# Patient Record
Sex: MALE | Race: WHITE | NOT HISPANIC OR LATINO | Employment: OTHER | URBAN - METROPOLITAN AREA
[De-identification: names, ages, dates, MRNs, and addresses within clinical notes are randomized per-mention and may not be internally consistent; named-entity substitution may affect disease eponyms.]

---

## 2017-10-06 ENCOUNTER — TRANSCRIBE ORDERS (OUTPATIENT)
Dept: ADMINISTRATIVE | Facility: HOSPITAL | Age: 72
End: 2017-10-06

## 2017-10-06 ENCOUNTER — HOSPITAL ENCOUNTER (OUTPATIENT)
Dept: RADIOLOGY | Facility: HOSPITAL | Age: 72
Discharge: HOME | End: 2017-10-06
Attending: FAMILY MEDICINE
Payer: MEDICARE

## 2017-10-06 ENCOUNTER — ALLSCRIPTS OFFICE VISIT (OUTPATIENT)
Dept: OTHER | Facility: OTHER | Age: 72
End: 2017-10-06

## 2017-10-06 DIAGNOSIS — M25.551 PAIN IN RIGHT HIP: ICD-10-CM

## 2017-10-06 DIAGNOSIS — M79.672 PAIN OF LEFT FOOT: ICD-10-CM

## 2017-10-06 DIAGNOSIS — M72.2 PLANTAR FASCIAL FIBROMATOSIS: ICD-10-CM

## 2017-10-06 DIAGNOSIS — M54.50 LOW BACK PAIN: ICD-10-CM

## 2017-10-06 PROCEDURE — 73502 X-RAY EXAM HIP UNI 2-3 VIEWS: CPT

## 2017-10-06 PROCEDURE — 73630 X-RAY EXAM OF FOOT: CPT

## 2017-10-07 NOTE — PROGRESS NOTES
Assessment  1  Left foot pain (729 5) (M79 672)   2  Plantar fasciitis, left (728 71) (M72 2)   3  Right hip pain (719 45) (M25 551)   4  Chronic low back pain (724 2,338 29) (M54 5,G89 29)    Plan  Chronic low back pain, Left foot pain, Plantar fasciitis, left, Right hip pain    · *1 - [facility]  *  Status: Active  Requested for: 17PMO2242  Care Summary provided  : Yes   · 2 - Barbi Kee  (Podiatry) Co-Management  *  Status: Hold For - Scheduling   Requested for: 38DWJ8305  Care Summary provided  : Yes  Left foot pain    · * XR FOOT 3+ VIEW LEFT; Status:Active; Requested for:06Oct2017;   Right hip pain    · * XR HIP/PELV 2-3 VWS RIGHT W PELVIS IF PERFORMED; Status:Active; Requested  for:06Oct2017;     I think that there are multiple aspects of the patient's foot pain-1st of all I think he has plantar fasciitis and I provided him with home stretches for this  Additionally, he does have swelling and significant pain on the top of the foot so I am going to get an x-ray to further evaluate what might be causing his pain  I did provide him with the information and referral to Podiatry to follow up on all of this  As far as his right hip pain, it sounds as if he likely has osteoarthritis though he has not really had any imaging  This diagnosis is all from the orthopedic surgeon who lives next door to him  As he is going for an x-ray today I advised he can also get the x-ray of his hip so this can further be evaluated  If there is severe osteoarthritis that he really should follow up with Ortho to consider treatment  As far as his chronic back pain, he seen multiple different chiropractors and feels this is not working anymore and that is not what he needs  I suggested that he try some physical therapy and provided him with a prescription for this  [facility] past and I advised that this is something that they can do in physical therapy    At some point might be reasonable to consider imaging if his symptoms persist and cannot be contributed to his hip pain  Discussion/Summary  Medication SE Review and Pt Understands Tx: Possible side effects of new medications were reviewed with the patient/guardian today  The treatment plan was reviewed with the patient/guardian  The patient/guardian understands and agrees with the treatment plan      Chief Complaint  Chief Complaint Free Text Note Form: 71YEAR OLD MALE PATIENT INITIAL VISIT TO OUR OFFICE  HE WAS A PREVIOUS PATIENT OF DR Berto Cummings [facility]  History of Present Illness  HPI: [facility] and saw Dr Ben Parks by annually with his primary at the [place] in [facility] chronic hip and back 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[employer] and has always told me needs a hip replacementhas not had any recent imaging of that hipdoes walk with a cane and has significant pain in the right hip he is very afraid of surgery  any case, today he is here because he has pain in the left footis not sure it is related at all to his chronic hip or back painwas out raking leaves last weekend and felt that afterwards but had no known traumahurts across the bottom of the footlike he had a bad bruise like he is stepping on something on the bottom of the foothis foot is swollenall of the time but also hurts a lot in the morning when he first steps down  took naproxen 500mg yesterday morning did seem to help a bitbetter yesterday afternoon and eveningworse again this morning  f/cfeeling wellcp/sob           Review of Systems  Complete-Male:   Constitutional: as noted in HPI    ENT: mild hearing loss bilat  Cardiovascular: as noted in HPI  Respiratory: as noted in HPI  Gastrointestinal: no abdominal pain  Musculoskeletal: as noted in HPI  Integumentary: no rashes     Neurological: difficulty walking-and-due to his hip, back and now foot pain  Psychiatric: no depression  Endocrine: no muscle weakness  Hematologic/Lymphatic: no tendency for easy bleeding  Active Problems  1  Flu vaccine need (V04 81) (Z23)    Past Medical History  Active Problems And Past Medical History Reviewed: The active problems and past medical history were reviewed and updated today  Surgical History  1  Denied: History Of Prior Surgery  Surgical History Reviewed: The surgical history was reviewed and updated today  Family History  Family History    1  Unknown family medical history  Family History Reviewed: The family history was reviewed and updated today  Social History   · Smoking (305 1) (F17 200)  Social History Reviewed: The social history was reviewed and updated today  Current Meds   1  [employer] Vitamin C 1000 MG Oral Tablet; Therapy: 23VZL0236 to Recorded   2  Multi Complete Oral Capsule; Therapy: 76UNS4359 to Recorded   3  Naproxen 500 MG Oral Tablet; Therapy: 41NHE8905 to Recorded   4  Zostavax 24974 UNT/0 65ML Subcutaneous Solution Reconstituted; Therapy: 54Mjd5092 to (Evaluate:83Aha1221) Recorded    Allergies  1  Sulfa Drugs  2  No Known Environmental Allergies   3  No Known Food Allergies    Vitals  Vital Signs    Recorded: 18OEB4198 08:49AM   Temperature 97 6 F   Heart Rate 67   Systolic 850   Diastolic 78   Height 5 ft 8 in   Weight 159 lb 8 oz   BMI Calculated 24 25   BSA Calculated 1 86   O2 Saturation 98     Physical Exam    Constitutional   General appearance: Abnormal   appears healthy,-uncomfortable,-within normal limits of ideal weight-and-well hydrated  Eyes   Conjunctiva and lids: No swelling, erythema, or discharge  Ears, Nose, Mouth, and Throat   External inspection of ears and nose: Normal     Pulmonary   Respiratory effort: No increased work of breathing or signs of respiratory distress      Cardiovascular   Examination of extremities for edema and/or varicosities: Abnormal  -(nonpitting edema of the top of the left foot but not into the ankle or leg)   Musculoskeletal   Gait and station: Abnormal   Gait evaluation demonstrated antalgia bilaterally-and-walks with a cane  Inspection/palpation of joints, bones, and muscles: Abnormal  -(ttp over the plantar fascia on the left foot; also exquisite ttp over the top of the foot where he has edema, just over the cuneiform bone)   Skin   Skin and subcutaneous tissue: Normal without rashes or lesions  -there is no erythema, ecchymosis, or rash over the area on the foot where he has pain or edema  Neurologic   Sensation: No sensory loss  Psychiatric   Orientation to person, place and time: Normal     Mood and affect: Normal          Results/Data  PHQ-2 Adult Depression Screening 55WTE6088 09:25AM User, Ahs     Test Name Result Flag Reference   PHQ-2 Adult Depression Score 0     Over the last two weeks, how often have you been bothered by any of the following problems?   Little interest or pleasure in doing things: Not at all - 0  Feeling down, depressed, or hopeless: Not at all - 0   PHQ-2 Adult Depression Screening Negative         Signatures   Electronically signed by : EWA Martínez ; Oct  6 2017 12:26PM EST                       (Author)

## 2017-10-10 ENCOUNTER — GENERIC CONVERSION - ENCOUNTER (OUTPATIENT)
Dept: OTHER | Facility: OTHER | Age: 72
End: 2017-10-10

## 2018-01-10 NOTE — RESULT NOTES
Discussion/Summary   The foot xray was normal   The hip x-ray showed severe arthritis and probably developmental dysplasia which would have been something he had as a child  He really does need surgery for this  He told me this has been told to him before  Please ask if he needs the number for an ortho  Verified Results  * XR HIP/PELV 2-3 VWS RIGHT W PELVIS IF PERFORMED 45VIF0481 10:54AM Gin Olive Order Number: [id number]     Test Name Result Flag Reference   * XR HIP/PELV 2-3 VWS RIGHT (Report)     RIGHT HIP     INDICATION: 78-year-old male, right hip pain     COMPARISON: None     VIEWS: AP pelvis and 2 coned down views of the hip     IMAGES: 3     FINDINGS:   Severe degenerative changes involve the right hip  There is deformity of the right femoral head suspicious for developmental dysplasia  There is no acute fracture or dislocation  Multilevel degenerative lumbar spondylosis     No lytic or blastic lesions are seen  Soft tissues are unremarkable  IMPRESSION:   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

## 2018-01-13 VITALS
TEMPERATURE: 97.6 F | DIASTOLIC BLOOD PRESSURE: 78 MMHG | HEART RATE: 67 BPM | HEIGHT: 68 IN | SYSTOLIC BLOOD PRESSURE: 144 MMHG | OXYGEN SATURATION: 98 % | WEIGHT: 159.5 LBS | BODY MASS INDEX: 24.17 KG/M2

## 2018-01-23 ENCOUNTER — ALLSCRIPTS OFFICE VISIT (OUTPATIENT)
Dept: OTHER | Facility: OTHER | Age: 73
End: 2018-01-23

## 2018-01-23 DIAGNOSIS — Z13.1 ENCOUNTER FOR SCREENING FOR DIABETES MELLITUS: ICD-10-CM

## 2018-01-23 DIAGNOSIS — R17 JAUNDICE: ICD-10-CM

## 2018-01-23 DIAGNOSIS — Z12.5 ENCOUNTER FOR SCREENING FOR MALIGNANT NEOPLASM OF PROSTATE: ICD-10-CM

## 2018-01-23 DIAGNOSIS — Z13.29 ENCOUNTER FOR SCREENING FOR OTHER SUSPECTED ENDOCRINE DISORDER: ICD-10-CM

## 2018-01-23 DIAGNOSIS — Z11.59 ENCOUNTER FOR SCREENING FOR OTHER VIRAL DISEASES (CODE): ICD-10-CM

## 2018-01-23 DIAGNOSIS — R53.83 OTHER FATIGUE: ICD-10-CM

## 2018-01-23 DIAGNOSIS — Z13.220 ENCOUNTER FOR SCREENING FOR LIPOID DISORDERS: ICD-10-CM

## 2018-01-24 ENCOUNTER — TELEPHONE (OUTPATIENT)
Dept: FAMILY MEDICINE CLINIC | Facility: CLINIC | Age: 73
End: 2018-01-24

## 2018-01-24 DIAGNOSIS — J44.1 CHRONIC OBSTRUCTIVE PULMONARY DISEASE WITH ACUTE EXACERBATION (HCC): Primary | ICD-10-CM

## 2018-01-24 RX ORDER — NAPROXEN 500 MG/1
1 TABLET ORAL 2 TIMES DAILY PRN
COMMUNITY
Start: 2017-10-06 | End: 2018-02-14 | Stop reason: HOSPADM

## 2018-01-24 RX ORDER — ALBUTEROL SULFATE 90 UG/1
2 AEROSOL, METERED RESPIRATORY (INHALATION) EVERY 4 HOURS PRN
Qty: 1 INHALER | Refills: 0 | Status: SHIPPED | OUTPATIENT
Start: 2018-01-24

## 2018-01-24 NOTE — PROGRESS NOTES
Assessment   1  Jaundice (782 4) (R17)   2  Chronic obstructive pulmonary disease (496) (J44 9)   3  Generalized abdominal pain (789 07) (R10 84)   4  GERD (gastroesophageal reflux disease) (530 81) (K21 9)   5  Recently stopped smoking   6  Poor dentition (525 9) (K08 9)   7  Degenerative joint disease (DJD) of hip (715 95) (M16 9)   8  Screening for diabetes mellitus (DM) (V77 1) (Z13 1)   9  Encounter for screening for lipid disorder (V77 91) (Z13 220)   10  Screening for thyroid disorder (V77 0) (Z13 29)   11  Need for hepatitis C screening test (V73 89) (Z11 59)   12  Fatigue (780 79) (R53 83)   13  Special screening for other neurological conditions (V80 09) (Z13 89)   14  Screening for other and unspecified genitourinary condition (V81 6) (Z13 89)    Plan    Chronic obstructive pulmonary disease    · Symbicort 80-4 5 MCG/ACT Inhalation Aerosol; INHALE 2 PUFFS Twice daily  Encounter for screening for lipid disorder    · (1) LIPID PANEL FASTING W DIRECT LDL REFLEX; Status:Active; Requested    NQX:49KES4970;   Encounter for special screening examination for neoplasm of prostate, Screening for    diabetes mellitus (DM)    · (1) PSA (SCREEN) (Dx V76 44 Screen for Prostate Cancer); Status:Active; Requested    RVD:99QRW6637; Fatigue    · (1) TESTOSTERONE; Status:Active; Requested QGQ:28OEB1916;   Jaundice    · (1) CBC/PLT/DIFF; Status:Active; Requested KIO:64RNQ5598;   Need for hepatitis C screening test, Screening for thyroid disorder    · (1) HEP C ANTIBODY; Status:Active; Requested QKZ:88RFI2241;   Screening for diabetes mellitus (DM)    · (1) COMPREHENSIVE METABOLIC PANEL; Status:Active; Requested UWO:17TOY3785;   Screening for other and unspecified genitourinary condition, Special screening for other    neurological conditions    · *VB - Urinary Incontinence Screen (Dx Z13 89 Screen for UI);  Status:Complete;   Done:    14XTD6243 02:23PM   · *VB - Urinary Incontinence Screen (Dx Z13 89 Screen for UI) ; every 1 year; Last    23Jan2018; Next 28PDS8647; Status:Active  Screening for thyroid disorder    · (1) TSH; Status:Active; Requested QOA:78QLO3389;   Special screening for other neurological conditions    · *VB - Fall Risk Assessment  (Dx Z13 89 Screen for Neurologic Disorder);    Status:Complete;   Done: 78FZB6909 02:22PM   · *VB - Fall Risk Assessment  (Dx Z13 89 Screen for Neurologic Disorder) ; every 1 year; Last 03CBG5235; Next 82NAB5808; Status:Active      * CT ABDOMEN PELVIS W CONTRAST; Status:Hold For - Scheduling; Requested NVJ:79SNM7663; Perform:[facility]; SDQ:18TTJ1141;FDBNXUR; For:Jaundice; Ordered By:Juliana Benítez;        Discussion/Summary      1) [address] 10/10/2017 FASTING BLOOD WORK , INCLUDING TESTOSTERONE AND HEPATITIS C SCREEN- PT WILL GET DONE ASAP CT SCAN ABDOMEN AND PELVIS WITH CONTRAST AFTER BLOOD WORK  OMEPRAZAOLE 20MG 1 TAB DAILY 30 MIN BEFORE EATING  WILL NEED FOLLOW UP AS SOON AS TESTING IS DONE IF SYMPTOMS WORSENING, ADVISED TO GO TO ED          Possible side effects of new medications were reviewed with the patient/guardian today  The treatment plan was reviewed with the patient/guardian  The patient/guardian understands and agrees with the treatment plan      Chief Complaint   PATIENT IS HERE FOR SOB, AND WOULD LIKE TO HAVE RENEWAL PROVENTAL, AND ASMANEX WHICH HE RECEIVES FROM [facility]  HE IS ALSO HAVING LOSS OF APPETITE, AND BOWEL ISSUES  HE IS ALSO JAUNDICE  History of Present Illness   HPI: PT SEEN IN OUR OFFICE TO RE-ESTABLISH  [facility] BUT HAS NOT BEEN IN OVER 1 YEAR  [facility] BEEN [facility] ASMANEX AND PROVENTIL INHALER- RAN OUT OF MEDICATION  NOT HAPPY WITH MUSCLE TONE  NOW JUST USES RESCUE INHALER- WOULD LIKE TO RESTART MAINTENANCE INHALER  PT QUIT SMOKING A FEW MONTHS AGO   2 BEER AND 2 SHOTS AND HAD LOWER ABDOMINAL PAIN  INCREASED BMS  20 POUNDS OVER THE PAST 2 MONTHS   HAS BEEN EATING YOGURT, TRIED OMEPRAZOLE OVER THE PAST FEW DAYS  FOR HEARTBURN  RECENTLY NOTICED THAT HE WAS JAUNDICED WHEN THEY WERE OUT SHOVELING SNOW  NOT SURE HOW LONG AGO  HAS A LAUNDRY LIST OF COMPLAINTS  Review of Systems        Constitutional: feeling tired, but-- as noted in HPI,-- no fever-- and-- no chills  ENT: no complaints of earache, no loss of hearing, no nosebleeds or nasal discharge, no sore throat or hoarseness  Cardiovascular: no complaints of slow or fast heart rate, no chest pain, no palpitations, no leg claudication or lower extremity edema  Respiratory: shortness of breath, but-- as noted in HPI,-- no cough,-- no orthopnea,-- no shortness of breath during exertion-- and-- no PND  Gastrointestinal: abdominal pain, but-- as noted in HPI,-- no nausea,-- no vomiting,-- no constipation,-- no diarrhea-- and-- no blood in stools  Genitourinary: no complaints of dysuria or incontinence, no hesitancy, no nocturia, no genital lesion, no inadequacy of penile erection  Musculoskeletal: no complaints of arthralgia, no myalgia, no joint swelling or stiffness, no limb pain or swelling  Integumentary: itching-- and-- JAUNDICE, but-- as noted in HPI  Neurological: no complaints of headache, no confusion, no numbness or tingling, no dizziness or fainting  Preventive Quality 65 and Older: The patient is currently experiencing urinary symptoms  Urinary Incontinence Symptoms includes: nocturia and weak stream, but no urinary incontinence, no incomplete bladder emptying, no urinary frequency, no urinary urgency, no dysuria, no straining, no intermittent stream and no post-void dribbling       Active Problems   1  Chronic low back pain (724 2,338 29) (M54 5,G89 29)   2  Flu vaccine need (V04 81) (Z23)   3  Left foot pain (729 5) (M79 672)   4  Plantar fasciitis, left (728 71) (M72 2)   5  Right hip pain (719 45) (M25 551)    Family History   Family History    1   Unknown family medical history  Family History Reviewed: The family history was reviewed and updated today  Social History    · Daily caffeine consumption, 2-3 servings a day   · No illicit drug use   · Occasional alcohol consumption (V49 89) (Z78 9)   · Recently stopped smoking   · Retired  The social history was reviewed and updated today  The social history was reviewed and is unchanged  Surgical History   1  Denied: History Of Prior Surgery  Surgical History Reviewed: The surgical history was reviewed and updated today  Current Meds    1  CVS Vitamin C 1000 MG Oral Tablet; Therapy: 39POM8101 to Recorded   2  Multi Complete Oral Capsule; Therapy: 04YZD9833 to Recorded   3  Naproxen 500 MG Oral Tablet; Therapy: 49IWO3398 to Recorded   4  Zostavax 45328 UNT/0 65ML Subcutaneous Solution Reconstituted; Therapy: 54Qjd1107 to (Evaluate:97Xhu5930) Recorded     The medication list was reviewed and updated today  Allergies   1  Sulfa Drugs  2  No Known Environmental Allergies   3  No Known Food Allergies    Vitals    Recorded: 77EKZ1207 10:41AM   Temperature 98 1 F   Heart Rate 79   Systolic 167   Diastolic 82   Height 5 ft 8 in   Weight 137 lb 8 oz   BMI Calculated 20 91   BSA Calculated 1 74   O2 Saturation 98     Physical Exam        Constitutional      General appearance: Abnormal  -- JAUNDICE  Eyes      Conjunctiva and lids: Abnormal  -- ICTERUS  Pulmonary      Respiratory effort: No increased work of breathing or signs of respiratory distress  Auscultation of lungs: Abnormal  -- DECREASED BREATH SOUNDS  Cardiovascular      Auscultation of heart: Normal rate and rhythm, normal S1 and S2, without murmurs  Abdomen      Abdomen: Abnormal  -- FIRM ABDOMEN WITH DIFFUSE TENDERNESS  Lymphatic      Palpation of lymph nodes in neck: No lymphadenopathy         Musculoskeletal      Gait and station: Normal        Digits and nails: Normal without clubbing or cyanosis         Inspection/palpation of joints, bones, and muscles: Normal        Psychiatric      Orientation to person, place and time: Normal        Mood and affect: Normal           Results/Data   *VB - Urinary Incontinence Screen (Dx Z13 89 Screen for UI) 70WGS2810 02:23PM Nahomy Crown      Test Name Result Flag Reference   Urinary Incontinence Assessment 08TIJ1315        *VB - Fall Risk Assessment  (Dx Z13 89 Screen for Neurologic Disorder) 60YVN8753 02:22PM Nahomy Crown      Test Name Result Flag Reference   Falls Risk      No falls in the past year      Falls Risk Assessment (Dx Z13 89 Screen for Neurologic Disorder) 16AMB1588 02:15PM UserDayan      Test Name Result Flag Reference   Falls Risk      No falls in the past year        Signatures    Electronically signed by : Shoaib Calle DO; Jan 23 2018  5:58PM EST                       (Author)

## 2018-01-26 LAB
ALBUMIN SERPL-MCNC: 3.3 G/DL (ref 3.5–4.8)
ALBUMIN/GLOB SERPL: 0.8 {RATIO} (ref 1.2–2.2)
ALP SERPL-CCNC: 570 IU/L (ref 39–117)
ALT SERPL-CCNC: 175 IU/L (ref 0–44)
AMBIG ABBREV DEFAULT: NORMAL
AST SERPL-CCNC: 108 IU/L (ref 0–40)
BASOPHILS # BLD AUTO: 0.1 X10E3/UL (ref 0–0.2)
BASOPHILS NFR BLD AUTO: 1 %
BILIRUB SERPL-MCNC: 14.3 MG/DL (ref 0–1.2)
BUN SERPL-MCNC: 22 MG/DL (ref 8–27)
BUN/CREAT SERPL: 16 (ref 10–24)
CALCIUM SERPL-MCNC: 9.5 MG/DL (ref 8.6–10.2)
CHLORIDE SERPL-SCNC: 91 MMOL/L (ref 96–106)
CHOLEST SERPL-MCNC: 257 MG/DL (ref 100–199)
CO2 SERPL-SCNC: 21 MMOL/L (ref 18–29)
CREAT SERPL-MCNC: 1.4 MG/DL (ref 0.76–1.27)
CYTOLOGY CMNT CVX/VAG CYTO-IMP: ABNORMAL
EOSINOPHIL # BLD AUTO: 0 X10E3/UL (ref 0–0.4)
EOSINOPHIL NFR BLD AUTO: 0 %
ERYTHROCYTE [DISTWIDTH] IN BLOOD BY AUTOMATED COUNT: 15.5 % (ref 12.3–15.4)
GLOBULIN SER-MCNC: 4.2 G/DL (ref 1.5–4.5)
GLUCOSE SERPL-MCNC: 131 MG/DL (ref 65–99)
HCT VFR BLD AUTO: 35 % (ref 37.5–51)
HCV AB S/CO SERPL IA: <0.1 S/CO RATIO (ref 0–0.9)
HDLC SERPL-MCNC: 11 MG/DL
HGB BLD-MCNC: 11.6 G/DL (ref 13–17.7)
IMM GRANULOCYTES # BLD: 0 X10E3/UL (ref 0–0.1)
IMM GRANULOCYTES NFR BLD: 0 %
LDLC SERPL CALC-MCNC: 192 MG/DL (ref 0–99)
LDLC/HDLC SERPL: 17.5 RATIO UNITS (ref 0–3.6)
LYMPHOCYTES # BLD AUTO: 1.5 X10E3/UL (ref 0.7–3.1)
LYMPHOCYTES NFR BLD AUTO: 8 %
MCH RBC QN AUTO: 30.4 PG (ref 26.6–33)
MCHC RBC AUTO-ENTMCNC: 33.1 G/DL (ref 31.5–35.7)
MCV RBC AUTO: 92 FL (ref 79–97)
MONOCYTES # BLD AUTO: 1.2 X10E3/UL (ref 0.1–0.9)
MONOCYTES NFR BLD AUTO: 7 %
NEUTROPHILS # BLD AUTO: 14.8 X10E3/UL (ref 1.4–7)
NEUTROPHILS NFR BLD AUTO: 84 %
PLATELET # BLD AUTO: 363 X10E3/UL (ref 150–379)
POTASSIUM SERPL-SCNC: 4 MMOL/L (ref 3.5–5.2)
PROT SERPL-MCNC: 7.5 G/DL (ref 6–8.5)
PSA SERPL-MCNC: 0.9 NG/ML (ref 0–4)
RBC # BLD AUTO: 3.82 X10E6/UL (ref 4.14–5.8)
SL AMB EGFR AFRICAN AMERICAN: 58 ML/MIN/1.73
SL AMB EGFR NON AFRICAN AMERICAN: 50 ML/MIN/1.73
SL AMB VLDL CHOLESTEROL CALC: 54 MG/DL (ref 5–40)
SODIUM SERPL-SCNC: 132 MMOL/L (ref 134–144)
TESTOST SERPL-MCNC: 148 NG/DL (ref 264–916)
TRIGL SERPL-MCNC: 272 MG/DL (ref 0–149)
TSH SERPL DL<=0.005 MIU/L-ACNC: 2.64 UIU/ML (ref 0.45–4.5)
WBC # BLD AUTO: 17.7 X10E3/UL (ref 3.4–10.8)

## 2018-01-27 ENCOUNTER — HOSPITAL ENCOUNTER (OUTPATIENT)
Dept: CT IMAGING | Facility: HOSPITAL | Age: 73
Discharge: HOME | End: 2018-01-27
Payer: COMMERCIAL

## 2018-01-27 DIAGNOSIS — R17 JAUNDICE: ICD-10-CM

## 2018-01-27 PROCEDURE — 74177 CT ABD & PELVIS W/CONTRAST: CPT

## 2018-01-27 RX ADMIN — IOHEXOL 100 ML: 350 INJECTION, SOLUTION INTRAVENOUS at 11:15

## 2018-01-30 PROBLEM — M54.50 CHRONIC LOW BACK PAIN: Status: ACTIVE | Noted: 2017-10-06

## 2018-01-30 PROBLEM — R10.84 GENERALIZED ABDOMINAL PAIN: Status: ACTIVE | Noted: 2018-01-23

## 2018-01-30 PROBLEM — M16.9 DEGENERATIVE JOINT DISEASE (DJD) OF HIP: Status: ACTIVE | Noted: 2018-01-23

## 2018-01-30 PROBLEM — K08.9 POOR DENTITION: Status: ACTIVE | Noted: 2018-01-23

## 2018-01-30 PROBLEM — J44.9 CHRONIC OBSTRUCTIVE PULMONARY DISEASE (HCC): Status: ACTIVE | Noted: 2018-01-23

## 2018-01-30 PROBLEM — M25.551 RIGHT HIP PAIN: Status: ACTIVE | Noted: 2017-10-06

## 2018-01-30 PROBLEM — R17 JAUNDICE: Status: ACTIVE | Noted: 2018-01-23

## 2018-01-30 PROBLEM — G89.29 CHRONIC LOW BACK PAIN: Status: ACTIVE | Noted: 2017-10-06

## 2018-01-30 PROBLEM — R53.83 FATIGUE: Status: ACTIVE | Noted: 2018-01-23

## 2018-01-30 PROBLEM — M79.672 LEFT FOOT PAIN: Status: ACTIVE | Noted: 2017-10-06

## 2018-01-30 PROBLEM — M72.2 PLANTAR FASCIITIS, LEFT: Status: ACTIVE | Noted: 2017-10-06

## 2018-01-30 PROBLEM — K21.9 GERD (GASTROESOPHAGEAL REFLUX DISEASE): Status: ACTIVE | Noted: 2018-01-23

## 2018-01-31 ENCOUNTER — TELEPHONE (OUTPATIENT)
Dept: GASTROENTEROLOGY | Facility: CLINIC | Age: 73
End: 2018-01-31

## 2018-01-31 ENCOUNTER — HOSPITAL ENCOUNTER (INPATIENT)
Facility: HOSPITAL | Age: 73
LOS: 14 days | DRG: 435 | End: 2018-02-14
Attending: EMERGENCY MEDICINE | Admitting: ANESTHESIOLOGY
Payer: COMMERCIAL

## 2018-01-31 DIAGNOSIS — N13.30 HYDRONEPHROSIS, UNSPECIFIED HYDRONEPHROSIS TYPE: ICD-10-CM

## 2018-01-31 DIAGNOSIS — C79.9 METASTATIC ADENOCARCINOMA (HCC): ICD-10-CM

## 2018-01-31 DIAGNOSIS — R79.89 ABNORMAL LFTS: ICD-10-CM

## 2018-01-31 DIAGNOSIS — R63.4 WEIGHT LOSS, NON-INTENTIONAL: ICD-10-CM

## 2018-01-31 DIAGNOSIS — R16.0 LIVER MASS: ICD-10-CM

## 2018-01-31 DIAGNOSIS — E87.2 LACTIC ACIDOSIS: ICD-10-CM

## 2018-01-31 DIAGNOSIS — R10.84 GENERALIZED ABDOMINAL PAIN: ICD-10-CM

## 2018-01-31 DIAGNOSIS — R93.5 ABNORMAL CT OF THE ABDOMEN: ICD-10-CM

## 2018-01-31 DIAGNOSIS — R17 JAUNDICE: Primary | ICD-10-CM

## 2018-01-31 PROBLEM — J44.9 CHRONIC OBSTRUCTIVE PULMONARY DISEASE (HCC): Chronic | Status: ACTIVE | Noted: 2018-01-23

## 2018-01-31 LAB
ALBUMIN SERPL BCP-MCNC: 2.1 G/DL (ref 3.5–5)
ALP SERPL-CCNC: 467 U/L (ref 46–116)
ALT SERPL W P-5'-P-CCNC: 110 U/L (ref 12–78)
ANION GAP SERPL CALCULATED.3IONS-SCNC: 9 MMOL/L (ref 4–13)
AST SERPL W P-5'-P-CCNC: 80 U/L (ref 5–45)
BACTERIA UR QL AUTO: ABNORMAL /HPF
BILIRUB DIRECT SERPL-MCNC: 12.57 MG/DL (ref 0–0.2)
BILIRUB SERPL-MCNC: 14.95 MG/DL (ref 0.2–1)
BILIRUB UR QL STRIP: ABNORMAL
BUN SERPL-MCNC: 20 MG/DL (ref 5–25)
CALCIUM SERPL-MCNC: 8.9 MG/DL (ref 8.3–10.1)
CHLORIDE SERPL-SCNC: 100 MMOL/L (ref 100–108)
CLARITY UR: ABNORMAL
CO2 SERPL-SCNC: 25 MMOL/L (ref 21–32)
COLOR UR: ABNORMAL
CREAT SERPL-MCNC: 1.34 MG/DL (ref 0.6–1.3)
GFR SERPL CREATININE-BSD FRML MDRD: 53 ML/MIN/1.73SQ M
GLUCOSE SERPL-MCNC: 110 MG/DL (ref 65–140)
GLUCOSE UR STRIP-MCNC: NEGATIVE MG/DL
HGB UR QL STRIP.AUTO: NEGATIVE
HOLD SPECIMEN: NORMAL
HYALINE CASTS #/AREA URNS LPF: ABNORMAL /LPF
KETONES UR STRIP-MCNC: ABNORMAL MG/DL
LEUKOCYTE ESTERASE UR QL STRIP: ABNORMAL
NITRITE UR QL STRIP: POSITIVE
NON-SQ EPI CELLS URNS QL MICRO: ABNORMAL /HPF
PH UR STRIP.AUTO: 5.5 [PH] (ref 4.5–8)
POTASSIUM SERPL-SCNC: 3.7 MMOL/L (ref 3.5–5.3)
PROT SERPL-MCNC: 7.5 G/DL (ref 6.4–8.2)
PROT UR STRIP-MCNC: ABNORMAL MG/DL
RBC #/AREA URNS AUTO: ABNORMAL /HPF
SODIUM SERPL-SCNC: 134 MMOL/L (ref 136–145)
SP GR UR STRIP.AUTO: 1.03 (ref 1–1.03)
UROBILINOGEN UR QL STRIP.AUTO: 1 E.U./DL
WBC #/AREA URNS AUTO: ABNORMAL /HPF

## 2018-01-31 PROCEDURE — 81001 URINALYSIS AUTO W/SCOPE: CPT | Performed by: EMERGENCY MEDICINE

## 2018-01-31 PROCEDURE — 85025 COMPLETE CBC W/AUTO DIFF WBC: CPT | Performed by: EMERGENCY MEDICINE

## 2018-01-31 PROCEDURE — 82248 BILIRUBIN DIRECT: CPT | Performed by: EMERGENCY MEDICINE

## 2018-01-31 PROCEDURE — 82378 CARCINOEMBRYONIC ANTIGEN: CPT | Performed by: HOSPITALIST

## 2018-01-31 PROCEDURE — 94760 N-INVAS EAR/PLS OXIMETRY 1: CPT

## 2018-01-31 PROCEDURE — 80053 COMPREHEN METABOLIC PANEL: CPT | Performed by: EMERGENCY MEDICINE

## 2018-01-31 PROCEDURE — 36415 COLL VENOUS BLD VENIPUNCTURE: CPT

## 2018-01-31 PROCEDURE — 99285 EMERGENCY DEPT VISIT HI MDM: CPT

## 2018-01-31 PROCEDURE — 86301 IMMUNOASSAY TUMOR CA 19-9: CPT | Performed by: HOSPITALIST

## 2018-01-31 PROCEDURE — 99223 1ST HOSP IP/OBS HIGH 75: CPT | Performed by: HOSPITALIST

## 2018-01-31 RX ORDER — OXYCODONE HCL 5 MG/5 ML
5 SOLUTION, ORAL ORAL EVERY 4 HOURS PRN
Status: DISCONTINUED | OUTPATIENT
Start: 2018-01-31 | End: 2018-02-10

## 2018-01-31 RX ORDER — POLYETHYLENE GLYCOL 3350 17 G/17G
17 POWDER, FOR SOLUTION ORAL DAILY
Status: DISCONTINUED | OUTPATIENT
Start: 2018-02-01 | End: 2018-02-12

## 2018-01-31 RX ORDER — SENNOSIDES 8.6 MG
1 TABLET ORAL DAILY
Status: DISCONTINUED | OUTPATIENT
Start: 2018-02-01 | End: 2018-02-12

## 2018-01-31 RX ORDER — ALBUTEROL SULFATE 90 UG/1
2 AEROSOL, METERED RESPIRATORY (INHALATION) EVERY 4 HOURS PRN
Status: DISCONTINUED | OUTPATIENT
Start: 2018-01-31 | End: 2018-02-12

## 2018-01-31 RX ORDER — DOCUSATE SODIUM 100 MG/1
100 CAPSULE, LIQUID FILLED ORAL 2 TIMES DAILY
Status: DISCONTINUED | OUTPATIENT
Start: 2018-02-01 | End: 2018-02-12

## 2018-01-31 RX ORDER — ONDANSETRON 2 MG/ML
4 INJECTION INTRAMUSCULAR; INTRAVENOUS EVERY 6 HOURS PRN
Status: DISCONTINUED | OUTPATIENT
Start: 2018-01-31 | End: 2018-02-12

## 2018-01-31 RX ORDER — ACETAMINOPHEN 325 MG/1
650 TABLET ORAL EVERY 6 HOURS PRN
Status: DISCONTINUED | OUTPATIENT
Start: 2018-01-31 | End: 2018-02-10

## 2018-01-31 RX ORDER — MORPHINE SULFATE 4 MG/ML
4 INJECTION, SOLUTION INTRAMUSCULAR; INTRAVENOUS ONCE
Status: DISCONTINUED | OUTPATIENT
Start: 2018-01-31 | End: 2018-02-11

## 2018-01-31 RX ORDER — SODIUM CHLORIDE 9 MG/ML
90 INJECTION, SOLUTION INTRAVENOUS CONTINUOUS
Status: DISPENSED | OUTPATIENT
Start: 2018-01-31 | End: 2018-02-01

## 2018-01-31 RX ADMIN — SODIUM CHLORIDE 90 ML/HR: 0.9 INJECTION, SOLUTION INTRAVENOUS at 23:48

## 2018-02-01 PROBLEM — N39.0 UTI (URINARY TRACT INFECTION): Status: ACTIVE | Noted: 2018-02-01

## 2018-02-01 LAB
ALBUMIN SERPL BCP-MCNC: 1.9 G/DL (ref 3.5–5)
ALP SERPL-CCNC: 434 U/L (ref 46–116)
ALT SERPL W P-5'-P-CCNC: 100 U/L (ref 12–78)
ANION GAP SERPL CALCULATED.3IONS-SCNC: 9 MMOL/L (ref 4–13)
AST SERPL W P-5'-P-CCNC: 74 U/L (ref 5–45)
BASOPHILS # BLD AUTO: 0.09 THOUSANDS/ΜL (ref 0–0.1)
BASOPHILS NFR BLD AUTO: 1 % (ref 0–1)
BILIRUB SERPL-MCNC: 13.95 MG/DL (ref 0.2–1)
BUN SERPL-MCNC: 23 MG/DL (ref 5–25)
CALCIUM SERPL-MCNC: 8.6 MG/DL (ref 8.3–10.1)
CEA SERPL-MCNC: 4.1 NG/ML (ref 0–3)
CHLORIDE SERPL-SCNC: 103 MMOL/L (ref 100–108)
CO2 SERPL-SCNC: 24 MMOL/L (ref 21–32)
CREAT SERPL-MCNC: 1.32 MG/DL (ref 0.6–1.3)
EOSINOPHIL # BLD AUTO: 0.04 THOUSAND/ΜL (ref 0–0.61)
EOSINOPHIL NFR BLD AUTO: 0 % (ref 0–6)
ERYTHROCYTE [DISTWIDTH] IN BLOOD BY AUTOMATED COUNT: 18.5 % (ref 11.6–15.1)
ERYTHROCYTE [DISTWIDTH] IN BLOOD BY AUTOMATED COUNT: 18.6 % (ref 11.6–15.1)
GFR SERPL CREATININE-BSD FRML MDRD: 54 ML/MIN/1.73SQ M
GLUCOSE SERPL-MCNC: 104 MG/DL (ref 65–140)
HCT VFR BLD AUTO: 29 % (ref 36.5–49.3)
HCT VFR BLD AUTO: 29.1 % (ref 36.5–49.3)
HGB BLD-MCNC: 10.2 G/DL (ref 12–17)
HGB BLD-MCNC: 10.5 G/DL (ref 12–17)
LYMPHOCYTES # BLD AUTO: 1.92 THOUSANDS/ΜL (ref 0.6–4.47)
LYMPHOCYTES NFR BLD AUTO: 11 % (ref 14–44)
MCH RBC QN AUTO: 30 PG (ref 26.8–34.3)
MCH RBC QN AUTO: 30.7 PG (ref 26.8–34.3)
MCHC RBC AUTO-ENTMCNC: 35.1 G/DL (ref 31.4–37.4)
MCHC RBC AUTO-ENTMCNC: 36.2 G/DL (ref 31.4–37.4)
MCV RBC AUTO: 85 FL (ref 82–98)
MCV RBC AUTO: 86 FL (ref 82–98)
MONOCYTES # BLD AUTO: 1.44 THOUSAND/ΜL (ref 0.17–1.22)
MONOCYTES NFR BLD AUTO: 8 % (ref 4–12)
NEUTROPHILS # BLD AUTO: 13.47 THOUSANDS/ΜL (ref 1.85–7.62)
NEUTS SEG NFR BLD AUTO: 80 % (ref 43–75)
NRBC BLD AUTO-RTO: 0 /100 WBCS
PLATELET # BLD AUTO: 287 THOUSANDS/UL (ref 149–390)
PLATELET # BLD AUTO: 318 THOUSANDS/UL (ref 149–390)
PMV BLD AUTO: 10.5 FL (ref 8.9–12.7)
PMV BLD AUTO: 11 FL (ref 8.9–12.7)
POTASSIUM SERPL-SCNC: 3.8 MMOL/L (ref 3.5–5.3)
PROT SERPL-MCNC: 6.9 G/DL (ref 6.4–8.2)
PSA SERPL-MCNC: 0.6 NG/ML (ref 0–4)
RBC # BLD AUTO: 3.4 MILLION/UL (ref 3.88–5.62)
RBC # BLD AUTO: 3.42 MILLION/UL (ref 3.88–5.62)
SODIUM SERPL-SCNC: 136 MMOL/L (ref 136–145)
WBC # BLD AUTO: 13.63 THOUSAND/UL (ref 4.31–10.16)
WBC # BLD AUTO: 17.11 THOUSAND/UL (ref 4.31–10.16)

## 2018-02-01 PROCEDURE — 99223 1ST HOSP IP/OBS HIGH 75: CPT | Performed by: INTERNAL MEDICINE

## 2018-02-01 PROCEDURE — 99222 1ST HOSP IP/OBS MODERATE 55: CPT | Performed by: NURSE PRACTITIONER

## 2018-02-01 PROCEDURE — 99222 1ST HOSP IP/OBS MODERATE 55: CPT | Performed by: INTERNAL MEDICINE

## 2018-02-01 PROCEDURE — 80053 COMPREHEN METABOLIC PANEL: CPT | Performed by: HOSPITALIST

## 2018-02-01 PROCEDURE — 85027 COMPLETE CBC AUTOMATED: CPT | Performed by: HOSPITALIST

## 2018-02-01 PROCEDURE — G0103 PSA SCREENING: HCPCS | Performed by: INTERNAL MEDICINE

## 2018-02-01 PROCEDURE — 99233 SBSQ HOSP IP/OBS HIGH 50: CPT | Performed by: HOSPITALIST

## 2018-02-01 RX ADMIN — ACETAMINOPHEN 650 MG: 325 TABLET, FILM COATED ORAL at 07:34

## 2018-02-01 RX ADMIN — ENOXAPARIN SODIUM 40 MG: 40 INJECTION SUBCUTANEOUS at 08:46

## 2018-02-01 RX ADMIN — DOCUSATE SODIUM 100 MG: 100 CAPSULE, LIQUID FILLED ORAL at 18:12

## 2018-02-01 RX ADMIN — DOCUSATE SODIUM 100 MG: 100 CAPSULE, LIQUID FILLED ORAL at 08:46

## 2018-02-01 NOTE — H&P
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 involving upper abdominal organs, omentum and peritoneal margin, long segment of sigmoid colon in left hemipelvis  High-grade intra hepatic biliary dilatation  Oncology Dr Ravinder Hernández recommended patient to come to the emergency room to expedite in-hospital workup  Blood work reveal TB 14 9 direct bilirubin 12 6  CEA, Ca199 ordered  IV fluids antiemetic pain management  Oncology and GI consult  requested                 Jaundice   Assessment & Plan    Secondary to #1  Abnormal LFTs   Assessment & Plan    Secondary to #1  Chronic obstructive pulmonary disease (HCC)   Assessment & Plan    Continue albuterol   Respiratory protocol        Weight loss, non-intentional   Assessment & Plan    Secondary to #1( decreased oral intake with evidence of malignancy unknown primary origin)  Nutritionist consult placed        Fatigue   Assessment & Plan    Secondary to # 1  VTE Prophylaxis: Enoxaparin (Lovenox)  / sequential compression device   Code Status: full  POLST: There is no POLST form on file for this patient (pre-hospital)      Anticipated Length of Stay:  Patient will be admitted on an Inpatient basis with an anticipated length of stay of  > 2 midnights  Justification for Hospital Stay:  Oncology and GI consult    Total Time for Visit, including Counseling / Coordination of Care: 45 minutes  Greater than 50% of this total time spent on direct patient counseling and coordination of care      Chief Complaint:   Painless jaundice    History of Present Illness:    Elisa Perkins is a 71 y o  male with long history of smoking who presented to the emergency room for evaluation of abnormal CT scan finding   Patient reports approximately 30 lb unintentional weight loss over the last couple of months , change in  bowel habits, fatigue, lower abdominal pain nonradiating  Denies fever chills nausea vomiting chest pain, melena hematemesis hematuria  Recently his son noted yellow skin tone  Patient went to PCP approximately a week ago and had an  outpatient CT scan which reported 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 involving upper abdominal organs omentum and peritoneal margin,  long segment of sigmoid colon in left hemipelvis intra hepatic biliary dilatation to the level of the otto hepatis left-sided hydronephrosis  Total bilirubin 14 9 direct 12 6  AST ALT and alk phos elevated  Patient reports last colonoscopy more than 10 years ago was normal  Patient admitted on an  inpatient basis for further workup    Review of Systems:    Review of Systems   Constitutional: Positive for activity change, appetite change, fatigue and unexpected weight change  Skin: Positive for color change  Past Medical and Surgical History:     Past Medical History:   Diagnosis Date    COPD (chronic obstructive pulmonary disease) ([address] )     DJD (degenerative joint disease)        History reviewed  No pertinent surgical history  Meds/Allergies:    Prior to Admission medications    Medication Sig Start Date End Date Taking?  Authorizing Provider   albuterol (VENTOLIN HFA) 90 mcg/act inhaler Inhale 2 puffs every 4 (four) hours as needed for wheezing 1/24/18  Yes Errol Smart, DO   Multiple Vitamins-Minerals (MULTI COMPLETE PO) Take 1 tablet by mouth daily 10/6/17  Yes Historical Provider, MD   naproxen (NAPROSYN) 500 mg tablet Take 1 tablet by mouth 2 (two) times a day as needed 10/6/17  Yes Historical Provider, MD     I have reviewed home medications with a medical source (PCP, Pharmacy, other)  Allergies: Allergies   Allergen Reactions    Sulfa Antibiotics        Social History:     Marital Status: Single   Occupation: none  Patient Pre-hospital Living Situation: home  Patient Pre-hospital Level of Mobility: reg  Patient Pre-hospital Diet Restrictions: reg  Substance Use History:   History   Alcohol Use No     History   Smoking Status    Former Smoker   Smokeless Tobacco    Never Used     History   Drug Use No       Family History:    non-contributory    Physical Exam:     Vitals:   Blood Pressure: 158/85 (01/31/18 2234)  Pulse: 75 (01/31/18 2234)  Temperature: 98 2 °F (36 8 °C) (01/31/18 2234)  Temp Source: Axillary (01/31/18 2234)  Respirations: 18 (01/31/18 2234)  Height: 5' 8" (172 7 cm) (01/31/18 2234)  Weight - Scale: 59 1 kg (130 lb 6 4 oz) (01/31/18 2234)  SpO2: 100 % (01/31/18 2234)    Physical Exam   Constitutional: No distress  HENT:   Head: Normocephalic  Eyes: Pupils are equal, round, and reactive to light  Scleral icterus is present  Neck: Normal range of motion  Cardiovascular: Regular rhythm  Pulmonary/Chest: No respiratory distress  Abdominal: There is tenderness  Musculoskeletal: He exhibits no edema  Neurological: He is alert  Skin:   Jaundice           Additional Data:     Lab Results: I have personally reviewed pertinent reports  Results from last 7 days  Lab Units 01/31/18 2020   SODIUM mmol/L 134*   POTASSIUM mmol/L 3 7   CHLORIDE mmol/L 100   CO2 mmol/L 25   BUN mg/dL 20   CREATININE mg/dL 1 34*   CALCIUM mg/dL 8 9   TOTAL PROTEIN g/dL 7 5   BILIRUBIN TOTAL mg/dL 14 95*   ALK PHOS U/L 467*   ALT U/L 110*   AST U/L 80*   GLUCOSE RANDOM mg/dL 110           Imaging: I have personally reviewed pertinent reports  No orders to display         [facility] Records Reviewed: Yes     ** Please Note: This note has been constructed using a voice recognition system   **

## 2018-02-01 NOTE — ASSESSMENT & PLAN NOTE
Secondary to above( decreased oral intake with evidence of malignancy unknown primary origin)  Nutritionist consult placed

## 2018-02-01 NOTE — ASSESSMENT & PLAN NOTE
Patient presented with painless jaundice significant weight loss outpatient CT of abdomen reported 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 involving upper abdominal organs, omentum and peritoneal margin, long segment of sigmoid colon in left hemipelvis  High-grade intra hepatic biliary dilatation    Oncology Dr Eric Bergeron recommended patient to come to the emergency room to expedite in-hospital workup  Blood work reveal TB 14 9 direct bilirubin 12 6  CEA, Ca199 ordered  IV fluids antiemetic pain management  Oncology and GI consult  requested

## 2018-02-01 NOTE — CASE MANAGEMENT
Initial Clinical Review    Admission: Date/Time/Statement: 1/31/18 @ 2140     Orders Placed This Encounter   Procedures    Inpatient Admission (expected length of stay for this patient is greater than two midnights)     Standing Status:   Standing     Number of Occurrences:   1     Order Specific Question:   Admitting Physician     Answer:   Anthony Murphy     Order Specific Question:   Level of Care     Answer:   Med Surg [16]     Order Specific Question:   Estimated length of stay     Answer:   More than 2 Midnights     Order Specific Question:   Certification     Answer:   I certify that inpatient services are medically necessary for this patient for a duration of greater than two midnights  See H&P and MD Progress Notes for additional information about the patient's course of treatment  ED: Date/Time/Mode of Arrival:   ED Arrival Information     Expected Arrival Acuity Means of Arrival Escorted By Service Admission Type    - 1/31/2018 18:20 Urgent Walk-In Self General Medicine Urgent    Arrival Complaint    Jaundice, High Bilirubin Ct          Chief Complaint:   Chief Complaint   Patient presents with    Jaundice     pt with jaundice starting a few days ago have never had this problem before, pt with weight loss, had CT scan Saturday has high bilirubin        History of Illness: 71 y o  male with long history of smoking who presented to the emergency room for evaluation of abnormal CT scan finding  Patient reports approximately 30 lb unintentional weight loss over the last couple of months, change in  bowel habits, fatigue, lower abdominal pain nonradiating  Denies fever chills nausea vomiting chest pain, melena hematemesis hematuria  Recently his son noted yellow skin tone    Patient went to PCP approximately a week ago and had an  outpatient CT scan which reported 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 involving upper abdominal organs omentum and peritoneal margin,  long segment of sigmoid colon in left hemipelvis intra hepatic biliary dilatation to the level of the otto hepatis left-sided hydronephrosis  Total bilirubin 14 9 direct 12 6  AST ALT and alk phos elevated  Patient reports last colonoscopy more than 10 years ago was normal  Patient admitted on an  inpatient basis for further workup    ED Vital Signs:   ED Triage Vitals [01/31/18 1827]   Temperature Pulse Respirations Blood Pressure SpO2   97 9 °F (36 6 °C) 84 18 137/83 99 %      Temp Source Heart Rate Source Patient Position - Orthostatic VS BP Location FiO2 (%)   Tympanic Monitor Sitting Left arm --      Pain Score       No Pain        Wt Readings from Last 1 Encounters:   02/01/18 56 8 kg (125 lb 3 5 oz)       Vital Signs (abnormal): WNL    Abnormal Labs:    01/31/18 2020    Sodium 136 - 145 mmol/L 134     Potassium 3 5 - 5 3 mmol/L 3 7    Chloride 100 - 108 mmol/L 100    CO2 21 - 32 mmol/L 25    Anion Gap 4 - 13 mmol/L 9    BUN 5 - 25 mg/dL 20    Creatinine 0 60 - 1 30 mg/dL 1 34     Glucose 65 - 140 mg/dL 110    Calcium 8 3 - 10 1 mg/dL 8 9    AST 5 - 45 U/L 80     ALT 12 - 78 U/L 110     Alkaline Phosphatase 46 - 116 U/L 467     Total Protein 6 4 - 8 2 g/dL 7 5    Albumin 3 5 - 5 0 g/dL 2 1     Total Bilirubin 0 20 - 1 00 mg/dL 14 95        01/31/18 2020    WBC 4 31 - 10 16 Thousand/uL 17 11     RBC 3 88 - 5 62 Million/uL 3 42     Hemoglobin 12 0 - 17 0 g/dL 10 5     Hematocrit 36 5 - 49 3 % 29 0        01/31/18 2020    CEA 0 0 - 3 0 ng/mL 4 1         Diagnostic Test Results: CT Abd/ Pelvis - Extensive infiltrating tumor mass of unknown primary involving upper abdominal organs, omentum and peritoneal margin, left para-aortic nodes, as well as a long segment of sigmoid colon in left hemipelvis  As a result of infiltrating tumor mass there is high-grade intrahepatic biliary dilatation to the level of the otto hepatis as well as left-sided hydroureteronephrosis level of the left hemipelvis      ED Treatment: Medication Administration from 01/31/2018 1819 to 01/31/2018 3025       Date/Time Order Dose Route Action Action by Comments   None       Past Medical/Surgical History: Active Ambulatory Problems     Diagnosis Date Noted    Chronic low back pain 10/06/2017    Chronic obstructive pulmonary disease (HCC) 01/23/2018    Degenerative joint disease (DJD) of hip 01/23/2018    Fatigue 01/23/2018    Generalized abdominal pain 01/23/2018    GERD (gastroesophageal reflux disease) 01/23/2018    Jaundice 01/23/2018    Left foot pain 10/06/2017    Plantar fasciitis, left 10/06/2017    Poor dentition 01/23/2018    Right hip pain 10/06/2017     Resolved Ambulatory Problems     Diagnosis Date Noted    No Resolved Ambulatory Problems     Past Medical History:   Diagnosis Date    COPD (chronic obstructive pulmonary disease) ([facility] )     DJD (degenerative joint disease)        Admitting Diagnosis: Jaundice [R17]  Abnormal CT of the abdomen [R93 5]    Age/Sex: 71 y o  male    Assessment/Plan:   Abnormal CT of the abdomen   Assessment & Plan     Patient presented with painless jaundice significant weight loss outpatient CT of abdomen reported 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 involving upper abdominal organs, omentum and peritoneal margin, long segment of sigmoid colon in left hemipelvis  High-grade intra hepatic biliary dilatation    Oncology Dr Elena Tyler recommended patient to come to the emergency room to expedite in-hospital workup  Blood work reveal TB 14 9 direct bilirubin 12 6  CEA, Ca199 ordered  IV fluids antiemetic pain management  Oncology and GI consult  requested              Jaundice   Assessment & Plan     Secondary to #1           Abnormal LFTs   Assessment & Plan     Secondary to #1           Chronic [facility] pulmonary disease ([facility] )   Assessment & Plan     Continue albuterol   Respiratory protocol          Weight loss, non-intentional   Assessment & Plan     Secondary to #1( decreased oral intake with evidence of malignancy unknown primary origin)  Nutritionist consult placed          Fatigue   Assessment & Plan     Secondary to # 1              VTE Prophylaxis: Enoxaparin (Lovenox)  / sequential compression device   Code Status: full  POLST: There is no POLST form on file for this patient (pre-hospital)     Anticipated Length of Stay:  Patient will be admitted on an Inpatient basis with an anticipated length of stay of  > 2 midnights  Justification for Hospital Stay:  Oncology and GI consult       Admission Orders:  Clear liquid  Gastroenterology cons  Oncology cons  Urology cons    Scheduled Meds:   Current Facility-Administered Medications:  acetaminophen 650 mg Oral Q6H PRN   albuterol 2 puff Inhalation Q4H PRN   docusate sodium 100 mg Oral BID   enoxaparin 40 mg Subcutaneous Daily   HYDROmorphone 0 5 mg Intravenous Q3H PRN   morphine injection 4 mg Intravenous Once   ondansetron 4 mg Intravenous Q6H PRN   oxyCODONE 5 mg Oral Q4H PRN   polyethylene glycol 17 g Oral Daily   senna 1 tablet Oral Daily   sodium chloride 90 mL/hr Intravenous Continuous     Continuous Infusions:   sodium chloride 90 mL/hr Last Rate: 90 mL/hr (01/31/18 2456)     PRN Meds:     Acetaminophen po x1    albuterol    HYDROmorphone    ondansetron    oxyCODONE    --------------------------------------------------------------------------------------------------    2/1 Oncology cons:  Assessment and plan:  1    Extensive masses in the upper abdomen area with extension into the otto hepaticus area and dilatation of the intra hepatic bile duct, with painless obstructive jaundice in a patient who had been losing weight about 30 lb in the past 2-3 months, CT scan showed extensive infiltrating tumor involving the upper abdominal organs, omentum, possible peritoneal carcinomatosis, left para-aortic nodes, left hydronephrosis, possible sigmoid colon involvement, CEA 4 2  2   GI consult on board for possible ERCP or PTCA to alleviate obstructive jaundice  3  Urology consult for left hydronephrosis, he has enlarged prostate, PSA is ordered  4   CA 19-9 is pending  5    Await for pathology he might need an IR core biopsy for definitive diagnosis and will proceed from there

## 2018-02-01 NOTE — CONSULTS
CONSULT    Patient Name: Linette Vines  Patient MRN: [id number]  Date of Service: 2/1/2018   Date / Time Note Created: 2/1/2018 3:05 PM   Referring Provider: Dr Singh Creating Note: ABI Coker    PCP: Amirah Mcgarry  Attending Provider:  Irvin Meneses MD    Reason for Consult: Hydronephrosis      HPI--Mr Lula Nuñez is a 23-year-old male admitted with jaundice and elevated LFTs  Patient denies any prior  history including nephrolithiasis, frequent UTIs or prior  surgical manipulation  CT scan was obtained to investigate patient's  complaints as well as recent weight loss positive for extensive infiltrating tumor of  unknown primary site with involvement of abdominal organs including pancreas, omentum, peritoneal margin left periaortic nodes; in addition to segment of sigmoid colon  Patient denies any fever, chills, flank, testicular or groin pain or any irritative or obstructive urinary symptoms including dysuria, hematuria, or sensation of incomplete bladder emptying  Urologic consultation is requested concerning additional findings of left hydronephrosis  Creatinine remains intact and patient is voiding spontaneously into bedside urinal   Source:the patient       Active Problems:    Patient Active Problem List   Diagnosis    Chronic low back pain    Chronic obstructive pulmonary disease (HCC)    Degenerative joint disease (DJD) of hip    Fatigue    Generalized abdominal pain    GERD (gastroesophageal reflux disease)    Jaundice    Left foot pain    Plantar fasciitis, left    Poor dentition    Right hip pain    Abnormal CT of the abdomen    Abnormal LFTs    Weight loss, non-intentional    UTI (urinary tract infection)       Impressions  Hydronephrosis 2/2 extrinsic compression of large abdominal tumor with multiple organ involvement    Recommendations  Patient denies any flank pain and creatinine remains intact    Would monitor creatinine trend  If patient should developed acute kidney injury, would reimage for worsening or progressive hydronephrosis  If patient is to consider cyto-reductive chemotherapy or other treatment in the near future and creatinine is not optimized, ureteral stent placement or other renal decompression may be necessary to support patient's treatment goals  Otherwise, reduction tumor burden  and its associated mass effect will likely result in resolution of hydronephrosis        Past Medical History:   Diagnosis Date    COPD (chronic obstructive pulmonary disease) (HCC)     DJD (degenerative joint disease)        History reviewed  No pertinent surgical history  History reviewed  No pertinent family history  Social History     Social History    Marital status: Single     Spouse name: N/A    Number of children: N/A    Years of education: N/A     Occupational History    Not on file       Social History Main Topics    Smoking status: Former Smoker    Smokeless tobacco: Never Used    Alcohol use No    Drug use: No    Sexual activity: Not on file     Other Topics Concern    Not on file     Social History Narrative    No narrative on file       Allergies   Allergen Reactions    Sulfa Antibiotics Rash       Review of Systems  10 point review of systems negative except as noted in HPI  Constitutional:   negative for - fatigue or weight loss  Hematological and Lymphatic:   negative  Respiratory:   no cough, shortness of breath, or wheezing  Cardiovascular:   no chest pain or dyspnea on exertion  Gastrointestinal:   no abdominal pain, change in bowel habits, or black or bloody stools  Genito-Urinary:   no dysuria, trouble voiding, or hematuria  Neurological:   no TIA or stroke symptoms     Chart Review   Allergies, current medications, history, problem list    Vital Signs  /77 (BP Location: Left arm)   Pulse 67   Temp 98 5 °F (36 9 °C) (Oral)   Resp 18   Ht 5' 8" (1 727 m)   Wt 56 8 kg (125 lb 3 5 oz)   SpO2 97%   BMI 19 04 kg/m² Physical Exam  General appearance: alert and oriented, in no acute distress and cooperative  Head: Normocephalic, without obvious abnormality, atraumatic  Neck: no adenopathy, no carotid bruit, no JVD, supple, symmetrical, trachea midline and thyroid not enlarged, symmetric, no tenderness/mass/nodules  Lungs: clear to auscultation bilaterally  Heart: regular rate and rhythm, S1, S2 normal, no murmur, click, rub or gallop  Abdomen: soft, non-tender; bowel sounds normal; no masses,  no organomegaly  Extremities: extremities normal, warm and well-perfused; no cyanosis, clubbing, or edema  Pulses: 2+ and symmetric  Neurologic: Grossly normal  No urinary drains     Laboratory Studies  Lab Results   Component Value Date     02/01/2018    K 3 8 02/01/2018     02/01/2018    CO2 24 02/01/2018    GLUCOSE 104 02/01/2018    CREATININE 1 32 (H) 02/01/2018    BUN 23 02/01/2018     Lab Results   Component Value Date    WBC 13 63 (H) 02/01/2018    RBC 3 40 (L) 02/01/2018    HGB 10 2 (L) 02/01/2018    HCT 29 1 (L) 02/01/2018    MCV 86 02/01/2018    MCH 30 0 02/01/2018    RDW 18 6 (H) 02/01/2018     02/01/2018       Imaging and Other Studies  )  Ct Abdomen Pelvis W Contrast    Result Date: 1/30/2018  Narrative: CT ABDOMEN AND PELVIS WITH IV CONTRAST INDICATION:  Jaundice  Constipation  COMPARISON: None  TECHNIQUE:  CT examination of the abdomen and pelvis was performed  Scanning through the abdomen was performed in arterial, venous and delayed phases according a protocol spefically designed to evaluate upper abdominal viscera  Reformatted images were created in axial, sagittal, and coronal planes  Radiation dose length product (DLP) for this visit:  724 38 mGy-cm   This examination, like all CT scans performed in the [facility], was performed utilizing techniques to minimize radiation dose exposure, including the use of iterative  reconstruction and automated exposure control   IV Contrast: 100 mL of iohexol (OMNIPAQUE)  Because of borderline renal function, standard department hydration protocol was recommended to minimize risk of contrast induced nephropathy  Enteric Contrast:  Enteric contrast was administered  FINDINGS: There is sigmoid wall thickening with pericolonic inflammatory edema  The appearance is suspicious for sigmoid tumor mass especially given extensive tumor mass in the upper abdomen described in greater detail below  There is left-sided hydroureteronephrosis to the site of left sigmoid infiltrating tumor  There is infiltrating soft tissue tumor mass with spiculated margins and desmoplastic response involving multiple organs in the upper abdomen  Given involvement of multiple upper abdominal organs, the primary origin of the tumor mass cannot be elucidated with certainty on the basis of this examination  Spiculated tumor mass involves the pancreatic tail (tumor in the pancreatic tail measures 3 6 x 2 3 cm on image 49 of series 3), the greater curvature of the stomach (a portion of the spiculated tumor mass inseparable from the cervical wall thickening curvature of the stomach measures 4 1 x 4 1 cm on image 44 series 3), extends along the greater curvature of the stomach with encasement of the otto hepatis, fills the otto hepatis where there is occlusion of the main portal vein (well demonstrated on coronal image 63 of series 603) with infiltrative tumor mass extending along the undersurface of the right hepatic lobe (tumor mass along the undersurface of right hepatic lobe measures 6 1 x 3 3 cm on image 55 of series 3)  Tumor mass extends laterally along the subcapsular border of the liver, along the peritoneal margin, and into the right upper quadrant omentum  In the left upper abdomen there is amorphous left para-aortic tumor mass inseparable from the left adrenal gland and the left diaphragmatic maggie measuring approximately 2 8 x 2 3 cm on image 53 of series 3  Infiltrative tumor mass in the otto hepatis results in high-grade intrahepatic biliary obstruction as well as encasement of the hepatic artery  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  Hepatic veins appear patent  ABDOMEN LOWER CHEST:  No significant abnormalities identified in the lower chest  LIVER/BILIARY TREE:  See above description of infiltrating tumor mass throughout the otto hepatis, along the undersurface of the right hepatic lobe, and along the subcapsular margin of the right hepatic lobe  High-grade biliary dilatation  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  Encasement of hepatic artery  GALLBLADDER:  High density within the lumen of the gallbladder probably representing inspissated sludge  No gallbladder wall thickening  There is probable encasement of the cystic duct by infiltrative tumor mass in the right upper abdomen  SPLEEN:  Intrinsically unremarkable  Trace perisplenic fluid noted  PANCREAS:  Pancreatic tail tumor mass with atrophy of the more distal pancreatic tail  ADRENAL GLANDS:  Tumor inseparable from left adrenal gland  Unremarkable right adrenal gland  KIDNEYS/URETERS:  There is delayed left renal nephrogram with left-sided hydroureteronephrosis to the level of the left hemipelvis where tumor mass is present  See description of tumor below  STOMACH AND BOWEL:  Wall thickening involving the sigmoid colon in the left hemipelvis suspicious for tumor mass  It is unclear whether this represents primary colonic adenocarcinoma or perhaps serosal wall infiltrating tumor in this person with extensive intraperitoneal infiltrating tumor masses in the upper abdomen  No bowel obstruction  Sigmoid diverticulosis without acute diverticulitis  APPENDIX:  No findings to suggest appendicitis  ABDOMINOPELVIC CAVITY:  See above description of extensive infiltrating tumor mass  VESSELS:  Extensive atherosclerotic disease    Multiple penetrating atherosclerotic ulcer is noted  PELVIS REPRODUCTIVE ORGANS:  Prostatomegaly with slight invagination into the urinary bladder base  URINARY BLADDER:  Unremarkable  ABDOMINAL WALL/INGUINAL REGIONS:  Unremarkable  OSSEOUS STRUCTURES:  Advanced right hip osteoarthritis is noted  Lumbar degenerative changes are seen  No destructive osseous lesion is evident  Impression: Extensive infiltrating tumor mass of unknown primary involving upper abdominal organs, omentum and peritoneal margin, left para-aortic nodes, as well as a long segment of sigmoid colon in left hemipelvis  As a result of infiltrating tumor mass there is high-grade intrahepatic biliary dilatation to the level of the otto hepatis as well as left-sided hydroureteronephrosis level of the left hemipelvis  I personally discussed this study with David Loaiza on 1/30/2018 10:20 AM  Workstation performed: [id number]       Medications   Scheduled Meds:  Current Facility-Administered Medications:  acetaminophen 650 mg Oral Q6H PRN Danielle Quinn MD   albuterol 2 puff Inhalation Q4H PRN Danielle Quinn MD   docusate sodium 100 mg Oral BID Danielle Quinn MD   enoxaparin 40 mg Subcutaneous Daily Danielle Quinn MD   HYDROmorphone 0 5 mg Intravenous Q3H PRN Danielle Quinn MD   morphine injection 4 mg Intravenous Once Oral Wang DO   ondansetron 4 mg Intravenous Q6H PRN Danielle Quinn MD   oxyCODONE 5 mg Oral Q4H PRN Danielle Quinn MD   polyethylene glycol 17 g Oral Daily Danielle Quinn MD   senna 1 tablet Oral Daily Danielle Quinn MD     Continuous Infusions:   PRN Meds:   acetaminophen    albuterol    HYDROmorphone    ondansetron    oxyCODONE      Total time spent with patient 20 minutes, >50% spent counseling and/or coordination of care           ABI Grubbs

## 2018-02-01 NOTE — CONSULTS
Oncology Consult Note  Janie Redman 71 y o  male MRN: [id number]  Unit/Bed#: -01 Encounter: [id number]      Presenting Complaint:  Weight loss, abdominal distention, jaundice    History of Presenting Illness:  79-year-old  male who noticed around Thanksgiving of 2017 loss of appetite, weight loss he lost about 30 lb over the past 2-3 months, his wife noticed abdominal distention, most recently he had darkening of the urine, and jaundice, he was seen by Dr Omkar Lewis  The patient was admitted to the hospital, a CT scan of the abdomen and pelvis in January 2017 showed infiltrating soft tissue tumor mass with spiculated margins involving multiple organs in the upper abdomen the primary could not be elucidated the mass involving the pancreatic tail measuring 3 6 x 2 3 cm, the greater curvature of the stomach and extends along the greater curvature of the stomach with encasement of the otto hepaticus involving the otto hepaticus with occlusion of the main portal vein, infiltration along the undersurface of the right hepatic lobe, subcapsular of the liver right upper quadrant omentum left para-aortic tumor mass could not be distinguished from the left adrenal gland measuring 2 8 x 2 3 cm, left hydronephrosis, high-grade intra hepatic biliary obstruction and encasement of the hepatic artery, prostate enlargement  CEA 4 2, creatinine 1 34, AST 80, , alkaline phosphatase 467, total bilirubin 14 9, hemoglobin 10 5, MCV 85, RDW 18 5, platelets 338, WBC 65 8, 80% neutrophils  He reported change in bowel habits between constipation and normal bowel habits however he said that he felt like a mass in the anal area  CA 19 9 is pending  The patient smokes intermittently, he drinks alcohol occasionally    Review of Systems - As stated in the HPI otherwise the fourteen point review of systems was negative      Past Medical History:   Diagnosis Date    COPD (chronic obstructive pulmonary disease) ([facility] [address] )     KVNG (degenerative joint disease)        Social History     Social History    Marital status: Single     Spouse name: N/A    Number of children: N/A    Years of education: N/A     Social History Main Topics    Smoking status: Former Smoker    Smokeless tobacco: Never Used    Alcohol use No    Drug use: No    Sexual activity: Not Asked     Other Topics Concern    None     Social History Narrative    None       History reviewed  No pertinent family history      Allergies   Allergen Reactions    Sulfa Antibiotics Rash         Current Facility-Administered Medications:     acetaminophen (TYLENOL) tablet 650 mg, 650 mg, Oral, Q6H PRN, Po Gan MD, 650 mg at 02/01/18 0734    albuterol (PROVENTIL HFA,VENTOLIN HFA) inhaler 2 puff, 2 puff, Inhalation, Q4H PRN, Po Gan MD    docusate sodium (COLACE) capsule 100 mg, 100 mg, Oral, BID, Po Gan MD, 100 mg at 02/01/18 0846    enoxaparin (LOVENOX) subcutaneous injection 40 mg, 40 mg, Subcutaneous, Daily, Po Gan MD, 40 mg at 02/01/18 0846    HYDROmorphone (DILAUDID) injection 0 5 mg, 0 5 mg, Intravenous, Q3H PRN, Po Gan MD    morphine (PF) 4 mg/mL injection 4 mg, 4 mg, Intravenous, Once, Taniya Nichols DO    ondansetron [facility]) injection 4 mg, 4 mg, Intravenous, Q6H PRN, Po Gan MD    oxyCODONE (ROXICODONE) oral solution 5 mg, 5 mg, Oral, Q4H PRN, Po Gan MD    polyethylene glycol (MIRALAX) packet 17 g, 17 g, Oral, Daily, Po Gan MD, Stopped at 02/01/18 4448    senna (SENOKOT) tablet 8 6 mg, 1 tablet, Oral, Daily, Po Gan MD, Stopped at 02/01/18 0834    sodium chloride 0 9 % infusion, 90 mL/hr, Intravenous, Continuous, Po Gan MD, Last Rate: 90 mL/hr at 01/31/18 2348, 90 mL/hr at 01/31/18 2348      /77 (BP Location: Left arm)   Pulse 67   Temp 98 5 °F (36 9 °C) (Oral)   Resp 18   Ht 5' 8" (1 727 m)   Wt 56 8 kg (125 lb 3 5 oz)   SpO2 97%   BMI 19 04 kg/m²       General Appearance:    Alert, oriented , jaundice       Eyes:    PERRL, icterus   Ears:    Normal external ear canals, both ears   Nose:   Nares normal, septum midline   Throat:   Mucosa moist  Pharynx without injection  Neck:   Supple       Lungs:     Clear to auscultation bilaterally   Chest Wall:    No tenderness or deformity    Heart:    Regular rate and rhythm       Abdomen:     Distended, tender in the right upper quadrant, left upper quadrant, could not feel a specific mass per se           Extremities:   Extremities no cyanosis or edema       Skin:   no rash or icterus  Lymph nodes:   Cervical, supraclavicular, and axillary nodes normal   Neurologic:   CNII-XII intact, normal strength, sensation and reflexes     Throughout               Recent Results (from the past 48 hour(s))   Green / Yellow tube on hold    Collection Time: 01/31/18  8:20 PM   Result Value Ref Range    Extra Tube Hold for add-ons  Green / Black tube on hold    Collection Time: 01/31/18  8:20 PM   Result Value Ref Range    Extra Tube Hold for add-ons  Lavender Top on hold    Collection Time: 01/31/18  8:20 PM   Result Value Ref Range    Extra Tube Hold for add-ons      Bilirubin, direct    Collection Time: 01/31/18  8:20 PM   Result Value Ref Range    Bilirubin, Direct 12 57 (H) 0 00 - 0 20 mg/dL   CBC and differential    Collection Time: 01/31/18  8:20 PM   Result Value Ref Range    WBC 17 11 (H) 4 31 - 10 16 Thousand/uL    RBC 3 42 (L) 3 88 - 5 62 Million/uL    Hemoglobin 10 5 (L) 12 0 - 17 0 g/dL    Hematocrit 29 0 (L) 36 5 - 49 3 %    MCV 85 82 - 98 fL    MCH 30 7 26 8 - 34 3 pg    MCHC 36 2 31 4 - 37 4 g/dL    RDW 18 5 (H) 11 6 - 15 1 %    MPV 10 5 8 9 - 12 7 fL    Platelets 507 054 - 442 Thousands/uL    nRBC 0 /100 WBCs    Neutrophils Relative 80 (H) 43 - 75 %    Lymphocytes Relative 11 (L) 14 - 44 %    Monocytes Relative 8 4 - 12 %    Eosinophils Relative 0 0 - 6 %    Basophils Relative 1 0 - 1 % Neutrophils Absolute 13 47 (H) 1 85 - 7 62 Thousands/µL    Lymphocytes Absolute 1 92 0 60 - 4 47 Thousands/µL    Monocytes Absolute 1 44 (H) 0 17 - 1 22 Thousand/µL    Eosinophils Absolute 0 04 0 00 - 0 61 Thousand/µL    Basophils Absolute 0 09 0 00 - 0 10 Thousands/µL   Comprehensive metabolic panel    Collection Time: 01/31/18  8:20 PM   Result Value Ref Range    Sodium 134 (L) 136 - 145 mmol/L    Potassium 3 7 3 5 - 5 3 mmol/L    Chloride 100 100 - 108 mmol/L    CO2 25 21 - 32 mmol/L    Anion Gap 9 4 - 13 mmol/L    BUN 20 5 - 25 mg/dL    Creatinine 1 34 (H) 0 60 - 1 30 mg/dL    Glucose 110 65 - 140 mg/dL    Calcium 8 9 8 3 - 10 1 mg/dL    AST 80 (H) 5 - 45 U/L     (H) 12 - 78 U/L    Alkaline Phosphatase 467 (H) 46 - 116 U/L    Total Protein 7 5 6 4 - 8 2 g/dL    Albumin 2 1 (L) 3 5 - 5 0 g/dL    Total Bilirubin 14 95 (H) 0 20 - 1 00 mg/dL    eGFR 53 ml/min/1 73sq m   CEA    Collection Time: 01/31/18  8:20 PM   Result Value Ref Range    CEA 4 1 (H) 0 0 - 3 0 ng/mL   UA  w Reflex to Microscopic    Collection Time: 01/31/18  9:45 PM   Result Value Ref Range    Color, UA Orange     Clarity, UA Cloudy     Specific [place], UA 1 028 1 003 - 1 030    pH, UA 5 5 4 5 - 8 0    Leukocytes, UA Small (A) Negative    Nitrite, UA Positive (A) Negative    Protein, UA Trace (A) Negative mg/dl    Glucose, UA Negative Negative mg/dl    Ketones, UA Trace (A) Negative mg/dl    Urobilinogen, UA 1 0 0 2, 1 0 E U /dl E U /dl    Bilirubin, UA Interference- unable to analyze (A) Negative    Blood, UA Negative Negative   Urine Microscopic    Collection Time: 01/31/18  9:45 PM   Result Value Ref Range    RBC, UA None Seen None Seen, 0-5 /hpf    WBC, UA 2-4 (A) None Seen, 0-5, 5-55, 5-65 /hpf    Epithelial Cells Occasional None Seen, Occasional /hpf    Bacteria, UA None Seen None Seen, Occasional /hpf    Hyaline Casts, UA 0-3 (A) None Seen /lpf   Comprehensive metabolic panel    Collection Time: 02/01/18  5:14 AM   Result Value Ref Range    Sodium 136 136 - 145 mmol/L    Potassium 3 8 3 5 - 5 3 mmol/L    Chloride 103 100 - 108 mmol/L    CO2 24 21 - 32 mmol/L    Anion Gap 9 4 - 13 mmol/L    BUN 23 5 - 25 mg/dL    Creatinine 1 32 (H) 0 60 - 1 30 mg/dL    Glucose 104 65 - 140 mg/dL    Calcium 8 6 8 3 - 10 1 mg/dL    AST 74 (H) 5 - 45 U/L     (H) 12 - 78 U/L    Alkaline Phosphatase 434 (H) 46 - 116 U/L    Total Protein 6 9 6 4 - 8 2 g/dL    Albumin 1 9 (L) 3 5 - 5 0 g/dL    Total Bilirubin 13 95 (H) 0 20 - 1 00 mg/dL    eGFR 54 ml/min/1 73sq m   CBC (With Platelets)    Collection Time: 02/01/18  5:14 AM   Result Value Ref Range    WBC 13 63 (H) 4 31 - 10 16 Thousand/uL    RBC 3 40 (L) 3 88 - 5 62 Million/uL    Hemoglobin 10 2 (L) 12 0 - 17 0 g/dL    Hematocrit 29 1 (L) 36 5 - 49 3 %    MCV 86 82 - 98 fL    MCH 30 0 26 8 - 34 3 pg    MCHC 35 1 31 4 - 37 4 g/dL    RDW 18 6 (H) 11 6 - 15 1 %    Platelets 103 507 - 063 Thousands/uL    MPV 11 0 8 9 - 12 7 fL         Ct Abdomen Pelvis W Contrast    Result Date: 1/30/2018  Narrative: CT ABDOMEN AND PELVIS WITH IV CONTRAST INDICATION:  Jaundice  Constipation  COMPARISON: None  TECHNIQUE:  CT examination of the abdomen and pelvis was performed  Scanning through the abdomen was performed in arterial, venous and delayed phases according a protocol spefically designed to evaluate upper abdominal viscera  Reformatted images were created in axial, sagittal, and coronal planes  Radiation dose length product (DLP) for this visit:  724 38 mGy-cm   This examination, like all CT scans performed in the [facility], was performed utilizing techniques to minimize radiation dose exposure, including the use of iterative  reconstruction and automated exposure control  IV Contrast:  100 mL of iohexol (OMNIPAQUE)  Because of borderline renal function, standard department hydration protocol was recommended to minimize risk of contrast induced nephropathy      Enteric Contrast:  Enteric contrast was administered  FINDINGS: There is sigmoid wall thickening with pericolonic inflammatory edema  The appearance is suspicious for sigmoid tumor mass especially given extensive tumor mass in the upper abdomen described in greater detail below  There is left-sided hydroureteronephrosis to the site of left sigmoid infiltrating tumor  There is infiltrating soft tissue tumor mass with spiculated margins and desmoplastic response involving multiple organs in the upper abdomen  Given involvement of multiple upper abdominal organs, the primary origin of the tumor mass cannot be elucidated with certainty on the basis of this examination  Spiculated tumor mass involves the pancreatic tail (tumor in the pancreatic tail measures 3 6 x 2 3 cm on image 49 of series 3), the greater curvature of the stomach (a portion of the spiculated tumor mass inseparable from the cervical wall thickening curvature of the stomach measures 4 1 x 4 1 cm on image 44 series 3), extends along the greater curvature of the stomach with encasement of the otto hepatis, fills the otto hepatis where there is occlusion of the main portal vein (well demonstrated on coronal image 63 of series 603) with infiltrative tumor mass extending along the undersurface of the right hepatic lobe (tumor mass along the undersurface of right hepatic lobe measures 6 1 x 3 3 cm on image 55 of series 3)  Tumor mass extends laterally along the subcapsular border of the liver, along the peritoneal margin, and into the right upper quadrant omentum  In the left upper abdomen there is amorphous left para-aortic tumor mass inseparable from the left adrenal gland and the left diaphragmatic maggie measuring approximately 2 8 x 2 3 cm on image 53 of series 3  Infiltrative tumor mass in the otto hepatis results in high-grade intrahepatic biliary obstruction as well as encasement of the hepatic artery  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    Hepatic veins appear patent  ABDOMEN LOWER CHEST:  No significant abnormalities identified in the lower chest  LIVER/BILIARY TREE:  See above description of infiltrating tumor mass throughout the otto hepatis, along the undersurface of the right hepatic lobe, and along the subcapsular margin of the right hepatic lobe  High-grade biliary dilatation  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  Encasement of hepatic artery  GALLBLADDER:  High density within the lumen of the gallbladder probably representing inspissated sludge  No gallbladder wall thickening  There is probable encasement of the cystic duct by infiltrative tumor mass in the right upper abdomen  SPLEEN:  Intrinsically unremarkable  Trace perisplenic fluid noted  PANCREAS:  Pancreatic tail tumor mass with atrophy of the more distal pancreatic tail  ADRENAL GLANDS:  Tumor inseparable from left adrenal gland  Unremarkable right adrenal gland  KIDNEYS/URETERS:  There is delayed left renal nephrogram with left-sided hydroureteronephrosis to the level of the left hemipelvis where tumor mass is present  See description of tumor below  STOMACH AND BOWEL:  Wall thickening involving the sigmoid colon in the left hemipelvis suspicious for tumor mass  It is unclear whether this represents primary colonic adenocarcinoma or perhaps serosal wall infiltrating tumor in this person with extensive intraperitoneal infiltrating tumor masses in the upper abdomen  No bowel obstruction  Sigmoid diverticulosis without acute diverticulitis  APPENDIX:  No findings to suggest appendicitis  ABDOMINOPELVIC CAVITY:  See above description of extensive infiltrating tumor mass  VESSELS:  Extensive atherosclerotic disease  Multiple penetrating atherosclerotic ulcer is noted  PELVIS REPRODUCTIVE ORGANS:  Prostatomegaly with slight invagination into the urinary bladder base  URINARY BLADDER:  Unremarkable  ABDOMINAL WALL/INGUINAL REGIONS:  Unremarkable   OSSEOUS STRUCTURES: Advanced right hip osteoarthritis is noted  Lumbar degenerative changes are seen  No destructive osseous lesion is evident  Impression: Extensive infiltrating tumor mass of unknown primary involving upper abdominal organs, omentum and peritoneal margin, left para-aortic nodes, as well as a long segment of sigmoid colon in left hemipelvis  As a result of infiltrating tumor mass there is high-grade intrahepatic biliary dilatation to the level of the otto hepatis as well as left-sided hydroureteronephrosis level of the left hemipelvis  I personally discussed this study with Everett Guadalupe on 1/30/2018 10:20 AM  Workstation performed: [id number]     Assessment and plan:  1  Extensive masses in the upper abdomen area with extension into the otto hepaticus area and dilatation of the intra hepatic bile duct, with painless obstructive jaundice in a patient who had been losing weight about 30 lb in the past 2-3 months, CT scan showed extensive infiltrating tumor involving the upper abdominal organs, omentum, possible peritoneal carcinomatosis, left para-aortic nodes, left hydronephrosis, possible sigmoid colon involvement, CEA 4 2  2   GI consult on board for possible ERCP or PTCA to alleviate obstructive jaundice  3  Urology consult for left hydronephrosis, he has enlarged prostate, PSA is ordered  4   CA 19-9 is pending  5    Await for pathology he might need an IR core biopsy for definitive diagnosis and will proceed from there

## 2018-02-01 NOTE — PROGRESS NOTES
Progress Note - Malik Henning 1945, 71 y o  male MRN: [id number]    Unit/Bed#: -01 Encounter: [id number]    Primary Care Provider: Aaron Ramirez DO   Date and time admitted to hospital: 1/31/2018  7:35 PM        Weight loss, non-intentional   Assessment & Plan    Secondary to above( decreased oral intake with evidence of malignancy unknown primary origin)  Nutritionist consult placed        Fatigue   Assessment & Plan    Secondary underlying malignancy        Chronic obstructive pulmonary disease (HCC)   Assessment & Plan    Continue albuterol   Respiratory protocol        * Abnormal CT of the abdomen   Assessment & Plan    Extensive infiltrating tumor with high grade intra hepatic biliary obstruction and left hydro  GI consultation for ? ERCP  Urology consultation for hydro  Tumor markers pending  Oncology on board        Abnormal LFTs   Assessment & Plan    As above        Jaundice   Assessment & Plan    Painless obstructive jaundice  GI consult for ? ERCP        UTI (urinary tract infection)   Assessment & Plan    Abnormal UA, leukocytosis and dysuria  Will start rocephin  Await urine cx results          VTE Pharmacologic Prophylaxis:   Pharmacologic: Enoxaparin (Lovenox)  Mechanical VTE Prophylaxis in Place: Yes    Patient Centered Rounds: I have performed bedside rounds with nursing staff today  Education and Discussions with Family / Patient: patient and wife    Time Spent for Care: 30 minutes  More than 50% of total time spent on counseling and coordination of care as described above      Current Length of Stay: 1 day(s)    Current Patient Status: Inpatient   Certification Statement: The patient will continue to require additional inpatient hospital stay due to ERCP, w/u obstructive jaundice      Code Status: Level 1 - Full Code      Subjective:   No acute events  No new complaints  On ROS: some dysuria and frequency  Mild abd discomfort, suprapubia    Objective:     Vitals:   Temp (24hrs), Av 2 °F (36 8 °C), Min:97 9 °F (36 6 °C), Max:98 5 °F (36 9 °C)    HR:  [67-84] 67  Resp:  [18] 18  BP: (137-167)/(77-89) 137/77  SpO2:  [97 %-100 %] 97 %  Body mass index is 19 04 kg/m²  Input and Output Summary (last 24 hours): Intake/Output Summary (Last 24 hours) at 18 1031  Last data filed at 18 0734   Gross per 24 hour   Intake              150 ml   Output                0 ml   Net              150 ml       Physical Exam:     Physical Exam  No distress  Jaundiced  Poor dentition  RRR  Clear b/l  Soft, +bs, distended  No edema  Calm and cooperative, polite  Distal pulses intact  No joint swelling    Additional Data:     Labs:      Results from last 7 days  Lab Units 18  0514 18   WBC Thousand/uL 13 63* 17 11*   HEMOGLOBIN g/dL 10 2* 10 5*   HEMATOCRIT % 29 1* 29 0*   PLATELETS Thousands/uL 287 318   NEUTROS PCT %  --  80*   LYMPHS PCT %  --  11*   MONOS PCT %  --  8   EOS PCT %  --  0       Results from last 7 days  Lab Units 18  0514   SODIUM mmol/L 136   POTASSIUM mmol/L 3 8   CHLORIDE mmol/L 103   CO2 mmol/L 24   BUN mg/dL 23   CREATININE mg/dL 1 32*   CALCIUM mg/dL 8 6   TOTAL PROTEIN g/dL 6 9   BILIRUBIN TOTAL mg/dL 13 95*   ALK PHOS U/L 434*   ALT U/L 100*   AST U/L 74*   GLUCOSE RANDOM mg/dL 104           * I Have Reviewed All Lab Data Listed Above  * Additional Pertinent Lab Tests Reviewed:  Nohemy 67 Admission Reviewed    Imaging:    Imaging Reports Reviewed Today Include: CT A/P      Recent Cultures (last 7 days):           Last 24 Hours Medication List:     Current Facility-Administered Medications:  acetaminophen 650 mg Oral Q6H PRN Ari Rolon MD    albuterol 2 puff Inhalation Q4H PRN Ari Rolon MD    docusate sodium 100 mg Oral BID Ari Rolon MD    enoxaparin 40 mg Subcutaneous Daily Ari Rolon MD    HYDROmorphone 0 5 mg Intravenous Q3H PRN Ari Rolon MD    morphine injection 4 mg Intravenous Once Santy Nessa, DO    ondansetron 4 mg Intravenous Q6H PRN Gilmar Chairez MD    oxyCODONE 5 mg Oral Q4H PRN Gilmar Chairez MD    polyethylene glycol 17 g Oral Daily Gilmar Chairez MD    senna 1 tablet Oral Daily Gilmar Chairez MD    sodium chloride 90 mL/hr Intravenous Continuous Gilmar Chairez MD Last Rate: 90 mL/hr (01/31/18 3751)        Today, Patient Was Seen By: Franko Mckeon MD    ** Please Note: Dictation voice to text software may have been used in the creation of this document   **

## 2018-02-01 NOTE — ASSESSMENT & PLAN NOTE
Secondary to #1( decreased oral intake with evidence of malignancy unknown primary origin)  Nutritionist consult placed

## 2018-02-01 NOTE — PLAN OF CARE
Problem: PAIN - ADULT  Goal: Verbalizes/displays adequate comfort level or baseline comfort level  Interventions:  - Encourage patient to monitor pain and request assistance  - Assess pain using appropriate pain scale  - Administer analgesics based on type and severity of pain and evaluate response  - Implement non-pharmacological measures as appropriate and evaluate response  - Consider cultural and social influences on pain and pain management  - Notify physician/advanced practitioner if interventions unsuccessful or patient reports new pain  Outcome: Progressing      Problem: INFECTION - ADULT  Goal: Absence or prevention of progression during hospitalization  INTERVENTIONS:  - Assess and monitor for signs and symptoms of infection  - Monitor lab/diagnostic results  - Monitor all insertion sites, i e  indwelling lines, tubes, and drains  - Monitor endotracheal (as able) and nasal secretions for changes in amount and color  - [place] appropriate cooling/warming therapies per order  - Administer medications as ordered  - Instruct and encourage patient and family to use good hand hygiene technique  - Identify and instruct in appropriate isolation precautions for identified infection/condition  Outcome: Progressing    Goal: Absence of fever/infection during neutropenic period  INTERVENTIONS:  - Monitor WBC  - Implement neutropenic guidelines  Outcome: Progressing      Problem: SAFETY ADULT  Goal: Patient will remain free of falls  INTERVENTIONS:  - Assess patient frequently for physical needs  -  Identify cognitive and physical deficits and behaviors that affect risk of falls    -  [place] fall precautions as indicated by assessment   - Educate patient/family on patient safety including physical limitations  - Instruct patient to call for assistance with activity based on assessment  - Modify environment to reduce risk of injury  - Consider OT/PT consult to assist with strengthening/mobility  Outcome: Progressing    Goal: Maintain or return to baseline ADL function  INTERVENTIONS:  -  Assess patient's ability to carry out ADLs; assess patient's baseline for ADL function and identify physical deficits which impact ability to perform ADLs (bathing, care of mouth/teeth, toileting, grooming, dressing, etc )  - Assess/evaluate cause of self-care deficits   - Assess range of motion  - Assess patient's mobility; develop plan if impaired  - Assess patient's need for assistive devices and provide as appropriate  - Encourage maximum independence but intervene and supervise when necessary  ¯ Involve family in performance of ADLs  ¯ Assess for home care needs following discharge   ¯ Request OT consult to assist with ADL evaluation and planning for discharge  ¯ Provide patient education as appropriate  Outcome: Progressing    Goal: Maintain or return mobility status to optimal level  INTERVENTIONS:  - Assess patient's baseline mobility status (ambulation, transfers, stairs, etc )    - Identify cognitive and physical deficits and behaviors that affect mobility  - Identify mobility aids required to assist with transfers and/or ambulation (gait belt, sit-to-stand, lift, walker, cane, etc )  - [place] fall precautions as indicated by assessment  - Record patient progress and toleration of activity level on Mobility SBAR; progress patient to next Phase/Stage  - Instruct patient to call for assistance with activity based on assessment  - Request Rehabilitation consult to assist with strengthening/weightbearing, etc   Outcome: Progressing      Problem: DISCHARGE PLANNING  Goal: Discharge to home or other facility with appropriate resources  INTERVENTIONS:  - Identify barriers to discharge w/patient and caregiver  - Arrange for needed discharge resources and transportation as appropriate  - Identify discharge learning needs (meds, wound care, etc )  - Arrange for interpretive services to assist at discharge as needed  - Refer to Case Management Department for coordinating discharge planning if the patient needs post-hospital services based on physician/advanced practitioner order or complex needs related to functional status, cognitive ability, or social support system  Outcome: Progressing

## 2018-02-01 NOTE — CONSULTS
Consultation - [facility]  Tarik Merlin 71 y o  male MRN: [id number]  Unit/Bed#: -01 Encounter: [id number]        Consults    ASSESSMENT/ PLAN:    72 yo male pmh COPD being evaluated for painless jaundice and elevated LFTs    1  Weight loss with abnormal CT:  CT revealed 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 site with involvement of upper abdominal organs including the tail of the pancreas, omentum, peritoneal margin, left para-aortic nodes and long segment of sigmoid colon  CEA 4 1,  pending  -will need bx to determine primary site  -outpatient follow up with oncology     2  Painless Jaundice with elevated LFTs: secondary to #1 and tumor involvement of the otto hepatis resulting in high-grade intrahepatic biliary obstruction as well as encasement of the hepatic artery and occlusion of the main portal vein  AST 74, , alk phos of 434 and tbili of 13 95  He does have a white count of 13 today but fortunately no signs of cholangitis  Given intrahepatic biliary dilatation and obstruction, will require ERCP with stent and brushings vs PTC drain by IR and needle biopsy  Will discuss further with attending  -diet as tolerated  -monitor LFTs  -ERCP with stent vs PTC drain for obstructive jaundice    2  Sigmoid wall thickening: CT revealed long segment sigmoid wall thickening in left hemipelvis with pericolonic inflammatory edema suspicious for sigmoid tumor mass  CEA of 4  He does admit to recent change in bowel habits  -consider colonoscopy for further evaluation prior to discharge     Reason for Consult / Principal Problem: painless jaundice    HPI: Chava Vinson is a 72 yo male with pmh COPD who presented to his PCP within the past month for painless jaundice  He admits to recent change in bowel habits, a 30 lb weight loss since October with poor appetite and abdominal distention      CT abdomen ordered by his PCP revealed 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 site with involvement of upper abdominal organs including the tail of the pancreas, omentum, peritoneal margin, left para-aortic nodes and long segment of sigmoid colon  His lab work on admission was as follows: AST 80, , alk phos 467, tbili of 14 95  His CEA was 4 1 and there is a CA 19-9 pending  He has never had an EGD in the past, he states he did have a colonoscopy in the past, which he thinks was about 10-15 years ago that was normal      He is a social drinker and admits to heavy smoking hx with recent cessation in last few months  REVIEW OF SYSTEMS:     CONSTITUTIONAL: Denies any fever, chills, or rigors  Poor appetite, 30 lb weight loss since October  HEENT: No earache or tinnitus  Denies hearing loss or visual disturbances  CARDIOVASCULAR: No chest pain or palpitations  RESPIRATORY: Denies any cough, hemoptysis, shortness of breath or dyspnea on exertion  GASTROINTESTINAL: As noted in the History of Present Illness  GENITOURINARY: No problems with urination  Denies any hematuria or dysuria  Dark urine  NEUROLOGIC: No dizziness or vertigo, denies headaches  MUSCULOSKELETAL: Denies any muscle or joint pain  SKIN: Denies skin rashes or itching  ENDOCRINE: Denies excessive thirst  Denies intolerance to heat or cold  PSYCHOSOCIAL: Denies depression or anxiety  Denies any recent memory loss  Historical Information   Past Medical History:   Diagnosis Date    COPD (chronic obstructive pulmonary disease) ([facility] [address] )     DJD (degenerative joint disease)      History reviewed  No pertinent surgical history  Social History   History   Alcohol Use No     History   Drug Use No     History   Smoking Status    Former Smoker   Smokeless Tobacco    Never Used     History reviewed  No pertinent family history      Meds/Allergies     Prescriptions Prior to Admission   Medication    albuterol (VENTOLIN HFA) 90 mcg/act inhaler    Multiple Vitamins-Minerals (MULTI COMPLETE PO)    naproxen (NAPROSYN) 500 mg tablet     Current Facility-Administered Medications   Medication Dose Route Frequency    acetaminophen (TYLENOL) tablet 650 mg  650 mg Oral Q6H PRN    albuterol (PROVENTIL HFA,VENTOLIN HFA) inhaler 2 puff  2 puff Inhalation Q4H PRN    docusate sodium (COLACE) capsule 100 mg  100 mg Oral BID    enoxaparin (LOVENOX) subcutaneous injection 40 mg  40 mg Subcutaneous Daily    HYDROmorphone (DILAUDID) injection 0 5 mg  0 5 mg Intravenous Q3H PRN    morphine (PF) 4 mg/mL injection 4 mg  4 mg Intravenous Once    ondansetron (ZOFRAN) injection 4 mg  4 mg Intravenous Q6H PRN    oxyCODONE (ROXICODONE) oral solution 5 mg  5 mg Oral Q4H PRN    polyethylene glycol (MIRALAX) packet 17 g  17 g Oral Daily    senna (SENOKOT) tablet 8 6 mg  1 tablet Oral Daily       Allergies   Allergen Reactions    Sulfa Antibiotics Rash           Objective     Blood pressure 137/77, pulse 67, temperature 98 5 °F (36 9 °C), temperature source Oral, resp  rate 18, height 5' 8" (1 727 m), weight 56 8 kg (125 lb 3 5 oz), SpO2 97 %  Intake/Output Summary (Last 24 hours) at 02/01/18 1313  Last data filed at 02/01/18 1300   Gross per 24 hour   Intake           1575 5 ml   Output                0 ml   Net           1575 5 ml         PHYSICAL EXAM:      General Appearance:   A&Ox3, cooperative, no distress, appears stated age    HEENT:   Normocephalic, atraumatic, scleral icterus      Neck:  Supple, symmetrical, trachea midline   Lungs:   Clear to auscultation bilaterally; no rales, rhonchi or wheezing    Heart[de-identified]   S1 and S2 normal; regular rate and rhythm; no murmur, rub, or gallop  Abdomen:   Soft, diffuse abdominal discomfort in LLQ and RUQ, distended; normal bowel sounds; no masses, no organomegaly    Genitalia:   Deferred    Rectal:   Deferred    Extremities:  No cyanosis, clubbing or edema    Pulses:  2+ and symmetric all extremities    Skin:  Skin texture, turgor normal, no rashes or lesions  Jaundiced          Lab Results:   Admission on 01/31/2018   Component Date Value    Extra Tube 01/31/2018 Hold for add-ons   Extra Tube 01/31/2018 Hold for add-ons   Extra Tube 01/31/2018 Hold for add-ons       Bilirubin, Direct 01/31/2018 12 57*    WBC 01/31/2018 17 11*    RBC 01/31/2018 3 42*    Hemoglobin 01/31/2018 10 5*    Hematocrit 01/31/2018 29 0*    MCV 01/31/2018 85     MCH 01/31/2018 30 7     MCHC 01/31/2018 36 2     RDW 01/31/2018 18 5*    MPV 01/31/2018 10 5     Platelets 60/28/8259 318     nRBC 01/31/2018 0     Neutrophils Relative 01/31/2018 80*    Lymphocytes Relative 01/31/2018 11*    Monocytes Relative 01/31/2018 8     Eosinophils Relative 01/31/2018 0     Basophils Relative 01/31/2018 1     Neutrophils Absolute 01/31/2018 13 47*    Lymphocytes Absolute 01/31/2018 1 92     Monocytes Absolute 01/31/2018 1 44*    Eosinophils Absolute 01/31/2018 0 04     Basophils Absolute 01/31/2018 0 09     Sodium 01/31/2018 134*    Potassium 01/31/2018 3 7     Chloride 01/31/2018 100     CO2 01/31/2018 25     Anion Gap 01/31/2018 9     BUN 01/31/2018 20     Creatinine 01/31/2018 1 34*    Glucose 01/31/2018 110     Calcium 01/31/2018 8 9     AST 01/31/2018 80*    ALT 01/31/2018 110*    Alkaline Phosphatase 01/31/2018 467*    Total Protein 01/31/2018 7 5     Albumin 01/31/2018 2 1*    Total Bilirubin 01/31/2018 14 95*    eGFR 01/31/2018 53     Color, UA 01/31/2018 Orange     Clarity, UA 01/31/2018 Cloudy     Specific Gravity, UA 01/31/2018 1 028     pH, UA 01/31/2018 5 5     Leukocytes, UA 01/31/2018 Small*    Nitrite, UA 01/31/2018 Positive*    Protein, UA 01/31/2018 Trace*    Glucose, UA 01/31/2018 Negative     Ketones, UA 01/31/2018 Trace*    Urobilinogen, UA 01/31/2018 1 0     Bilirubin, UA 01/31/2018 Interference- unable to analyze*    Blood, UA 01/31/2018 Negative     RBC, UA 01/31/2018 None Seen     WBC, UA 01/31/2018 2-4*    Epithelial Cells 01/31/2018 Occasional     Bacteria, UA 01/31/2018 None Seen     Hyaline Casts, UA 01/31/2018 0-3*    CEA 01/31/2018 4 1*    Sodium 02/01/2018 136     Potassium 02/01/2018 3 8     Chloride 02/01/2018 103     CO2 02/01/2018 24     Anion Gap 02/01/2018 9     BUN 02/01/2018 23     Creatinine 02/01/2018 1 32*    Glucose 02/01/2018 104     Calcium 02/01/2018 8 6     AST 02/01/2018 74*    ALT 02/01/2018 100*    Alkaline Phosphatase 02/01/2018 434*    Total Protein 02/01/2018 6 9     Albumin 02/01/2018 1 9*    Total Bilirubin 02/01/2018 13 95*    eGFR 02/01/2018 54     WBC 02/01/2018 13 63*    RBC 02/01/2018 3 40*    Hemoglobin 02/01/2018 10 2*    Hematocrit 02/01/2018 29 1*    MCV 02/01/2018 86     MCH 02/01/2018 30 0     MCHC 02/01/2018 35 1     RDW 02/01/2018 18 6*    Platelets 38/28/5780 287     MPV 02/01/2018 11 0     PSA 02/01/2018 0 6        Imaging Studies: I have personally reviewed pertinent imaging studies  No results found  This patient was seen and examined by Dr Dylan Murphy  All jefferson medical decisions were made by Dr Dylan Murphy  Thank you for allowing us to participate in the care of this patient  We will follow up closely with you

## 2018-02-01 NOTE — SOCIAL WORK
CM met with the Pt and GF at bedside to explain the CM role and discuss any potential D/C needs  Pt lives in a 2 story home with 2nd floor bathroom and ramp for entry  PTA IADL's, Pt uses SPC for ambulation  Pt is retired and drives  No hx of HHC, IP rehab or MH diagnosis  Pt's home pharmacy is [facility] in [place]  No reported POA, Pt's wife works

## 2018-02-01 NOTE — MALNUTRITION/BMI
This medical record reflects one or more clinical indicators suggestive of malnutrition     Evidenced by <75% energy intake compared to estimated energy for >1 mo, unintentional wt loss of 9% x 2 mo  Malnutrition type: Chronic illness     Degree of Malnutrition: Other severe protein calorie malnutrition    BMI Findings: Body mass index is 19 04 kg/m²  See Nutrition note dated 01/31/2018 for additional details  Completed nutrition assessment is viewable in the nutrition documentation

## 2018-02-01 NOTE — RESPIRATORY THERAPY NOTE
RT Protocol Note  Linette Vines 71 y o  male MRN: [id number]  Unit/Bed#: -01 Encounter: [id number]    Assessment    Principal Problem:    Abnormal CT of the abdomen  Active Problems:    Chronic obstructive pulmonary disease (HCC)    Fatigue    Jaundice    Abnormal LFTs    Weight loss, non-intentional      Home Pulmonary Medications:  albuteral mdi prn    Past Medical History:   Diagnosis Date    COPD (chronic obstructive pulmonary disease) (HCC)     DJD (degenerative joint disease)      Social History     Social History    Marital status: Single     Spouse name: N/A    Number of children: N/A    Years of education: N/A     Social History Main Topics    Smoking status: Former Smoker    Smokeless tobacco: Never Used    Alcohol use No    Drug use: No    Sexual activity: Not Asked     Other Topics Concern    None     Social History Narrative    None       Subjective    Subjective Data: He denies sob/distress  States he rarely has to use his prn mdi  Objective    Physical Exam:   Assessment Type: Assess only  General Appearance: Alert, Awake  Respiratory Pattern: Normal  Chest Assessment: Chest expansion symmetrical, Trachea midline  Bilateral Breath Sounds: Clear  R Breath Sounds: Clear  L Breath Sounds: Clear  Cough: None    Vitals:  Blood pressure 158/85, pulse 75, temperature 98 2 °F (36 8 °C), temperature source Axillary, resp  rate 18, height 5' 8" (1 727 m), weight 59 1 kg (130 lb 6 4 oz), SpO2 100 %  Imaging and other studies: I have personally reviewed pertinent reports  Plan    Respiratory Plan: Discontinue Protocol        Resp Comments: Pt admitted for abnormal CT scan  Pt currently appears comfortable on RMA  He does have a hx of COPD for which he uses albuteral prn @ home  Will continue with physician ordered albuteral mdi prn while inpatient

## 2018-02-01 NOTE — ED ATTENDING ATTESTATION
Moisés Osborne DO, saw and evaluated the patient  I have discussed the patient with the resident/non-physician practitioner and agree with the resident's/non-physician practitioner's findings, Plan of Care, and MDM as documented in the resident's/non-physician practitioner's note, except where noted  All available labs and Radiology studies were reviewed  At this point I agree with the current assessment done in the Emergency Department  I have conducted an independent evaluation of this patient a history and physical is as follows:      72 yo male presents for evaluation of relatively painless jaundice  Has had significant weight loss over last few months, jaundice  Intermittent abd pains  Pt had outpt CT scan and labs demonstrating hyperbilirubinemia due to extensive intraabdominal tumor mass causing IHBD obstruction  Pt offers no other c/o at this time  Imp: intraabdominal CA likely colon with mets  Hyperbilirubinemia due to #1 plan: labs, admit for further eval  Pt was sent in by oncology/GI        Critical Care Time  CritCare Time    Procedures

## 2018-02-01 NOTE — ASSESSMENT & PLAN NOTE
Extensive infiltrating tumor with high grade intra hepatic biliary obstruction and left hydro  GI consultation for ?  ERCP  Urology consultation for hydro  Tumor markers pending  Oncology on board

## 2018-02-02 ENCOUNTER — APPOINTMENT (INPATIENT)
Dept: RADIOLOGY | Facility: HOSPITAL | Age: 73
DRG: 435 | End: 2018-02-02
Attending: INTERNAL MEDICINE
Payer: COMMERCIAL

## 2018-02-02 LAB
ALBUMIN SERPL BCP-MCNC: 1.9 G/DL (ref 3.5–5)
ALP SERPL-CCNC: 436 U/L (ref 46–116)
ALT SERPL W P-5'-P-CCNC: 98 U/L (ref 12–78)
ANION GAP SERPL CALCULATED.3IONS-SCNC: 9 MMOL/L (ref 4–13)
AST SERPL W P-5'-P-CCNC: 85 U/L (ref 5–45)
BASOPHILS # BLD AUTO: 0.13 THOUSANDS/ΜL (ref 0–0.1)
BASOPHILS NFR BLD AUTO: 1 % (ref 0–1)
BILIRUB SERPL-MCNC: 14.9 MG/DL (ref 0.2–1)
BUN SERPL-MCNC: 21 MG/DL (ref 5–25)
CALCIUM SERPL-MCNC: 8.5 MG/DL (ref 8.3–10.1)
CANCER AG19-9 SERPL-ACNC: 7317 U/ML (ref 0–35)
CHLORIDE SERPL-SCNC: 104 MMOL/L (ref 100–108)
CO2 SERPL-SCNC: 23 MMOL/L (ref 21–32)
CREAT SERPL-MCNC: 1.31 MG/DL (ref 0.6–1.3)
EOSINOPHIL # BLD AUTO: 0.08 THOUSAND/ΜL (ref 0–0.61)
EOSINOPHIL NFR BLD AUTO: 1 % (ref 0–6)
ERYTHROCYTE [DISTWIDTH] IN BLOOD BY AUTOMATED COUNT: 19.8 % (ref 11.6–15.1)
GFR SERPL CREATININE-BSD FRML MDRD: 54 ML/MIN/1.73SQ M
GLUCOSE SERPL-MCNC: 100 MG/DL (ref 65–140)
HCT VFR BLD AUTO: 28.8 % (ref 36.5–49.3)
HGB BLD-MCNC: 10.1 G/DL (ref 12–17)
INR PPP: 1.52 (ref 0.86–1.16)
LYMPHOCYTES # BLD AUTO: 1.71 THOUSANDS/ΜL (ref 0.6–4.47)
LYMPHOCYTES NFR BLD AUTO: 12 % (ref 14–44)
MCH RBC QN AUTO: 29.6 PG (ref 26.8–34.3)
MCHC RBC AUTO-ENTMCNC: 35.1 G/DL (ref 31.4–37.4)
MCV RBC AUTO: 85 FL (ref 82–98)
MONOCYTES # BLD AUTO: 1.35 THOUSAND/ΜL (ref 0.17–1.22)
MONOCYTES NFR BLD AUTO: 10 % (ref 4–12)
NEUTROPHILS # BLD AUTO: 10.55 THOUSANDS/ΜL (ref 1.85–7.62)
NEUTS SEG NFR BLD AUTO: 76 % (ref 43–75)
NRBC BLD AUTO-RTO: 0 /100 WBCS
PLATELET # BLD AUTO: 313 THOUSANDS/UL (ref 149–390)
PMV BLD AUTO: 10.7 FL (ref 8.9–12.7)
POTASSIUM SERPL-SCNC: 3.6 MMOL/L (ref 3.5–5.3)
PROT SERPL-MCNC: 7.2 G/DL (ref 6.4–8.2)
PROTHROMBIN TIME: 18.4 SECONDS (ref 12.1–14.4)
RBC # BLD AUTO: 3.41 MILLION/UL (ref 3.88–5.62)
SODIUM SERPL-SCNC: 136 MMOL/L (ref 136–145)
WBC # BLD AUTO: 13.93 THOUSAND/UL (ref 4.31–10.16)

## 2018-02-02 PROCEDURE — 30233K1 TRANSFUSION OF NONAUTOLOGOUS FROZEN PLASMA INTO PERIPHERAL VEIN, PERCUTANEOUS APPROACH: ICD-10-PCS | Performed by: GENERAL PRACTICE

## 2018-02-02 PROCEDURE — 88342 IMHCHEM/IMCYTCHM 1ST ANTB: CPT | Performed by: PATHOLOGY

## 2018-02-02 PROCEDURE — 99152 MOD SED SAME PHYS/QHP 5/>YRS: CPT | Performed by: RADIOLOGY

## 2018-02-02 PROCEDURE — 77012 CT SCAN FOR NEEDLE BIOPSY: CPT

## 2018-02-02 PROCEDURE — 80053 COMPREHEN METABOLIC PANEL: CPT | Performed by: PHYSICIAN ASSISTANT

## 2018-02-02 PROCEDURE — 99152 MOD SED SAME PHYS/QHP 5/>YRS: CPT

## 2018-02-02 PROCEDURE — 99232 SBSQ HOSP IP/OBS MODERATE 35: CPT | Performed by: INTERNAL MEDICINE

## 2018-02-02 PROCEDURE — 88341 IMHCHEM/IMCYTCHM EA ADD ANTB: CPT | Performed by: PATHOLOGY

## 2018-02-02 PROCEDURE — 30233N1 TRANSFUSION OF NONAUTOLOGOUS RED BLOOD CELLS INTO PERIPHERAL VEIN, PERCUTANEOUS APPROACH: ICD-10-PCS | Performed by: GENERAL PRACTICE

## 2018-02-02 PROCEDURE — 88341 IMHCHEM/IMCYTCHM EA ADD ANTB: CPT | Performed by: INTERNAL MEDICINE

## 2018-02-02 PROCEDURE — 99153 MOD SED SAME PHYS/QHP EA: CPT

## 2018-02-02 PROCEDURE — 88307 TISSUE EXAM BY PATHOLOGIST: CPT | Performed by: PATHOLOGY

## 2018-02-02 PROCEDURE — 77012 CT SCAN FOR NEEDLE BIOPSY: CPT | Performed by: RADIOLOGY

## 2018-02-02 PROCEDURE — 88307 TISSUE EXAM BY PATHOLOGIST: CPT | Performed by: INTERNAL MEDICINE

## 2018-02-02 PROCEDURE — 49180 BIOPSY ABDOMINAL MASS: CPT

## 2018-02-02 PROCEDURE — 85610 PROTHROMBIN TIME: CPT | Performed by: RADIOLOGY

## 2018-02-02 PROCEDURE — 88333 PATH CONSLTJ SURG CYTO XM 1: CPT | Performed by: PATHOLOGY

## 2018-02-02 PROCEDURE — 0FB13ZX EXCISION OF RIGHT LOBE LIVER, PERCUTANEOUS APPROACH, DIAGNOSTIC: ICD-10-PCS | Performed by: RADIOLOGY

## 2018-02-02 PROCEDURE — 88333 PATH CONSLTJ SURG CYTO XM 1: CPT | Performed by: INTERNAL MEDICINE

## 2018-02-02 PROCEDURE — 99232 SBSQ HOSP IP/OBS MODERATE 35: CPT | Performed by: HOSPITALIST

## 2018-02-02 PROCEDURE — 49180 BIOPSY ABDOMINAL MASS: CPT | Performed by: RADIOLOGY

## 2018-02-02 PROCEDURE — 85025 COMPLETE CBC W/AUTO DIFF WBC: CPT | Performed by: PHYSICIAN ASSISTANT

## 2018-02-02 PROCEDURE — 88342 IMHCHEM/IMCYTCHM 1ST ANTB: CPT | Performed by: INTERNAL MEDICINE

## 2018-02-02 RX ORDER — FENTANYL CITRATE 50 UG/ML
INJECTION, SOLUTION INTRAMUSCULAR; INTRAVENOUS CODE/TRAUMA/SEDATION MEDICATION
Status: COMPLETED | OUTPATIENT
Start: 2018-02-02 | End: 2018-02-02

## 2018-02-02 RX ORDER — MIDAZOLAM HYDROCHLORIDE 1 MG/ML
INJECTION INTRAMUSCULAR; INTRAVENOUS CODE/TRAUMA/SEDATION MEDICATION
Status: COMPLETED | OUTPATIENT
Start: 2018-02-02 | End: 2018-02-02

## 2018-02-02 RX ORDER — SODIUM CHLORIDE 9 MG/ML
75 INJECTION, SOLUTION INTRAVENOUS CONTINUOUS
Status: DISCONTINUED | OUTPATIENT
Start: 2018-02-02 | End: 2018-02-07

## 2018-02-02 RX ADMIN — FENTANYL CITRATE 50 MCG: 50 INJECTION, SOLUTION INTRAMUSCULAR; INTRAVENOUS at 10:15

## 2018-02-02 RX ADMIN — SODIUM CHLORIDE 100 ML/HR: 0.9 INJECTION, SOLUTION INTRAVENOUS at 12:44

## 2018-02-02 RX ADMIN — FENTANYL CITRATE 50 MCG: 50 INJECTION, SOLUTION INTRAMUSCULAR; INTRAVENOUS at 10:04

## 2018-02-02 RX ADMIN — FENTANYL CITRATE 50 MCG: 50 INJECTION, SOLUTION INTRAMUSCULAR; INTRAVENOUS at 10:10

## 2018-02-02 RX ADMIN — FENTANYL CITRATE 50 MCG: 50 INJECTION, SOLUTION INTRAMUSCULAR; INTRAVENOUS at 09:57

## 2018-02-02 RX ADMIN — MIDAZOLAM 1 MG: 1 INJECTION INTRAMUSCULAR; INTRAVENOUS at 09:57

## 2018-02-02 RX ADMIN — ENOXAPARIN SODIUM 40 MG: 40 INJECTION SUBCUTANEOUS at 12:43

## 2018-02-02 RX ADMIN — DOCUSATE SODIUM 100 MG: 100 CAPSULE, LIQUID FILLED ORAL at 12:42

## 2018-02-02 RX ADMIN — MIDAZOLAM 1 MG: 1 INJECTION INTRAMUSCULAR; INTRAVENOUS at 10:04

## 2018-02-02 RX ADMIN — POLYETHYLENE GLYCOL 3350 17 G: 17 POWDER, FOR SOLUTION ORAL at 12:43

## 2018-02-02 RX ADMIN — SENNOSIDES 8.6 MG: 8.6 TABLET, FILM COATED ORAL at 12:43

## 2018-02-02 RX ADMIN — DOCUSATE SODIUM 100 MG: 100 CAPSULE, LIQUID FILLED ORAL at 18:27

## 2018-02-02 NOTE — PROGRESS NOTES
Progress Note - Marily Lei 71 y o  male MRN: [id number]    Unit/Bed#: -01 Encounter: [id number]      ASSESSMENT/ PLAN:    70 yo male pmh COPD being evaluated for painless jaundice and elevated LFTs     1  Weight loss with abnormal CT:  CT revealed 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 site with involvement of upper abdominal organs including the tail of the pancreas, omentum, peritoneal margin, left para-aortic nodes and long segment of sigmoid colon  CEA 4 1,  elevated to 7317    -s/p bx of abdominal mass via IR  -outpatient follow up with oncology      2  Painless Jaundice with elevated LFTs: secondary to #1 and tumor involvement of the otto hepatis resulting in high-grade intrahepatic biliary obstruction as well as encasement of the hepatic artery and occlusion of the main portal vein  His LFTs remain elevated with a tbili of 14 9  He will require PTC drainage with IR, which will most likely be done early next week  -regular diet   -monitor LFTs  -plan for PTC drain next week     Subjective:     Patient seen and examined     Objective:     Vitals: Blood pressure 124/61, pulse 64, temperature 98 °F (36 7 °C), temperature source Oral, resp  rate 20, height 5' 8" (1 727 m), weight 56 8 kg (125 lb 3 5 oz), SpO2 99 %  ,Body mass index is 19 04 kg/m²        Intake/Output Summary (Last 24 hours) at 02/02/18 1252  Last data filed at 02/02/18 1227   Gross per 24 hour   Intake             1530 ml   Output              300 ml   Net             1230 ml       Physical Exam:     General Appearance: A&Ox3, appears stated age and cooperative, jaundiced  Lungs: Clear to auscultation bilaterally, no rales or rhonchi  Heart: Regular rate and rhythm, S1, S2 normal, no murmur, click, rub or gallop  Abdomen: Soft, diffusely tender, distended; bowel sounds normal; no masses or no organomegaly  Extremities: No cyanosis, clubbing, edema    Invasive Devices     Peripheral Intravenous Line Peripheral IV 01/31/18 Left Antecubital 1 day                Lab Results:      Results from last 7 days  Lab Units 02/02/18  0900   WBC Thousand/uL 13 93*   HEMOGLOBIN g/dL 10 1*   HEMATOCRIT % 28 8*   PLATELETS Thousands/uL 313   NEUTROS PCT % 76*   LYMPHS PCT % 12*   MONOS PCT % 10   EOS PCT % 1       Results from last 7 days  Lab Units 02/02/18  0900   SODIUM mmol/L 136   POTASSIUM mmol/L 3 6   CHLORIDE mmol/L 104   CO2 mmol/L 23   BUN mg/dL 21   CREATININE mg/dL 1 31*   CALCIUM mg/dL 8 5   TOTAL PROTEIN g/dL 7 2   BILIRUBIN TOTAL mg/dL 14 90*   ALK PHOS U/L 436*   ALT U/L 98*   AST U/L 85*   GLUCOSE RANDOM mg/dL 100       Results from last 7 days  Lab Units 02/02/18  0900   INR  1 52*           Imaging Studies: I have personally reviewed pertinent imaging studies  No results found

## 2018-02-02 NOTE — PROGRESS NOTES
Progress Note - Echo Almonte 1945, 71 y o  male MRN: [id number]    Unit/Bed#: -Bing Encounter: [id number]    Primary Care Provider: Ame Taylor DO   Date and time admitted to hospital: 1/31/2018  7:35 PM        Weight loss, non-intentional   Assessment & Plan    Secondary to above( decreased oral intake with evidence of malignancy unknown primary origin)  Nutritionist consult appreciated, ensure clear tid ordered        Fatigue   Assessment & Plan    Secondary underlying malignancy        Chronic obstructive pulmonary disease (HCC)   Assessment & Plan    Continue albuterol   Respiratory protocol        * Abnormal CT of the abdomen   Assessment & Plan    Extensive infiltrating tumor with high grade intra hepatic biliary obstruction and left hydro  S/p bx, CA 19-9 in 7000s  For perc biliary drainage on Monday along with left nephrotomy tube placement  Oncology, GI and IR on board        Abnormal LFTs   Assessment & Plan    As above  No evidence of cholangitis        Jaundice   Assessment & Plan    Painless obstructive jaundice   for perc biliary drain on Monday with IR        UTI (urinary tract infection)   Assessment & Plan    Abnormal UA, leukocytosis and dysuria  on rocephin  Await urine cx results          VTE Pharmacologic Prophylaxis:   Pharmacologic: Enoxaparin (Lovenox)  Mechanical VTE Prophylaxis in Place: Yes    Patient Centered Rounds: I have performed bedside rounds with nursing staff today  Education and Discussions with Family / Patient: patient and wife    Time Spent for Care: 30 minutes  More than 50% of total time spent on counseling and coordination of care as described above      Current Length of Stay: 2 day(s)    Current Patient Status: Inpatient   Certification Statement: The patient will continue to require additional inpatient hospital stay due to perc biliary drain and left nephrostomy tube      Code Status: Level 1 - Full Code      Subjective:   No acute events overnight  No complaints  No fevers, chills, nausea, emesis    Objective:     Vitals:   Temp (24hrs), Av 3 °F (36 8 °C), Min:98 °F (36 7 °C), Max:98 6 °F (37 °C)    HR:  [61-74] 64  Resp:  [18-20] 20  BP: (124-166)/(61-77) 124/61  SpO2:  [97 %-100 %] 99 %  Body mass index is 19 04 kg/m²  Input and Output Summary (last 24 hours): Intake/Output Summary (Last 24 hours) at 18 1233  Last data filed at 18 1227   Gross per 24 hour   Intake             1530 ml   Output              300 ml   Net             1230 ml       Physical Exam:     Physical Exam  No distress, wife at bedside  Jaundiced  Dry MM  RRR  Clear b/l  Soft, +bs, nontender  No edema  Oriented x 3  No rash  Calm and cooperative  No joint pain or swelling    Additional Data:     Labs:      Results from last 7 days  Lab Units 18  0900   WBC Thousand/uL 13 93*   HEMOGLOBIN g/dL 10 1*   HEMATOCRIT % 28 8*   PLATELETS Thousands/uL 313   NEUTROS PCT % 76*   LYMPHS PCT % 12*   MONOS PCT % 10   EOS PCT % 1       Results from last 7 days  Lab Units 18  0900   SODIUM mmol/L 136   POTASSIUM mmol/L 3 6   CHLORIDE mmol/L 104   CO2 mmol/L 23   BUN mg/dL 21   CREATININE mg/dL 1 31*   CALCIUM mg/dL 8 5   TOTAL PROTEIN g/dL 7 2   BILIRUBIN TOTAL mg/dL 14 90*   ALK PHOS U/L 436*   ALT U/L 98*   AST U/L 85*   GLUCOSE RANDOM mg/dL 100       Results from last 7 days  Lab Units 18  0900   INR  1 52*       * I Have Reviewed All Lab Data Listed Above  * Additional Pertinent Lab Tests Reviewed:  All Labs Within Last 24 Hours Reviewed      Recent Cultures (last 7 days):           Last 24 Hours Medication List:     Current Facility-Administered Medications:  acetaminophen 650 mg Oral Q6H PRN Joanie Borden MD   albuterol 2 puff Inhalation Q4H PRN Joanie Borden MD   docusate sodium 100 mg Oral BID Joanie Borden MD   enoxaparin 40 mg Subcutaneous Daily Joanie Borden MD   HYDROmorphone 0 5 mg Intravenous Q3H PRN Joanie Borden MD   morphine injection 4 mg Intravenous Once Caterina Hoyos DO   ondansetron 4 mg Intravenous Q6H PRN Nicky Soni MD   oxyCODONE 5 mg Oral Q4H PRN Nicky Soni MD   polyethylene glycol 17 g Oral Daily Nicky Soni MD   senna 1 tablet Oral Daily Nicky Soni MD   sodium chloride 100 mL/hr Intravenous Continuous Lyric Pichardo MD        Today, Patient Was Seen By: Lyric Pichardo MD    ** Please Note: Dictation voice to text software may have been used in the creation of this document   **

## 2018-02-02 NOTE — PROGRESS NOTES
23-year-old male presenting with abdominal mass abutting the inferior aspect of the right lobe of his liver, for biopsy  Biliary drainage and left nephrostomy will be performed under anesthesia when anesthesia becomes available  Thus far, anesthesia is not available today, so will try for Monday  The history and physical were reviewed, along with progress notes, and the patient was examined  There are no changes since it was written

## 2018-02-02 NOTE — ASSESSMENT & PLAN NOTE
Extensive infiltrating tumor with high grade intra hepatic biliary obstruction and left hydro  S/p bx, CA 19-9 in 7000s  For perc biliary drainage on Monday along with left nephrotomy tube placement  Oncology, GI and IR on board

## 2018-02-02 NOTE — BRIEF OP NOTE (RAD/CATH)
INTERVENTIONAL RADIOLOGY PROCEDURE NOTE    Preoperative diagnosis:  Abdominal mass    Postoperative diagnosis: Same    Procedure:  Percutaneous biopsy of abdominal mass adjacent to right lobe of the liver    Surgeon: Lise Bone MD     Assistant: None  No qualified resident was available      Blood loss:  1 mL    Specimens:  Five 18 gauge cores    Findings:  Adequate specimen    Complications:  None immediate    Anesthesia: IV conscious sedation

## 2018-02-02 NOTE — PROGRESS NOTES
Reviewed request for biliary decompression, percutaneous biopsy of liver mass, and left-sided nephrostomy tube placement in a patient with newly identified multiple abdominal masses and painless obstructive jaundice  Per previous discussion between Dr Taylor Forman and Dr Melba Mauro, retrograde biliary access may be difficult due to high obstruction and possible isolated ducts  Patient may need multiple antegrade biliary tubes placed  Patient with decreasing but elevated WBC and elevated LFTs without overt cholangitis  Additionally, patient with indeterminate chronicity left-sided hydronephrosis and delayed nephrogram   Creatinine remains intact and patient is spontaneously voiding  We will perform ultrasound/CT guided percutaneous biopsy of a mass abutting the inferior right hepatic lobe as seen on CT today  We are awaiting anesthesia availability for PTC/PTBD and left nephrostomy tube placement, with possibility of procedures occurring today versus Monday/Tuesday  If the patient becomes acutely  cholangitic/septic, arrangements will be made for emergent decompression

## 2018-02-02 NOTE — ASSESSMENT & PLAN NOTE
Secondary to above( decreased oral intake with evidence of malignancy unknown primary origin)  Nutritionist consult appreciated, ensure clear tid ordered

## 2018-02-03 PROBLEM — R16.0 LIVER MASS: Status: ACTIVE | Noted: 2018-02-03

## 2018-02-03 PROBLEM — N13.39 OTHER HYDRONEPHROSIS: Status: ACTIVE | Noted: 2018-02-03

## 2018-02-03 LAB
ALBUMIN SERPL BCP-MCNC: 1.7 G/DL (ref 3.5–5)
ALP SERPL-CCNC: 418 U/L (ref 46–116)
ALT SERPL W P-5'-P-CCNC: 84 U/L (ref 12–78)
ANION GAP SERPL CALCULATED.3IONS-SCNC: 9 MMOL/L (ref 4–13)
ANISOCYTOSIS BLD QL SMEAR: PRESENT
AST SERPL W P-5'-P-CCNC: 74 U/L (ref 5–45)
BASOPHILS # BLD MANUAL: 0.27 THOUSAND/UL (ref 0–0.1)
BASOPHILS NFR MAR MANUAL: 2 % (ref 0–1)
BILIRUB SERPL-MCNC: 13.6 MG/DL (ref 0.2–1)
BUN SERPL-MCNC: 21 MG/DL (ref 5–25)
CALCIUM SERPL-MCNC: 8.3 MG/DL (ref 8.3–10.1)
CHLORIDE SERPL-SCNC: 107 MMOL/L (ref 100–108)
CO2 SERPL-SCNC: 22 MMOL/L (ref 21–32)
CREAT SERPL-MCNC: 1.16 MG/DL (ref 0.6–1.3)
EOSINOPHIL # BLD MANUAL: 0 THOUSAND/UL (ref 0–0.4)
EOSINOPHIL NFR BLD MANUAL: 0 % (ref 0–6)
ERYTHROCYTE [DISTWIDTH] IN BLOOD BY AUTOMATED COUNT: 19.8 % (ref 11.6–15.1)
GFR SERPL CREATININE-BSD FRML MDRD: 63 ML/MIN/1.73SQ M
GLUCOSE SERPL-MCNC: 94 MG/DL (ref 65–140)
HCT VFR BLD AUTO: 27.9 % (ref 36.5–49.3)
HGB BLD-MCNC: 9.6 G/DL (ref 12–17)
HYPERCHROMIA BLD QL SMEAR: PRESENT
LYMPHOCYTES # BLD AUTO: 0.53 THOUSAND/UL (ref 0.6–4.47)
LYMPHOCYTES # BLD AUTO: 4 % (ref 14–44)
MACROCYTES BLD QL AUTO: PRESENT
MCH RBC QN AUTO: 29.6 PG (ref 26.8–34.3)
MCHC RBC AUTO-ENTMCNC: 34.4 G/DL (ref 31.4–37.4)
MCV RBC AUTO: 86 FL (ref 82–98)
MONOCYTES # BLD AUTO: 0.94 THOUSAND/UL (ref 0–1.22)
MONOCYTES NFR BLD: 7 % (ref 4–12)
MYELOCYTES NFR BLD MANUAL: 1 % (ref 0–1)
NEUTROPHILS # BLD MANUAL: 11.49 THOUSAND/UL (ref 1.85–7.62)
NEUTS SEG NFR BLD AUTO: 86 % (ref 43–75)
NRBC BLD AUTO-RTO: 0 /100 WBCS
PLATELET # BLD AUTO: 266 THOUSANDS/UL (ref 149–390)
PLATELET BLD QL SMEAR: ADEQUATE
PMV BLD AUTO: 11.3 FL (ref 8.9–12.7)
POTASSIUM SERPL-SCNC: 3.5 MMOL/L (ref 3.5–5.3)
PROT SERPL-MCNC: 6.6 G/DL (ref 6.4–8.2)
RBC # BLD AUTO: 3.24 MILLION/UL (ref 3.88–5.62)
RBC MORPH BLD: PRESENT
SODIUM SERPL-SCNC: 138 MMOL/L (ref 136–145)
TARGETS BLD QL SMEAR: PRESENT
WBC # BLD AUTO: 13.36 THOUSAND/UL (ref 4.31–10.16)

## 2018-02-03 PROCEDURE — 80053 COMPREHEN METABOLIC PANEL: CPT | Performed by: HOSPITALIST

## 2018-02-03 PROCEDURE — 85027 COMPLETE CBC AUTOMATED: CPT | Performed by: HOSPITALIST

## 2018-02-03 PROCEDURE — 85007 BL SMEAR W/DIFF WBC COUNT: CPT | Performed by: HOSPITALIST

## 2018-02-03 PROCEDURE — 99233 SBSQ HOSP IP/OBS HIGH 50: CPT | Performed by: INTERNAL MEDICINE

## 2018-02-03 RX ADMIN — SODIUM CHLORIDE 100 ML/HR: 0.9 INJECTION, SOLUTION INTRAVENOUS at 11:10

## 2018-02-03 RX ADMIN — SENNOSIDES 8.6 MG: 8.6 TABLET, FILM COATED ORAL at 08:27

## 2018-02-03 RX ADMIN — ENOXAPARIN SODIUM 40 MG: 40 INJECTION SUBCUTANEOUS at 08:27

## 2018-02-03 RX ADMIN — SODIUM CHLORIDE 100 ML/HR: 0.9 INJECTION, SOLUTION INTRAVENOUS at 21:33

## 2018-02-03 RX ADMIN — DOCUSATE SODIUM 100 MG: 100 CAPSULE, LIQUID FILLED ORAL at 08:27

## 2018-02-03 RX ADMIN — SODIUM CHLORIDE 100 ML/HR: 0.9 INJECTION, SOLUTION INTRAVENOUS at 01:24

## 2018-02-03 RX ADMIN — POLYETHYLENE GLYCOL 3350 17 G: 17 POWDER, FOR SOLUTION ORAL at 08:27

## 2018-02-03 RX ADMIN — DOCUSATE SODIUM 100 MG: 100 CAPSULE, LIQUID FILLED ORAL at 17:10

## 2018-02-03 NOTE — PROGRESS NOTES
Progress Note - [place] Schirmer 1945, 71 y o  male MRN: [id number]    Unit/Bed#: MS [facility]-Bing Encounter: [id number]    Primary Care Provider: Quincy Sommers DO   Date and time admitted to hospital: 2018  7:35 PM        Liver mass   Assessment & Plan    Diffuse infiltrative s/p Bx yesterday - results pending          Jaundice   Assessment & Plan    Monitor bilirubin  Probably both obstructive and hepatic  Plan for perc biliary drain Monday by IR  IVF in meantime        Other hydronephrosis   Assessment & Plan    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  Will probably need perc Nephrostomy tube placement Lt side by IR on Monday  Urology review noted  Weight loss, non-intentional   Assessment & Plan    R/o malignancy as cause        Chronic low back pain   Assessment & Plan    2" known DJD spine per previous imaging  Continue prn medications for pain  VTE Pharmacologic Prophylaxis: Enoxaparin (Lovenox)  Mechanical: Mechanical VTE prophylaxis in place  Patient Centered Rounds: I have performed bedside rounds with nursing staff today  Discussions with Specialists or Other Care Team Provider:     Education and Discussions with Family / Patient: wife updated over phone, discussed CT findings and anticipated plan of care    Time Spent for Care: 35min  More than 50% of total time spent on counseling and coordination of care as described above  Current Length of Stay: 3 day(s)    Current Patient Status: Inpatient   Certification Statement: The patient will continue to require additional inpatient hospital stay due to all above    Discharge Plan:    Code Status: Level 1 - Full Code      Subjective: upper back pain reported lt side, sharp and lasting seconds only    Objective:     Vitals:   Temp (24hrs), Av °F (36 7 °C), Min:97 8 °F (36 6 °C), Max:98 3 °F (36 8 °C)    HR:  [63-74] 69  Resp:  [18-20] 20  BP: (136-157)/(70-85) 136/85  SpO2:  [97 %-99 %] 99 %  Body mass index is 19 04 kg/m²  Input and Output Summary (last 24 hours): Intake/Output Summary (Last 24 hours) at 02/03/18 1524  Last data filed at 02/03/18 1346   Gross per 24 hour   Intake          2999 67 ml   Output              850 ml   Net          2149 67 ml       Physical Exam   Constitutional: He is oriented to person, place, and time  cachectic   HENT:   Head: Normocephalic  Eyes: Pupils are equal, round, and reactive to light  Cardiovascular: Normal heart sounds  Pulmonary/Chest: Effort normal    Abdominal: He exhibits distension  Neurological: He is alert and oriented to person, place, and time  Skin:   icteric         Additional Data:     Labs:      Results from last 7 days  Lab Units 02/03/18  0455 02/02/18  0900   WBC Thousand/uL 13 36* 13 93*   HEMOGLOBIN g/dL 9 6* 10 1*   HEMATOCRIT % 27 9* 28 8*   PLATELETS Thousands/uL 266 313   NEUTROS PCT %  --  76*   LYMPHS PCT %  --  12*   LYMPHO PCT % 4*  --    MONOS PCT %  --  10   MONO PCT MAN % 7  --    EOS PCT %  --  1   EOSINO PCT MANUAL % 0  --        Results from last 7 days  Lab Units 02/03/18  0455   SODIUM mmol/L 138   POTASSIUM mmol/L 3 5   CHLORIDE mmol/L 107   CO2 mmol/L 22   BUN mg/dL 21   CREATININE mg/dL 1 16   CALCIUM mg/dL 8 3   TOTAL PROTEIN g/dL 6 6   BILIRUBIN TOTAL mg/dL 13 60*   ALK PHOS U/L 418*   ALT U/L 84*   AST U/L 74*   GLUCOSE RANDOM mg/dL 94       Results from last 7 days  Lab Units 02/02/18  0900   INR  1 52*       * I Have Reviewed All Lab Data Listed Above      Imaging:  Imaging Personally Reviewed by Myself Includes:     Cultures:   Blood Culture: No results found for: BLOODCX  Urine Culture: No results found for: URINECX  Sputum Culture: No components found for: SPUTUMCX  Wound Culture: No results found for: WOUNDCULT    Last 24 Hours Medication List:     Current Facility-Administered Medications:  acetaminophen 650 mg Oral Q6H PRN Po Gan MD    albuterol 2 puff Inhalation Q4H PRN Po Gan MD    docusate sodium 100 mg Oral BID Queen Marin MD    enoxaparin 40 mg Subcutaneous Daily Queen Marin MD    HYDROmorphone 0 5 mg Intravenous Q3H PRN Queen Marin MD    morphine injection 4 mg Intravenous Once Eather Grumet, DO    ondansetron 4 mg Intravenous Q6H PRN Queen Marin MD    oxyCODONE 5 mg Oral Q4H PRN Queen Marin MD    polyethylene glycol 17 g Oral Daily Queen Marin MD    senna 1 tablet Oral Daily Queen Marin MD    sodium chloride 100 mL/hr Intravenous Continuous Lelia Field MD Last Rate: 100 mL/hr (02/03/18 1110)        Today, Patient Was Seen By: Addison Younger MD    ** Please Note: Dragon 360 Dictation voice to text software may have been used in the creation of this document   **

## 2018-02-03 NOTE — ASSESSMENT & PLAN NOTE
Monitor bilirubin  Probably both obstructive and hepatic  Plan for perc biliary drain Monday by IR  IVF in meantime

## 2018-02-03 NOTE — ASSESSMENT & PLAN NOTE
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

## 2018-02-04 PROCEDURE — 99232 SBSQ HOSP IP/OBS MODERATE 35: CPT | Performed by: INTERNAL MEDICINE

## 2018-02-04 RX ADMIN — ENOXAPARIN SODIUM 40 MG: 40 INJECTION SUBCUTANEOUS at 08:22

## 2018-02-04 RX ADMIN — SODIUM CHLORIDE 75 ML/HR: 0.9 INJECTION, SOLUTION INTRAVENOUS at 20:42

## 2018-02-04 RX ADMIN — POLYETHYLENE GLYCOL 3350 17 G: 17 POWDER, FOR SOLUTION ORAL at 08:22

## 2018-02-04 RX ADMIN — SODIUM CHLORIDE 100 ML/HR: 0.9 INJECTION, SOLUTION INTRAVENOUS at 08:17

## 2018-02-04 RX ADMIN — DOCUSATE SODIUM 100 MG: 100 CAPSULE, LIQUID FILLED ORAL at 08:22

## 2018-02-04 RX ADMIN — SENNOSIDES 8.6 MG: 8.6 TABLET, FILM COATED ORAL at 08:22

## 2018-02-04 NOTE — ASSESSMENT & PLAN NOTE
Repeat CMP in AM  Probably both obstructive and hepatic  Plan for perc biliary drain Monday by IR  Await GI input in AM  IVF in meantime

## 2018-02-04 NOTE — PROGRESS NOTES
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  Will probably need perc Nephrostomy tube placement Lt side by IR on Monday  Urology review noted  Fatigue   Assessment & Plan    Probably jaundice related  Improved with hydration        Chronic low back pain   Assessment & Plan    2" known DJD spine per previous imaging  Continue prn medications for pain  VTE Pharmacologic Prophylaxis: Enoxaparin (Lovenox)  Mechanical: Mechanical VTE prophylaxis in place  Patient Centered Rounds: I have performed bedside rounds with nursing staff today  Discussions with Specialists or Other Care Team Provider:     Education and Discussions with Family / Patient: updated wife over phone    Time Spent for Care: More than 50% of total time spent on counseling and coordination of care as described above  Current Length of Stay: 4 day(s)    Current Patient Status: Inpatient   Certification Statement: The patient will continue to require additional inpatient hospital stay due to as above    Discharge Plan:    Code Status: Level 1 - Full Code      Subjective: no new complaints; feels less fatigue    Objective:     Vitals:   Temp (24hrs), Av 3 °F (36 8 °C), Min:98 °F (36 7 °C), Max:98 8 °F (37 1 °C)    HR:  [68-69] 69  Resp:  [20] 20  BP: (136-174)/(83-85) 174/84  SpO2:  [98 %-99 %] 98 %  Body mass index is 19 04 kg/m²       Input and Output Summary (last 24 hours): Intake/Output Summary (Last 24 hours) at 02/04/18 1507  Last data filed at 02/04/18 1118   Gross per 24 hour   Intake          3163 34 ml   Output              700 ml   Net          2463 34 ml       Physical Exam   Cardiovascular: Normal heart sounds  Pulmonary/Chest: Effort normal    Abdominal: Soft  Bowel sounds are normal  He exhibits distension  Skin:   jandice but better than yesterday         Additional Data:     Labs:      Results from last 7 days  Lab Units 02/03/18  0455 02/02/18  0900   WBC Thousand/uL 13 36* 13 93*   HEMOGLOBIN g/dL 9 6* 10 1*   HEMATOCRIT % 27 9* 28 8*   PLATELETS Thousands/uL 266 313   NEUTROS PCT %  --  76*   LYMPHS PCT %  --  12*   LYMPHO PCT % 4*  --    MONOS PCT %  --  10   MONO PCT MAN % 7  --    EOS PCT %  --  1   EOSINO PCT MANUAL % 0  --        Results from last 7 days  Lab Units 02/03/18  0455   SODIUM mmol/L 138   POTASSIUM mmol/L 3 5   CHLORIDE mmol/L 107   CO2 mmol/L 22   BUN mg/dL 21   CREATININE mg/dL 1 16   CALCIUM mg/dL 8 3   TOTAL PROTEIN g/dL 6 6   BILIRUBIN TOTAL mg/dL 13 60*   ALK PHOS U/L 418*   ALT U/L 84*   AST U/L 74*   GLUCOSE RANDOM mg/dL 94       Results from last 7 days  Lab Units 02/02/18  0900   INR  1 52*       * I Have Reviewed All Lab Data Listed Above      Imaging:  Imaging Personally Reviewed by Myself Includes:     Cultures:   Blood Culture: No results found for: BLOODCX  Urine Culture: No results found for: URINECX  Sputum Culture: No components found for: SPUTUMCX  Wound Culture: No results found for: WOUNDCULT    Last 24 Hours Medication List:     Current Facility-Administered Medications:  acetaminophen 650 mg Oral Q6H PRN Christine Andersen MD    albuterol 2 puff Inhalation Q4H PRN Christine Andersen MD    docusate sodium 100 mg Oral BID Christine Andersen MD    enoxaparin 40 mg Subcutaneous Daily Christine Andersen MD    HYDROmorphone 0 5 mg Intravenous Q3H PRN Christine Andersen MD    morphine injection 4 mg Intravenous Once Basil Hernández, DO    ondansetron 4 mg Intravenous Q6H PRN Milly Hutton MD    oxyCODONE 5 mg Oral Q4H PRN Milly Hutton MD    polyethylene glycol 17 g Oral Daily Milly Hutton MD    senna 1 tablet Oral Daily Milly Hutton MD    sodium chloride 75 mL/hr Intravenous Continuous Nia Adams MD Last Rate: 75 mL/hr (02/04/18 1118)        Today, Patient Was Seen By: Nia Adams MD    ** Please Note: Dragon 360 Dictation voice to text software may have been used in the creation of this document   **

## 2018-02-05 LAB
ALBUMIN SERPL BCP-MCNC: 1.5 G/DL (ref 3.5–5)
ALP SERPL-CCNC: 373 U/L (ref 46–116)
ALT SERPL W P-5'-P-CCNC: 70 U/L (ref 12–78)
ANION GAP SERPL CALCULATED.3IONS-SCNC: 10 MMOL/L (ref 4–13)
AST SERPL W P-5'-P-CCNC: 68 U/L (ref 5–45)
BASOPHILS # BLD AUTO: 0.07 THOUSANDS/ΜL (ref 0–0.1)
BASOPHILS NFR BLD AUTO: 1 % (ref 0–1)
BILIRUB SERPL-MCNC: 14 MG/DL (ref 0.2–1)
BUN SERPL-MCNC: 16 MG/DL (ref 5–25)
CALCIUM SERPL-MCNC: 8.3 MG/DL (ref 8.3–10.1)
CHLORIDE SERPL-SCNC: 111 MMOL/L (ref 100–108)
CO2 SERPL-SCNC: 19 MMOL/L (ref 21–32)
CREAT SERPL-MCNC: 1.05 MG/DL (ref 0.6–1.3)
EOSINOPHIL # BLD AUTO: 0.07 THOUSAND/ΜL (ref 0–0.61)
EOSINOPHIL NFR BLD AUTO: 1 % (ref 0–6)
ERYTHROCYTE [DISTWIDTH] IN BLOOD BY AUTOMATED COUNT: 18.8 % (ref 11.6–15.1)
GFR SERPL CREATININE-BSD FRML MDRD: 71 ML/MIN/1.73SQ M
GLUCOSE SERPL-MCNC: 95 MG/DL (ref 65–140)
HCT VFR BLD AUTO: 26.9 % (ref 36.5–49.3)
HGB BLD-MCNC: 9.1 G/DL (ref 12–17)
LYMPHOCYTES # BLD AUTO: 1.63 THOUSANDS/ΜL (ref 0.6–4.47)
LYMPHOCYTES NFR BLD AUTO: 12 % (ref 14–44)
MCH RBC QN AUTO: 30.5 PG (ref 26.8–34.3)
MCHC RBC AUTO-ENTMCNC: 33.8 G/DL (ref 31.4–37.4)
MCV RBC AUTO: 90 FL (ref 82–98)
MONOCYTES # BLD AUTO: 1.32 THOUSAND/ΜL (ref 0.17–1.22)
MONOCYTES NFR BLD AUTO: 9 % (ref 4–12)
NEUTROPHILS # BLD AUTO: 10.82 THOUSANDS/ΜL (ref 1.85–7.62)
NEUTS SEG NFR BLD AUTO: 77 % (ref 43–75)
NRBC BLD AUTO-RTO: 0 /100 WBCS
PLATELET # BLD AUTO: 259 THOUSANDS/UL (ref 149–390)
PMV BLD AUTO: 10.8 FL (ref 8.9–12.7)
POTASSIUM SERPL-SCNC: 3.4 MMOL/L (ref 3.5–5.3)
PROT SERPL-MCNC: 6.3 G/DL (ref 6.4–8.2)
RBC # BLD AUTO: 2.98 MILLION/UL (ref 3.88–5.62)
SODIUM SERPL-SCNC: 140 MMOL/L (ref 136–145)
WBC # BLD AUTO: 14.02 THOUSAND/UL (ref 4.31–10.16)

## 2018-02-05 PROCEDURE — 80053 COMPREHEN METABOLIC PANEL: CPT | Performed by: INTERNAL MEDICINE

## 2018-02-05 PROCEDURE — 85025 COMPLETE CBC W/AUTO DIFF WBC: CPT | Performed by: INTERNAL MEDICINE

## 2018-02-05 PROCEDURE — 99232 SBSQ HOSP IP/OBS MODERATE 35: CPT | Performed by: INTERNAL MEDICINE

## 2018-02-05 RX ADMIN — DOCUSATE SODIUM 100 MG: 100 CAPSULE, LIQUID FILLED ORAL at 09:51

## 2018-02-05 RX ADMIN — ENOXAPARIN SODIUM 40 MG: 40 INJECTION SUBCUTANEOUS at 09:51

## 2018-02-05 RX ADMIN — SENNOSIDES 8.6 MG: 8.6 TABLET, FILM COATED ORAL at 09:51

## 2018-02-05 RX ADMIN — DOCUSATE SODIUM 100 MG: 100 CAPSULE, LIQUID FILLED ORAL at 17:39

## 2018-02-05 RX ADMIN — POLYETHYLENE GLYCOL 3350 17 G: 17 POWDER, FOR SOLUTION ORAL at 09:51

## 2018-02-05 RX ADMIN — SODIUM CHLORIDE 75 ML/HR: 0.9 INJECTION, SOLUTION INTRAVENOUS at 09:56

## 2018-02-05 RX ADMIN — SODIUM CHLORIDE 75 ML/HR: 0.9 INJECTION, SOLUTION INTRAVENOUS at 22:18

## 2018-02-05 NOTE — SOCIAL WORK
Cm spoke with pt's spouse Michael Thayer and agreeable to [facility] for nursing services  Unsure which agency covers that area asked cm to send referral to whichever [facility] would be able to accept  Referral sent to [facility] and [address] [place] [address]

## 2018-02-05 NOTE — ASSESSMENT & PLAN NOTE
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

## 2018-02-05 NOTE — SOCIAL WORK
Cm received confirmation from [facility], they will be able to accept pt  Cm to fax DCI at time of d/c

## 2018-02-05 NOTE — ASSESSMENT & PLAN NOTE
Repeat CMP- janet 14  Probably both obstructive and hepatic  Plan for perc biliary drain by IR tomorrow  DC IVF as abdominal distention

## 2018-02-06 ENCOUNTER — APPOINTMENT (INPATIENT)
Dept: RADIOLOGY | Facility: HOSPITAL | Age: 73
DRG: 435 | End: 2018-02-06
Attending: INTERNAL MEDICINE
Payer: COMMERCIAL

## 2018-02-06 ENCOUNTER — ANESTHESIA (INPATIENT)
Dept: RADIOLOGY | Facility: HOSPITAL | Age: 73
DRG: 435 | End: 2018-02-06
Payer: COMMERCIAL

## 2018-02-06 ENCOUNTER — ANESTHESIA EVENT (INPATIENT)
Dept: RADIOLOGY | Facility: HOSPITAL | Age: 73
DRG: 435 | End: 2018-02-06
Payer: COMMERCIAL

## 2018-02-06 LAB — GLUCOSE SERPL-MCNC: 93 MG/DL (ref 65–140)

## 2018-02-06 PROCEDURE — C1769 GUIDE WIRE: HCPCS

## 2018-02-06 PROCEDURE — 0T9130Z DRAINAGE OF LEFT KIDNEY WITH DRAINAGE DEVICE, PERCUTANEOUS APPROACH: ICD-10-PCS | Performed by: RADIOLOGY

## 2018-02-06 PROCEDURE — 50432 PLMT NEPHROSTOMY CATHETER: CPT

## 2018-02-06 PROCEDURE — C1729 CATH, DRAINAGE: HCPCS

## 2018-02-06 PROCEDURE — 50432 PLMT NEPHROSTOMY CATHETER: CPT | Performed by: RADIOLOGY

## 2018-02-06 PROCEDURE — 47534 PLMT BILIARY DRAINAGE CATH: CPT | Performed by: RADIOLOGY

## 2018-02-06 PROCEDURE — 47531 INJECTION FOR CHOLANGIOGRAM: CPT

## 2018-02-06 PROCEDURE — 82948 REAGENT STRIP/BLOOD GLUCOSE: CPT

## 2018-02-06 PROCEDURE — 99232 SBSQ HOSP IP/OBS MODERATE 35: CPT | Performed by: HOSPITALIST

## 2018-02-06 RX ORDER — ONDANSETRON 2 MG/ML
INJECTION INTRAMUSCULAR; INTRAVENOUS AS NEEDED
Status: DISCONTINUED | OUTPATIENT
Start: 2018-02-06 | End: 2018-02-06 | Stop reason: SURG

## 2018-02-06 RX ORDER — ROCURONIUM BROMIDE 10 MG/ML
INJECTION, SOLUTION INTRAVENOUS AS NEEDED
Status: DISCONTINUED | OUTPATIENT
Start: 2018-02-06 | End: 2018-02-06 | Stop reason: SURG

## 2018-02-06 RX ORDER — CEFAZOLIN SODIUM 1 G/3ML
INJECTION, POWDER, FOR SOLUTION INTRAMUSCULAR; INTRAVENOUS AS NEEDED
Status: DISCONTINUED | OUTPATIENT
Start: 2018-02-06 | End: 2018-02-06 | Stop reason: SURG

## 2018-02-06 RX ORDER — SODIUM CHLORIDE, SODIUM LACTATE, POTASSIUM CHLORIDE, CALCIUM CHLORIDE 600; 310; 30; 20 MG/100ML; MG/100ML; MG/100ML; MG/100ML
20 INJECTION, SOLUTION INTRAVENOUS CONTINUOUS
Status: DISCONTINUED | OUTPATIENT
Start: 2018-02-06 | End: 2018-02-07

## 2018-02-06 RX ORDER — LIDOCAINE HYDROCHLORIDE 10 MG/ML
INJECTION, SOLUTION INFILTRATION; PERINEURAL AS NEEDED
Status: DISCONTINUED | OUTPATIENT
Start: 2018-02-06 | End: 2018-02-06 | Stop reason: SURG

## 2018-02-06 RX ORDER — SODIUM CHLORIDE, SODIUM LACTATE, POTASSIUM CHLORIDE, CALCIUM CHLORIDE 600; 310; 30; 20 MG/100ML; MG/100ML; MG/100ML; MG/100ML
INJECTION, SOLUTION INTRAVENOUS CONTINUOUS PRN
Status: DISCONTINUED | OUTPATIENT
Start: 2018-02-06 | End: 2018-02-06 | Stop reason: SURG

## 2018-02-06 RX ORDER — FENTANYL CITRATE/PF 50 MCG/ML
25 SYRINGE (ML) INJECTION
Status: DISCONTINUED | OUTPATIENT
Start: 2018-02-06 | End: 2018-02-06 | Stop reason: HOSPADM

## 2018-02-06 RX ORDER — FENTANYL CITRATE 50 UG/ML
INJECTION, SOLUTION INTRAMUSCULAR; INTRAVENOUS AS NEEDED
Status: DISCONTINUED | OUTPATIENT
Start: 2018-02-06 | End: 2018-02-06 | Stop reason: SURG

## 2018-02-06 RX ORDER — GLYCOPYRROLATE 0.2 MG/ML
INJECTION INTRAMUSCULAR; INTRAVENOUS AS NEEDED
Status: DISCONTINUED | OUTPATIENT
Start: 2018-02-06 | End: 2018-02-06 | Stop reason: SURG

## 2018-02-06 RX ORDER — HYDRALAZINE HYDROCHLORIDE 20 MG/ML
5 INJECTION INTRAMUSCULAR; INTRAVENOUS EVERY 6 HOURS PRN
Status: DISCONTINUED | OUTPATIENT
Start: 2018-02-06 | End: 2018-02-12

## 2018-02-06 RX ORDER — ONDANSETRON 2 MG/ML
4 INJECTION INTRAMUSCULAR; INTRAVENOUS ONCE AS NEEDED
Status: DISCONTINUED | OUTPATIENT
Start: 2018-02-06 | End: 2018-02-06 | Stop reason: HOSPADM

## 2018-02-06 RX ORDER — PROPOFOL 10 MG/ML
INJECTION, EMULSION INTRAVENOUS AS NEEDED
Status: DISCONTINUED | OUTPATIENT
Start: 2018-02-06 | End: 2018-02-06 | Stop reason: SURG

## 2018-02-06 RX ADMIN — PROPOFOL 150 MG: 10 INJECTION, EMULSION INTRAVENOUS at 12:06

## 2018-02-06 RX ADMIN — FENTANYL CITRATE 25 MCG: 50 INJECTION INTRAMUSCULAR; INTRAVENOUS at 14:29

## 2018-02-06 RX ADMIN — DEXAMETHASONE SODIUM PHOSPHATE 10 MG: 10 INJECTION INTRAMUSCULAR; INTRAVENOUS at 12:20

## 2018-02-06 RX ADMIN — ROCURONIUM BROMIDE 30 MG: 10 INJECTION INTRAVENOUS at 12:06

## 2018-02-06 RX ADMIN — IOHEXOL 20 ML: 300 INJECTION, SOLUTION INTRAVENOUS at 13:06

## 2018-02-06 RX ADMIN — LIDOCAINE HYDROCHLORIDE 50 MG: 10 INJECTION, SOLUTION INFILTRATION; PERINEURAL at 12:06

## 2018-02-06 RX ADMIN — SODIUM CHLORIDE, SODIUM LACTATE, POTASSIUM CHLORIDE, AND CALCIUM CHLORIDE: .6; .31; .03; .02 INJECTION, SOLUTION INTRAVENOUS at 11:40

## 2018-02-06 RX ADMIN — SODIUM CHLORIDE 75 ML/HR: 0.9 INJECTION, SOLUTION INTRAVENOUS at 14:36

## 2018-02-06 RX ADMIN — FENTANYL CITRATE 50 MCG: 50 INJECTION, SOLUTION INTRAMUSCULAR; INTRAVENOUS at 12:06

## 2018-02-06 RX ADMIN — OXYCODONE HYDROCHLORIDE 5 MG: 5 SOLUTION ORAL at 18:21

## 2018-02-06 RX ADMIN — GLYCOPYRROLATE 0.2 MG: 0.2 INJECTION, SOLUTION INTRAMUSCULAR; INTRAVENOUS at 13:41

## 2018-02-06 RX ADMIN — OXYCODONE HYDROCHLORIDE 5 MG: 5 SOLUTION ORAL at 22:25

## 2018-02-06 RX ADMIN — DOCUSATE SODIUM 100 MG: 100 CAPSULE, LIQUID FILLED ORAL at 09:12

## 2018-02-06 RX ADMIN — ENOXAPARIN SODIUM 40 MG: 40 INJECTION SUBCUTANEOUS at 09:12

## 2018-02-06 RX ADMIN — CEFAZOLIN 1000 MG: 1 INJECTION, POWDER, FOR SOLUTION INTRAVENOUS at 12:21

## 2018-02-06 RX ADMIN — FENTANYL CITRATE 25 MCG: 50 INJECTION INTRAMUSCULAR; INTRAVENOUS at 14:20

## 2018-02-06 RX ADMIN — ONDANSETRON 4 MG: 2 INJECTION INTRAMUSCULAR; INTRAVENOUS at 13:37

## 2018-02-06 RX ADMIN — NEOSTIGMINE METHYLSULFATE 2 MG: 1 INJECTION, SOLUTION INTRAMUSCULAR; INTRAVENOUS; SUBCUTANEOUS at 13:41

## 2018-02-06 RX ADMIN — FENTANYL CITRATE 50 MCG: 50 INJECTION, SOLUTION INTRAMUSCULAR; INTRAVENOUS at 12:48

## 2018-02-06 RX ADMIN — IOHEXOL 40 ML: 300 INJECTION, SOLUTION INTRAVENOUS at 13:58

## 2018-02-06 NOTE — ANESTHESIA POSTPROCEDURE EVALUATION
Post-Op Assessment Note      CV Status:  Stable    Mental Status:  Alert and awake    Hydration Status:  Euvolemic    PONV Controlled:  Controlled    Airway Patency:  Patent    Post Op Vitals Reviewed: Yes          Staff: CRNA           /63 (02/06/18 1349)    Temp 98 5 °F (36 9 °C) (02/06/18 1358)    Pulse 62 (02/06/18 1358)   Resp 15 (02/06/18 1358)    SpO2 100 % (02/06/18 1358)

## 2018-02-06 NOTE — ANESTHESIA PREPROCEDURE EVALUATION
Review of Systems/Medical History          Cardiovascular   Pulmonary  Smoker ex-smoker  , COPD ,        GI/Hepatic    GERD ,   Comment: Malignancy, hepatic mass     Kidney disease ,        Endo/Other     GYN       Hematology   Musculoskeletal    Arthritis     Neurology   Psychology           Physical Exam    Airway    Mallampati score: II         Dental   No notable dental hx     Cardiovascular      Pulmonary      Other Findings        Anesthesia Plan  ASA Score- 3     Anesthesia Type- general with ASA Monitors  Additional Monitors:   Airway Plan: ETT  Comment: I, Dr Masha Kat, the attending physician, have personally seen and evaluated the patient prior to anesthetic care  I have reviewed the pre-anesthetic record, and other medical records if appropriate to the anesthetic care  If a CRNA is involved in the case, I have reviewed the CRNA assessment, if present, and agree  The patient is in a suitable condition to proceed with my formulated anesthetic plan        Plan Factors-    Induction- intravenous  Postoperative Plan-     Informed Consent- Anesthetic plan and risks discussed with patient  I personally reviewed this patient with the CRNA  Discussed and agreed on the Anesthesia Plan with the CRNA  Lore Antonio

## 2018-02-06 NOTE — BRIEF OP NOTE (RAD/CATH)
IR TUBE PLACEMENT MARCE  IR TUBE PLACEMENT NEPHROSTOMY  Procedure Note    PATIENT NAME: Natalya Santiago  : 1945  MRN: [id number]     Pre-op Diagnosis:   1  Jaundice    2  Abnormal CT of the abdomen    3  Abnormal LFTs    4  Weight loss, non-intentional    5  Hydronephrosis, unspecified hydronephrosis type      Post-op Diagnosis:   1  Jaundice    2  Abnormal CT of the abdomen    3  Abnormal LFTs    4  Weight loss, non-intentional    5  Hydronephrosis, unspecified hydronephrosis type        Surgeon:   Inocencia Joel MD  Assistants:     No qualified resident was available, Resident is only observing    Estimated Blood Loss: Minimal  Findings: 8 5 Fr left PCN placed  Antegrade pyelogram shows distal occlusion  Bilateral 10 Fr int/ext biliary drains placed      Specimens: None    Complications:  none    Anesthesia: General    Inocencia Joel MD     Date: 2018  Time: 1:46 PM

## 2018-02-06 NOTE — ASSESSMENT & PLAN NOTE
· Painless jaundice, both obstructive and hepatic possibly  · Bilirubin continues to be near 14  · Obstruction secondary to mass which has been biopsied  · Going to IR today for percutaneous biliary drain   · GI following peripherally and would monitor bilirubin post drain placement

## 2018-02-06 NOTE — ASSESSMENT & PLAN NOTE
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

## 2018-02-06 NOTE — PROGRESS NOTES
Progress Note - Keyana العلي 1945, 71 y o  male MRN: [id number]    Unit/Bed#: -01 Encounter: [id number]    Primary Care Provider: Audrey Silva DO   Date and time admitted to hospital: 1/31/2018  7:35 PM        Jaundice   Assessment & Plan    · Painless jaundice, both obstructive and hepatic possibly  · Bilirubin continues to be near 14  · Obstruction secondary to mass which has been biopsied  · Going to IR today for percutaneous biliary drain   · GI following peripherally and would monitor bilirubin post drain placement        Other hydronephrosis   Assessment & Plan    · Lt sided tortuous ureter but no clear obstruction  · 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 Internal Medicine Progress Note  Patient: Keyana العلي 71 y o  male   MRN: [id number]  PCP: Audrey Silva DO  Unit/Bed#: -81 Encounter: [id number]  Date Of Visit: 02/06/18    Assessment:    Principal Problem:    Liver mass  Active Problems:    Jaundice    Chronic low back pain    Chronic obstructive pulmonary disease (HCC)    Fatigue    Abnormal CT of the abdomen    Abnormal LFTs    Weight loss, non-intentional    UTI (urinary tract infection)    Other hydronephrosis        VTE Pharmacologic Prophylaxis:   Pharmacologic: Enoxaparin (Lovenox)  Mechanical VTE Prophylaxis in Place: Yes    Patient Centered Rounds: I have performed bedside rounds with nursing staff today     Discussions with Specialists or Other Care Team Provider:     Education and Discussions with Family / Patient: pateint    Time Spent for Care: 30 minutes  More than 50% of total time spent on counseling and coordination of care as described above  Current Length of Stay: 6 day(s)    Current Patient Status: Inpatient   Certification Statement: The patient will continue to require additional inpatient hospital stay due to IR procedure    Discharge Plan / Estimated Discharge Date: over next 1-2 days    Code Status: Level 1 - Full Code      Subjective:   Feels ok, + BM, no N/V, occ vague abdo symptoms    Objective:     Vitals:   Temp (24hrs), Av 1 °F (36 7 °C), Min:97 6 °F (36 4 °C), Max:98 8 °F (37 1 °C)    HR:  [70-78] 78  Resp:  [18-21] 21  BP: (151-168)/(74-87) 159/84  SpO2:  [96 %-100 %] 96 %  Body mass index is 19 04 kg/m²  Input and Output Summary (last 24 hours): Intake/Output Summary (Last 24 hours) at 18 1149  Last data filed at 18 0900   Gross per 24 hour   Intake           1227 5 ml   Output              675 ml   Net            552 5 ml       Physical Exam:     Physical Exam   Constitutional: He is oriented to person, place, and time  He appears well-developed and well-nourished  HENT:   Head: Normocephalic and atraumatic  Eyes: Conjunctivae are normal  Scleral icterus is present  Cardiovascular: Normal rate, regular rhythm and normal heart sounds  Exam reveals no gallop and no friction rub  No murmur heard  Pulmonary/Chest: Effort normal and breath sounds normal  No respiratory distress  He has no wheezes  He has no rales  Abdominal: Soft  He exhibits no distension  There is no tenderness  Musculoskeletal: He exhibits no edema  Neurological: He is alert and oriented to person, place, and time           Additional Data:     Labs:      Results from last 7 days  Lab Units 18  0520   WBC Thousand/uL 14 02*   HEMOGLOBIN g/dL 9 1*   HEMATOCRIT % 26 9* PLATELETS Thousands/uL 259   NEUTROS PCT % 77*   LYMPHS PCT % 12*   MONOS PCT % 9   EOS PCT % 1       Results from last 7 days  Lab Units 02/05/18  0520   SODIUM mmol/L 140   POTASSIUM mmol/L 3 4*   CHLORIDE mmol/L 111*   CO2 mmol/L 19*   BUN mg/dL 16   CREATININE mg/dL 1 05   CALCIUM mg/dL 8 3   TOTAL PROTEIN g/dL 6 3*   BILIRUBIN TOTAL mg/dL 14 00*   ALK PHOS U/L 373*   ALT U/L 70   AST U/L 68*   GLUCOSE RANDOM mg/dL 95       Results from last 7 days  Lab Units 02/02/18  0900   INR  1 52*       * I Have Reviewed All Lab Data Listed Above  * Additional Pertinent Lab Tests Reviewed: All Labs Within Last 24 Hours Reviewed    Imaging:    Imaging Reports Reviewed Today Include:   Imaging Personally Reviewed by Myself Includes:      Recent Cultures (last 7 days):           Last 24 Hours Medication List:     Current Facility-Administered Medications:  acetaminophen 650 mg Oral Q6H PRN Farhan Peguero MD    albuterol 2 puff Inhalation Q4H PRN Farhan Peguero MD    docusate sodium 100 mg Oral BID Farhan Peguero MD    enoxaparin 40 mg Subcutaneous Daily Farhan Peguero MD    hydrALAZINE 5 mg Intravenous Q6H PRN Ras Hdez PA-C    HYDROmorphone 0 5 mg Intravenous Q3H PRN Farhan Peguero MD    morphine injection 4 mg Intravenous Once Micheal Pidonovana,     ondansetron 4 mg Intravenous Q6H PRN Farhan Peguero MD    oxyCODONE 5 mg Oral Q4H PRN Farhan Peguero MD    polyethylene glycol 17 g Oral Daily Farhan Peguero MD    senna 1 tablet Oral Daily Farhan Peguero MD    sodium chloride 75 mL/hr Intravenous Continuous Ira Fleming MD Last Rate: 75 mL/hr (02/05/18 5126)        Today, Patient Was Seen By: Ignacio Hoyt MD    ** Please Note: This note has been constructed using a voice recognition system   **

## 2018-02-06 NOTE — PROGRESS NOTES
Progress Note - Conner Boswell 1945, 71 y o  male MRN: [id number]    Unit/Bed#: -01 Encounter: [id number]    Primary Care Provider: Chai Ireland DO   Date and time admitted to hospital: 2018  7:35 PM        Jaundice   Assessment & Plan    Repeat CMP- janet 14  Probably both obstructive and hepatic  Plan for perc biliary drain by IR tomorrow  DC IVF as abdominal distention        * Liver mass   Assessment & Plan    Suspected Diffuse infiltrative process s/p Bx - results pending  Consult Hemonc once Bx back          Other hydronephrosis   Assessment & Plan    Lt sided tortuous ureter but no clear obstruction  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  Will probably need perc Nephrostomy tube placement Lt side by IR per Urology  Discussed again today - no change in plan        Weight loss, non-intentional   Assessment & Plan    R/o malignancy as cause  Bx pending  Note CA 19-9 high 7341        Chronic obstructive pulmonary disease (HCC)   Assessment & Plan    No exacerbation          VTE Pharmacologic Prophylaxis: Enoxaparin (Lovenox)  Mechanical: Mechanical VTE prophylaxis in place  Patient Centered Rounds: I have performed bedside rounds with nursing staff today  Discussions with Specialists or Other Care Team Provider:     Education and Discussions with Family / Patient:     Time Spent for Care: More than 50% of total time spent on counseling and coordination of care as described above      Current Length of Stay: 5 day(s)    Current Patient Status: Inpatient   Certification Statement: The patient will continue to require additional inpatient hospital stay due to pending biliary and nephrostomy drains    Discharge Plan:    Code Status: Level 1 - Full Code      Subjective: no new complaints    Objective:     Vitals:   Temp (24hrs), Av 4 °F (36 9 °C), Min:97 6 °F (36 4 °C), Max:99 2 °F (37 3 °C)    HR:  [70-72] 70  Resp:  [18-20] 18  BP: (147-168)/(74-87) 168/87  SpO2:  [96 %-100 %] 100 %  Body mass index is 19 04 kg/m²  Input and Output Summary (last 24 hours): Intake/Output Summary (Last 24 hours) at 02/05/18 1940  Last data filed at 02/05/18 1701   Gross per 24 hour   Intake          2821 25 ml   Output              600 ml   Net          2221 25 ml       Physical Exam   Constitutional: He is oriented to person, place, and time  HENT:   Head: Normocephalic  Eyes: Pupils are equal, round, and reactive to light  Abdominal: Soft  Bowel sounds are normal  He exhibits distension  Neurological: He is alert and oriented to person, place, and time  Skin:   icteric         Additional Data:     Labs:      Results from last 7 days  Lab Units 02/05/18  0520   WBC Thousand/uL 14 02*   HEMOGLOBIN g/dL 9 1*   HEMATOCRIT % 26 9*   PLATELETS Thousands/uL 259   NEUTROS PCT % 77*   LYMPHS PCT % 12*   MONOS PCT % 9   EOS PCT % 1       Results from last 7 days  Lab Units 02/05/18  0520   SODIUM mmol/L 140   POTASSIUM mmol/L 3 4*   CHLORIDE mmol/L 111*   CO2 mmol/L 19*   BUN mg/dL 16   CREATININE mg/dL 1 05   CALCIUM mg/dL 8 3   TOTAL PROTEIN g/dL 6 3*   BILIRUBIN TOTAL mg/dL 14 00*   ALK PHOS U/L 373*   ALT U/L 70   AST U/L 68*   GLUCOSE RANDOM mg/dL 95       Results from last 7 days  Lab Units 02/02/18  0900   INR  1 52*       * I Have Reviewed All Lab Data Listed Above      Imaging:  Imaging Personally Reviewed by Myself Includes:     Cultures:   Blood Culture: No results found for: BLOODCX  Urine Culture: No results found for: URINECX  Sputum Culture: No components found for: SPUTUMCX  Wound Culture: No results found for: WOUNDCULT    Last 24 Hours Medication List:     Current Facility-Administered Medications:  acetaminophen 650 mg Oral Q6H PRN Brianna Cheryl, MD    albuterol 2 puff Inhalation Q4H PRN Brianna Luna, MD    docusate sodium 100 mg Oral BID Brianna List, MD    enoxaparin 40 mg Subcutaneous Daily Brianna List, MD    HYDROmorphone 0 5 mg Intravenous Q3H PRN Jacob Barkley Dianna Hull MD    morphine injection 4 mg Intravenous Once Herb DO Caryn    ondansetron 4 mg Intravenous Q6H PRN Marlee Krueger MD    oxyCODONE 5 mg Oral Q4H PRN Marlee Krueger MD    polyethylene glycol 17 g Oral Daily Marlee Krueger MD    senna 1 tablet Oral Daily Marlee Krueger MD    sodium chloride 75 mL/hr Intravenous Continuous Bebe Lagnley MD Last Rate: 75 mL/hr (02/05/18 0956)        Today, Patient Was Seen By: Bebe Langley MD    ** Please Note: Dragon 360 Dictation voice to text software may have been used in the creation of this document   **

## 2018-02-07 ENCOUNTER — APPOINTMENT (INPATIENT)
Dept: RADIOLOGY | Facility: HOSPITAL | Age: 73
DRG: 435 | End: 2018-02-07
Payer: COMMERCIAL

## 2018-02-07 PROBLEM — C79.9 METASTATIC ADENOCARCINOMA (HCC): Status: ACTIVE | Noted: 2018-02-07

## 2018-02-07 LAB
ALBUMIN SERPL BCP-MCNC: 1.4 G/DL (ref 3.5–5)
ALP SERPL-CCNC: 347 U/L (ref 46–116)
ALT SERPL W P-5'-P-CCNC: 73 U/L (ref 12–78)
ANION GAP SERPL CALCULATED.3IONS-SCNC: 7 MMOL/L (ref 4–13)
AST SERPL W P-5'-P-CCNC: 104 U/L (ref 5–45)
BASOPHILS # BLD AUTO: 0.06 THOUSANDS/ΜL (ref 0–0.1)
BASOPHILS NFR BLD AUTO: 0 % (ref 0–1)
BILIRUB SERPL-MCNC: 14.82 MG/DL (ref 0.2–1)
BUN SERPL-MCNC: 18 MG/DL (ref 5–25)
CALCIUM SERPL-MCNC: 8.2 MG/DL (ref 8.3–10.1)
CHLORIDE SERPL-SCNC: 111 MMOL/L (ref 100–108)
CO2 SERPL-SCNC: 21 MMOL/L (ref 21–32)
CREAT SERPL-MCNC: 1.1 MG/DL (ref 0.6–1.3)
EOSINOPHIL # BLD AUTO: 0.04 THOUSAND/ΜL (ref 0–0.61)
EOSINOPHIL NFR BLD AUTO: 0 % (ref 0–6)
ERYTHROCYTE [DISTWIDTH] IN BLOOD BY AUTOMATED COUNT: 22.3 % (ref 11.6–15.1)
GFR SERPL CREATININE-BSD FRML MDRD: 67 ML/MIN/1.73SQ M
GLUCOSE SERPL-MCNC: 95 MG/DL (ref 65–140)
HCT VFR BLD AUTO: 26 % (ref 36.5–49.3)
HGB BLD-MCNC: 8.9 G/DL (ref 12–17)
LYMPHOCYTES # BLD AUTO: 1.47 THOUSANDS/ΜL (ref 0.6–4.47)
LYMPHOCYTES NFR BLD AUTO: 9 % (ref 14–44)
MCH RBC QN AUTO: 29.5 PG (ref 26.8–34.3)
MCHC RBC AUTO-ENTMCNC: 34.2 G/DL (ref 31.4–37.4)
MCV RBC AUTO: 86 FL (ref 82–98)
MONOCYTES # BLD AUTO: 1.41 THOUSAND/ΜL (ref 0.17–1.22)
MONOCYTES NFR BLD AUTO: 9 % (ref 4–12)
NEUTROPHILS # BLD AUTO: 13.07 THOUSANDS/ΜL (ref 1.85–7.62)
NEUTS SEG NFR BLD AUTO: 82 % (ref 43–75)
NRBC BLD AUTO-RTO: 0 /100 WBCS
PLATELET # BLD AUTO: 260 THOUSANDS/UL (ref 149–390)
PMV BLD AUTO: 10.2 FL (ref 8.9–12.7)
POTASSIUM SERPL-SCNC: 3.6 MMOL/L (ref 3.5–5.3)
PROT SERPL-MCNC: 6.1 G/DL (ref 6.4–8.2)
RBC # BLD AUTO: 3.02 MILLION/UL (ref 3.88–5.62)
SODIUM SERPL-SCNC: 139 MMOL/L (ref 136–145)
WBC # BLD AUTO: 16.22 THOUSAND/UL (ref 4.31–10.16)

## 2018-02-07 PROCEDURE — 85025 COMPLETE CBC W/AUTO DIFF WBC: CPT | Performed by: HOSPITALIST

## 2018-02-07 PROCEDURE — 99232 SBSQ HOSP IP/OBS MODERATE 35: CPT | Performed by: HOSPITALIST

## 2018-02-07 PROCEDURE — 80053 COMPREHEN METABOLIC PANEL: CPT | Performed by: HOSPITALIST

## 2018-02-07 PROCEDURE — 71250 CT THORAX DX C-: CPT

## 2018-02-07 RX ORDER — OXYCODONE HCL 5 MG/5 ML
10 SOLUTION, ORAL ORAL EVERY 6 HOURS PRN
Status: DISCONTINUED | OUTPATIENT
Start: 2018-02-07 | End: 2018-02-10

## 2018-02-07 RX ORDER — FAMOTIDINE 20 MG/1
20 TABLET, FILM COATED ORAL DAILY
Status: DISCONTINUED | OUTPATIENT
Start: 2018-02-07 | End: 2018-02-12

## 2018-02-07 RX ADMIN — ENOXAPARIN SODIUM 40 MG: 40 INJECTION SUBCUTANEOUS at 09:00

## 2018-02-07 RX ADMIN — SODIUM CHLORIDE 75 ML/HR: 0.9 INJECTION, SOLUTION INTRAVENOUS at 03:56

## 2018-02-07 RX ADMIN — OXYCODONE HYDROCHLORIDE 5 MG: 5 SOLUTION ORAL at 17:09

## 2018-02-07 RX ADMIN — OXYCODONE HYDROCHLORIDE 5 MG: 5 SOLUTION ORAL at 13:31

## 2018-02-07 RX ADMIN — SENNOSIDES 8.6 MG: 8.6 TABLET, FILM COATED ORAL at 09:00

## 2018-02-07 RX ADMIN — DOCUSATE SODIUM 100 MG: 100 CAPSULE, LIQUID FILLED ORAL at 09:00

## 2018-02-07 RX ADMIN — DOCUSATE SODIUM 100 MG: 100 CAPSULE, LIQUID FILLED ORAL at 17:11

## 2018-02-07 RX ADMIN — FAMOTIDINE 20 MG: 20 TABLET, FILM COATED ORAL at 17:09

## 2018-02-07 RX ADMIN — HYDROMORPHONE HYDROCHLORIDE 0.5 MG: 1 INJECTION, SOLUTION INTRAMUSCULAR; INTRAVENOUS; SUBCUTANEOUS at 09:00

## 2018-02-07 RX ADMIN — OXYCODONE HYDROCHLORIDE 5 MG: 5 SOLUTION ORAL at 06:09

## 2018-02-07 RX ADMIN — POLYETHYLENE GLYCOL 3350 17 G: 17 POWDER, FOR SOLUTION ORAL at 09:00

## 2018-02-07 RX ADMIN — OXYCODONE HYDROCHLORIDE 5 MG: 5 SOLUTION ORAL at 23:58

## 2018-02-07 NOTE — ASSESSMENT & PLAN NOTE
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

## 2018-02-07 NOTE — ASSESSMENT & PLAN NOTE
· Bx from mass near liver came positive for adenocarcinoma suspicious of pancreatic or biliary origin  · CA 19-9 = 7000  · Get CT chest for staging  · Consult Oncology for input

## 2018-02-07 NOTE — ASSESSMENT & PLAN NOTE
· Painless jaundice, mainly obstructive + some infiltrative component 2/2 tumor masses  · Bilirubin continues to be near 14  · Obstruction secondary to mass which has been biopsied  · S/p 2 percutaneous biliary drains placed on 2/6/18 - draining well, but has RUQ pain  · Pain control

## 2018-02-07 NOTE — PROGRESS NOTES
L nephrostomy tube with minimal output into bag  Is bloody  Also mod amt of bloody drainage on dressing  Dressing changed  Nephrosotomy tube irrigated and patent  IR notified previously about drainage, will cont to monitor

## 2018-02-07 NOTE — PROGRESS NOTES
Pt c/o severe pain on the right side of abdomen  Notified Dr Carri Samson including the resistance felt when attempted to flush tube  Clot noted and relayed  Oxy 5mg given per Dr Steve Console  Will continue to monitor

## 2018-02-07 NOTE — PROGRESS NOTES
Progress Note - Malik Henning 1945, 71 y o  male MRN: [id number]    Unit/Bed#: -01 Encounter: [id number]    Primary Care Provider: Aaron Ramirez DO   Date and time admitted to hospital: 1/31/2018  7:35 PM        Jaundice   Assessment & Plan    · Painless jaundice, mainly obstructive + some infiltrative component 2/2 tumor masses  · Bilirubin continues to be near 14  · Obstruction secondary to mass which has been biopsied  · S/p 2 percutaneous biliary drains placed on 2/6/18 - draining well, but hs RUQ pain        Metastatic adenocarcinoma (HCC)   Assessment & Plan    · Bx from mass near liver came positive for adenocarcinoma suspicious of pancreatic or biliary origin  · CA 19-9 = 7000  · Get CT chest for staging  · Consult Oncology for input        Other hydronephrosis   Assessment & Plan    · Lt sided tortuous ureter but no clear obstruction  · Suspected 2" infiltrative tumour causing extrinsic compression  · Evaluated by Urology this admission, recommend left-sided percutaneous nephrostomy placement as per urethral stent placement would be difficult anatomically  · Status post left-sided percutaneous nephrostomy placed on 02/06/2018        Weight loss, non-intentional   Assessment & Plan    2/2 malignancy        Chronic low back pain   Assessment & Plan    2" known DJD spine per previous imaging  Continue prn medications for pain            [address] Internal Medicine Progress Note  Patient: Malik Henning 71 y o  male   MRN: [id number]  PCP: Aaron Ramirez DO  Unit/Bed#: -00 Encounter: [id number]  Date Of Visit: 02/07/18    Assessment:    Principal Problem:    Liver mass  Active Problems:    Jaundice    Abnormal LFTs    Other hydronephrosis    Metastatic adenocarcinoma (HCC)    Chronic low back pain    Chronic obstructive pulmonary disease (HCC)    Fatigue    Abnormal CT of the abdomen    Weight loss, non-intentional    UTI (urinary tract infection)        VTE Pharmacologic Prophylaxis: Pharmacologic: Enoxaparin (Lovenox)  Mechanical VTE Prophylaxis in Place: Yes    Patient Centered Rounds: I have performed bedside rounds with nursing staff today  Discussions with Specialists or Other Care Team Provider:     Education and Discussions with Family / Patient:  Patient and girlfriend    Time Spent for Care: 30 minutes  More than 50% of total time spent on counseling and coordination of care as described above  Current Length of Stay: 7 day(s)    Current Patient Status: Inpatient   Certification Statement: The patient will continue to require additional inpatient hospital stay due to Oncology evaluation, monitor postprocedure pain    Discharge Plan / Estimated Discharge Date:  Anticipate tomorrow    Code Status: Level 1 - Full Code      Subjective:   Feels okay, since the drain placement he has been having pain in the epigastrium and right upper quadrant area, more so with changes in position  No nausea and vomiting no diarrhea  No chest pain shortness of breath    Objective:     Vitals:   Temp (24hrs), Av 3 °F (36 8 °C), Min:98 2 °F (36 8 °C), Max:98 4 °F (36 9 °C)    HR:  [74-79] 74  Resp:  [12-20] 20  BP: (145-154)/(68-73) 145/72  SpO2:  [96 %-98 %] 96 %  Body mass index is 19 04 kg/m²  Input and Output Summary (last 24 hours): Intake/Output Summary (Last 24 hours) at 18 1551  Last data filed at 18 0953   Gross per 24 hour   Intake              120 ml   Output              875 ml   Net             -755 ml       Physical Exam:     Physical Exam   Constitutional: He is oriented to person, place, and time  He appears well-developed and well-nourished  HENT:   Head: Normocephalic and atraumatic  Eyes: Conjunctivae are normal  Scleral icterus is present  Cardiovascular: Normal rate, regular rhythm and normal heart sounds  Exam reveals no gallop and no friction rub  No murmur heard    Pulmonary/Chest: Effort normal and breath sounds normal  No respiratory distress  He has no wheezes  He has no rales  Abdominal: Soft  He exhibits no distension  There is no tenderness  Musculoskeletal: He exhibits no edema  Neurological: He is alert and oriented to person, place, and time  Additional Data:     Labs:      Results from last 7 days  Lab Units 02/07/18  0512   WBC Thousand/uL 16 22*   HEMOGLOBIN g/dL 8 9*   HEMATOCRIT % 26 0*   PLATELETS Thousands/uL 260   NEUTROS PCT % 82*   LYMPHS PCT % 9*   MONOS PCT % 9   EOS PCT % 0       Results from last 7 days  Lab Units 02/07/18  0512   SODIUM mmol/L 139   POTASSIUM mmol/L 3 6   CHLORIDE mmol/L 111*   CO2 mmol/L 21   BUN mg/dL 18   CREATININE mg/dL 1 10   CALCIUM mg/dL 8 2*   TOTAL PROTEIN g/dL 6 1*   BILIRUBIN TOTAL mg/dL 14 82*   ALK PHOS U/L 347*   ALT U/L 73   AST U/L 104*   GLUCOSE RANDOM mg/dL 95       Results from last 7 days  Lab Units 02/02/18  0900   INR  1 52*       * I Have Reviewed All Lab Data Listed Above  * Additional Pertinent Lab Tests Reviewed:  All Labs Within Last 24 Hours Reviewed    Imaging:    Imaging Reports Reviewed Today Include:   Imaging Personally Reviewed by Myself Includes:      Recent Cultures (last 7 days):           Last 24 Hours Medication List:     Current Facility-Administered Medications:  acetaminophen 650 mg Oral Q6H PRN Gary Morrison MD   albuterol 2 puff Inhalation Q4H PRN Gary Morrison MD   docusate sodium 100 mg Oral BID Gary Morrison MD   enoxaparin 40 mg Subcutaneous Daily Gary Morrison MD   famotidine 20 mg Oral Daily Zabrina Hay MD   hydrALAZINE 5 mg Intravenous Q6H PRN Jonny Hdez PA-C   HYDROmorphone 0 5 mg Intravenous Q3H PRN Gary Morrison MD   morphine injection 4 mg Intravenous Once Ahmet Norton DO   ondansetron 4 mg Intravenous Q6H PRN Gary Morrison MD   oxyCODONE 5 mg Oral Q4H PRN Gary Morrison MD   polyethylene glycol 17 g Oral Daily Gary Morrison MD   senna 1 tablet Oral Daily Gary Morrison MD        Today, Patient Was Seen By: Ellie Parks MD    ** Please Note: This note has been constructed using a voice recognition system   **

## 2018-02-07 NOTE — PROGRESS NOTES
Mariaelena from [facility] aware of Calcium results  Instructed to notify renal- ok to wait until they see pt

## 2018-02-07 NOTE — SOCIAL WORK
CM reviewed Pt in care coordination rounds, S/p 2 percutaneous biliary drains placed on 2/6/18  Poss D/C home tomorrow, Kristal Pandya able to accept for drain management

## 2018-02-07 NOTE — PLAN OF CARE
Problem: INFECTION - ADULT  Goal: Absence of fever/infection during neutropenic period  INTERVENTIONS:  - Monitor WBC  - Implement neutropenic guidelines   Outcome: Completed Date Met: 02/07/18

## 2018-02-08 LAB
ALBUMIN SERPL BCP-MCNC: 1.3 G/DL (ref 3.5–5)
ALP SERPL-CCNC: 329 U/L (ref 46–116)
ALT SERPL W P-5'-P-CCNC: 61 U/L (ref 12–78)
ANION GAP SERPL CALCULATED.3IONS-SCNC: 9 MMOL/L (ref 4–13)
AST SERPL W P-5'-P-CCNC: 77 U/L (ref 5–45)
BILIRUB SERPL-MCNC: 13.71 MG/DL (ref 0.2–1)
BUN SERPL-MCNC: 23 MG/DL (ref 5–25)
CALCIUM SERPL-MCNC: 8.1 MG/DL (ref 8.3–10.1)
CHLORIDE SERPL-SCNC: 109 MMOL/L (ref 100–108)
CO2 SERPL-SCNC: 22 MMOL/L (ref 21–32)
CREAT SERPL-MCNC: 1.04 MG/DL (ref 0.6–1.3)
GFR SERPL CREATININE-BSD FRML MDRD: 72 ML/MIN/1.73SQ M
GLUCOSE SERPL-MCNC: 98 MG/DL (ref 65–140)
POTASSIUM SERPL-SCNC: 3.5 MMOL/L (ref 3.5–5.3)
PROT SERPL-MCNC: 6 G/DL (ref 6.4–8.2)
SODIUM SERPL-SCNC: 140 MMOL/L (ref 136–145)

## 2018-02-08 PROCEDURE — 80053 COMPREHEN METABOLIC PANEL: CPT | Performed by: HOSPITALIST

## 2018-02-08 PROCEDURE — 99232 SBSQ HOSP IP/OBS MODERATE 35: CPT | Performed by: HOSPITALIST

## 2018-02-08 PROCEDURE — 99232 SBSQ HOSP IP/OBS MODERATE 35: CPT | Performed by: INTERNAL MEDICINE

## 2018-02-08 PROCEDURE — 99233 SBSQ HOSP IP/OBS HIGH 50: CPT | Performed by: NURSE PRACTITIONER

## 2018-02-08 RX ADMIN — FAMOTIDINE 20 MG: 20 TABLET, FILM COATED ORAL at 08:58

## 2018-02-08 RX ADMIN — POLYETHYLENE GLYCOL 3350 17 G: 17 POWDER, FOR SOLUTION ORAL at 08:58

## 2018-02-08 RX ADMIN — ENOXAPARIN SODIUM 40 MG: 40 INJECTION SUBCUTANEOUS at 08:58

## 2018-02-08 RX ADMIN — OXYCODONE HYDROCHLORIDE 5 MG: 5 SOLUTION ORAL at 07:23

## 2018-02-08 RX ADMIN — DOCUSATE SODIUM 100 MG: 100 CAPSULE, LIQUID FILLED ORAL at 08:58

## 2018-02-08 RX ADMIN — SENNOSIDES 8.6 MG: 8.6 TABLET, FILM COATED ORAL at 08:58

## 2018-02-08 NOTE — PROGRESS NOTES
UROLOGY PROGRESS NOTE   Patient Identifiers: Shivani Ames (MRN [id number])  Date of Service: 2/8/2018    Subjective:     24 HR EVENTS:   no significant events  Patient has  no complaints  Objective:     VITALS:    Vitals:    02/08/18 0723   BP: 124/61   Pulse: 70   Resp: 16   Temp: 98 1 °F (36 7 °C)   SpO2: 95%       INS & OUTS:  I/O last 24 hours:   In: 400 [P O :400]  Out: 1085 [Urine:400; Drains:685]    LABS:  Lab Results   Component Value Date    HGB 8 9 (L) 02/07/2018    HCT 26 0 (L) 02/07/2018    WBC 16 22 (H) 02/07/2018     02/07/2018   ]    Lab Results   Component Value Date     02/08/2018    K 3 5 02/08/2018     (H) 02/08/2018    CO2 22 02/08/2018    BUN 23 02/08/2018    CREATININE 1 04 02/08/2018    CALCIUM 8 1 (L) 02/08/2018    GLUCOSE 98 02/08/2018   ]    INPATIENT MEDS:    Current Facility-Administered Medications:     acetaminophen (TYLENOL) tablet 650 mg, 650 mg, Oral, Q6H PRN, Gary Morrison MD, 650 mg at 02/01/18 0734    albuterol (PROVENTIL HFA,VENTOLIN HFA) inhaler 2 puff, 2 puff, Inhalation, Q4H PRN, Gary Morrison MD    docusate sodium (COLACE) capsule 100 mg, 100 mg, Oral, BID, Gary Morrison MD, 100 mg at 02/08/18 0858    enoxaparin (LOVENOX) subcutaneous injection 40 mg, 40 mg, Subcutaneous, Daily, Gary Morrison MD, 40 mg at 02/08/18 0858    famotidine (PEPCID) tablet 20 mg, 20 mg, Oral, Daily, Anant Bullard MD, 20 mg at 02/08/18 0858    hydrALAZINE (APRESOLINE) injection 5 mg, 5 mg, Intravenous, Q6H PRN, Jonny Hdez PA-C    HYDROmorphone (DILAUDID) injection 0 5 mg, 0 5 mg, Intravenous, Q4H PRN, Anant Bullard MD    morphine (PF) 4 mg/mL injection 4 mg, 4 mg, Intravenous, Once, Ahmet Norton DO    ondansetron [facility]) injection 4 mg, 4 mg, Intravenous, Q6H PRN, Gary Morrison MD    oxyCODONE (ROXICODONE) oral solution 10 mg, 10 mg, Oral, Q6H PRN, Anant ChallengeMD ham[place]    oxyCODONE (ROXICODONE) oral solution 5 mg, 5 mg, Oral, Q4H PRN, Martin Magaña MD, 5 mg at 02/08/18 2008    polyethylene glycol (MIRALAX) packet 17 g, 17 g, Oral, Daily, Martin Magaña MD, 17 g at 02/08/18 0858    senna (SENOKOT) tablet 8 6 mg, 1 tablet, Oral, Daily, Martin Magaña MD, 8 6 mg at 02/08/18 3434      Physical Exam:     GEN: alert and oriented x 3    RESP: breathing comfortably with no accessory muscle use    CARDIO: Regular rate and rhythm or S1S2 present  ABD: mildly distended   SKIN: jaundice  EXT: no significant peripheral edema   DRAINS: sanguineous      Assessment:   Left hydronephrosis secondary to large abdominal tumor s/p left PCN placement 2/6/2018    Plan:   -creatinine WNL 1 04  -maintain nephrostomy tube to drainage  -patient will need follow up with urology as outpatient in 1 month to discuss long term management and possible conversion to left ureteral stent  -urology signing off, please contact with any issues

## 2018-02-08 NOTE — PROGRESS NOTES
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

## 2018-02-08 NOTE — SOCIAL WORK
Cm received call from bedside nurse Daniel Thornton  Pt's spouse requesting a BSC and Rw for pt  Order signed by Dr Jimmy Hodge and sent to [facility]

## 2018-02-09 ENCOUNTER — APPOINTMENT (INPATIENT)
Dept: RADIOLOGY | Facility: HOSPITAL | Age: 73
DRG: 435 | End: 2018-02-09
Attending: INTERNAL MEDICINE
Payer: COMMERCIAL

## 2018-02-09 LAB
ALBUMIN SERPL BCP-MCNC: 1.4 G/DL (ref 3.5–5)
ALP SERPL-CCNC: 329 U/L (ref 46–116)
ALT SERPL W P-5'-P-CCNC: 65 U/L (ref 12–78)
ANION GAP SERPL CALCULATED.3IONS-SCNC: 9 MMOL/L (ref 4–13)
AST SERPL W P-5'-P-CCNC: 80 U/L (ref 5–45)
BILIRUB SERPL-MCNC: 15.32 MG/DL (ref 0.2–1)
BUN SERPL-MCNC: 22 MG/DL (ref 5–25)
CALCIUM SERPL-MCNC: 8.3 MG/DL (ref 8.3–10.1)
CHLORIDE SERPL-SCNC: 108 MMOL/L (ref 100–108)
CO2 SERPL-SCNC: 23 MMOL/L (ref 21–32)
CREAT SERPL-MCNC: 1.09 MG/DL (ref 0.6–1.3)
GFR SERPL CREATININE-BSD FRML MDRD: 68 ML/MIN/1.73SQ M
GLUCOSE SERPL-MCNC: 112 MG/DL (ref 65–140)
POTASSIUM SERPL-SCNC: 3.4 MMOL/L (ref 3.5–5.3)
PROT SERPL-MCNC: 6.6 G/DL (ref 6.4–8.2)
SODIUM SERPL-SCNC: 140 MMOL/L (ref 136–145)

## 2018-02-09 PROCEDURE — 99232 SBSQ HOSP IP/OBS MODERATE 35: CPT | Performed by: PHYSICIAN ASSISTANT

## 2018-02-09 PROCEDURE — 47531 INJECTION FOR CHOLANGIOGRAM: CPT | Performed by: RADIOLOGY

## 2018-02-09 PROCEDURE — 47531 INJECTION FOR CHOLANGIOGRAM: CPT

## 2018-02-09 PROCEDURE — 80053 COMPREHEN METABOLIC PANEL: CPT | Performed by: PHYSICIAN ASSISTANT

## 2018-02-09 RX ORDER — POTASSIUM CHLORIDE 20 MEQ/1
20 TABLET, EXTENDED RELEASE ORAL ONCE
Status: COMPLETED | OUTPATIENT
Start: 2018-02-09 | End: 2018-02-09

## 2018-02-09 RX ADMIN — POTASSIUM CHLORIDE 20 MEQ: 1500 TABLET, EXTENDED RELEASE ORAL at 11:55

## 2018-02-09 RX ADMIN — FAMOTIDINE 20 MG: 20 TABLET, FILM COATED ORAL at 08:09

## 2018-02-09 RX ADMIN — OXYCODONE HYDROCHLORIDE 5 MG: 5 SOLUTION ORAL at 17:01

## 2018-02-09 RX ADMIN — IOHEXOL 10 ML: 300 INJECTION, SOLUTION INTRAVENOUS at 16:19

## 2018-02-09 RX ADMIN — OXYCODONE HYDROCHLORIDE 5 MG: 5 SOLUTION ORAL at 00:20

## 2018-02-09 RX ADMIN — OXYCODONE HYDROCHLORIDE 5 MG: 5 SOLUTION ORAL at 12:05

## 2018-02-09 RX ADMIN — ENOXAPARIN SODIUM 40 MG: 40 INJECTION SUBCUTANEOUS at 08:09

## 2018-02-09 NOTE — ASSESSMENT & PLAN NOTE
· Bx from mass near liver came positive for adenocarcinoma suspicious of pancreatic or biliary origin  · Stage IV biliary/pancreatic CA  · CA 19-9 = 7000  · Appreciate Oncology input - pt needs to f/u with Dr Chuck Pallas on Monday, needs port-a-cath  If he stays beyond tomorrow, oncology rec inpatient port-a-cath    Patient arranging walker & bedside commode at home today   DC tomorrow

## 2018-02-09 NOTE — ASSESSMENT & PLAN NOTE
· Lt sided tortuous ureter but no clear obstruction  · Suspected 2/2 infiltrative tumour causing extrinsic compression  · S/p perc neprhostomy on left  · Urology rec f/u in 1 month to discuss possible conversion to ureteral stent  · Status post left-sided percutaneous nephrostomy placed on 02/06/2018

## 2018-02-09 NOTE — ASSESSMENT & PLAN NOTE
· Bx from mass near liver resulted positive for adenocarcinoma suspicious of pancreatic or biliary origin  · Stage IV biliary/pancreatic CA  · CA 19-9 = 7000  · Appreciate Oncology input - pt needs to f/u with Dr Warner Piedra on Monday, needs port-a-cath    · D/w IR, they will be unable to Insert port-a-cath today if he stays, if he stays will place order for port placement for Monday and NPO at [facility] prior

## 2018-02-09 NOTE — ASSESSMENT & PLAN NOTE
· Lt sided tortuous ureter but no clear obstruction  · Suspected 2" infiltrative tumour causing extrinsic compression  · S/p perc neprhostomy on left  · Urology rec f/u in 1 month to discuss possible conversion to ureteral stent  · Status post left-sided percutaneous nephrostomy placed on 02/06/2018

## 2018-02-09 NOTE — PROGRESS NOTES
Progress Note - Malcolm Machado 1945, 71 y o  male MRN: [id number]    Unit/Bed#: -01 Encounter: [id number]    Primary Care Provider: Shahrzad Carlson DO   Date and time admitted to hospital: 1/31/2018  7:35 PM        Jaundice   Assessment & Plan    · Painless jaundice, mainly obstructive + some infiltrative component 2/2 tumor masses  · Bilirubin slight better to 13 from 14  · Obstruction secondary to mass  · S/p 2 percutaneous biliary drains placed on 2/6/18 - draining well, but has pain  · Pain control with oxycodone  · DC home with VNA arranged, girlfriend lives with him        Metastatic adenocarcinoma ([facility] [address] )   Assessment & Plan    · Bx from mass near liver came positive for adenocarcinoma suspicious of pancreatic or biliary origin  · Stage IV biliary/pancreatic CA  · CA 19-9 = 7000  · Appreciate Oncology input - pt needs to f/u with Dr Mason Castro on Monday, needs port-a-cath  If he stays beyond tomorrow, oncology rec inpatient port-a-cath    Patient arranging walker & bedside commode at home today   DC tomorrow        Other hydronephrosis   Assessment & Plan    · Lt sided tortuous ureter but no clear obstruction  · Suspected 2" infiltrative tumour causing extrinsic compression  · S/p perc neprhostomy on left  · Urology rec f/u in 1 month to discuss possible conversion to ureteral stent  · Status post left-sided percutaneous nephrostomy placed on 02/06/2018        Weight loss, non-intentional   Assessment & Plan    2/2 malignancy            [facility] Internal Medicine Progress Note  Patient: Malcolm Machado 71 y o  male   MRN: [id number]  PCP: Shahrzad Carlson DO  Unit/Bed#: -30 Encounter: [id number]  Date Of Visit: 02/08/18    Assessment:    Principal Problem:    Liver mass  Active Problems:    Jaundice    Abnormal LFTs    Other hydronephrosis    Metastatic adenocarcinoma (HCC)    Chronic low back pain    Chronic obstructive pulmonary disease (HCC)    Fatigue    Abnormal CT of the abdomen    Weight loss, non-intentional    UTI (urinary tract infection)        VTE Pharmacologic Prophylaxis:   Pharmacologic: Enoxaparin (Lovenox)  Mechanical VTE Prophylaxis in Place: Yes    Patient Centered Rounds: I have performed bedside rounds with nursing staff today  Discussions with Specialists or Other Care Team Provider: oncology    Education and Discussions with Family / Patient: patient GF    Time Spent for Care: 45 minutes  More than 50% of total time spent on counseling and coordination of care as described above  Current Length of Stay: 8 day(s)    Current Patient Status: Inpatient   Certification Statement: The patient will continue to require additional inpatient hospital stay due to DC tomorrow    Discharge Plan / Estimated Discharge Date: tomorrow    Code Status: Level 1 - Full Code      Subjective:   Still has pain, no N/v, no Cp/SOB    Objective:     Vitals:   Temp (24hrs), Av °F (36 7 °C), Min:97 7 °F (36 5 °C), Max:98 3 °F (36 8 °C)    HR:  [69-72] 69  Resp:  [16-18] 18  BP: (124-132)/(61-73) 132/70  SpO2:  [94 %-99 %] 99 %  Body mass index is 19 04 kg/m²  Input and Output Summary (last 24 hours): Intake/Output Summary (Last 24 hours) at 18 1911  Last data filed at 18 1825   Gross per 24 hour   Intake              280 ml   Output             1125 ml   Net             -845 ml       Physical Exam:     Physical Exam   Constitutional: He is oriented to person, place, and time  He appears well-developed and well-nourished  HENT:   Head: Normocephalic and atraumatic  Eyes: Conjunctivae are normal  Scleral icterus is present  Cardiovascular: Normal rate, regular rhythm and normal heart sounds  Exam reveals no gallop and no friction rub  No murmur heard  Pulmonary/Chest: Effort normal and breath sounds normal    Abdominal: Soft  He exhibits no distension  There is no tenderness  Musculoskeletal: He exhibits no edema     Neurological: He is alert and oriented to person, place, and time  Additional Data:     Labs:      Results from last 7 days  Lab Units 02/07/18  0512   WBC Thousand/uL 16 22*   HEMOGLOBIN g/dL 8 9*   HEMATOCRIT % 26 0*   PLATELETS Thousands/uL 260   NEUTROS PCT % 82*   LYMPHS PCT % 9*   MONOS PCT % 9   EOS PCT % 0       Results from last 7 days  Lab Units 02/08/18  0501   SODIUM mmol/L 140   POTASSIUM mmol/L 3 5   CHLORIDE mmol/L 109*   CO2 mmol/L 22   BUN mg/dL 23   CREATININE mg/dL 1 04   CALCIUM mg/dL 8 1*   TOTAL PROTEIN g/dL 6 0*   BILIRUBIN TOTAL mg/dL 13 71*   ALK PHOS U/L 329*   ALT U/L 61   AST U/L 77*   GLUCOSE RANDOM mg/dL 98       Results from last 7 days  Lab Units 02/02/18  0900   INR  1 52*       * I Have Reviewed All Lab Data Listed Above  * Additional Pertinent Lab Tests Reviewed: All Labs Within Last 24 Hours Reviewed    Imaging:    Imaging Reports Reviewed Today Include:   Imaging Personally Reviewed by Myself Includes:      Recent Cultures (last 7 days):           Last 24 Hours Medication List:     Current Facility-Administered Medications:  acetaminophen 650 mg Oral Q6H PRN Joanie Borden MD   albuterol 2 puff Inhalation Q4H PRN Joanie Borden MD   docusate sodium 100 mg Oral BID Joanie Borden MD   enoxaparin 40 mg Subcutaneous Daily Joanie Borden MD   famotidine 20 mg Oral Daily Zabrina Hay MD   hydrALAZINE 5 mg Intravenous Q6H PRN Jonny Hdez PA-C   HYDROmorphone 0 5 mg Intravenous Q4H PRN Zabrina Hay MD   morphine injection 4 mg Intravenous Once Olayinka Meeks DO   ondansetron 4 mg Intravenous Q6H PRN Joanie Borden MD   oxyCODONE 10 mg Oral Q6H PRN Pepper Ray MD   oxyCODONE 5 mg Oral Q4H PRN Joanie Borden MD   polyethylene glycol 17 g Oral Daily Joanie Borden MD   senna 1 tablet Oral Daily Joanie Borden MD        Today, Patient Was Seen By: Pepper Ray MD    ** Please Note: This note has been constructed using a voice recognition system   **

## 2018-02-09 NOTE — PROGRESS NOTES
Pt taken to IR  With transport    Rounded earlier with [facility] practioner discussed plan of care with pt

## 2018-02-09 NOTE — ASSESSMENT & PLAN NOTE
· Painless jaundice, mainly obstructive + some infiltrative component 2/2 tumor masses  · Bilirubin slight better to 13 from 14  · Obstruction secondary to mass  · S/p 2 percutaneous biliary drains placed on 2/6/18 - draining well, but has pain  · Pain control with oxycodone  · DC home with VNA arranged, girlfriend lives with him

## 2018-02-09 NOTE — PROGRESS NOTES
Progress Note - Nhi Starch 1945, 71 y o  male MRN: [id number]  Unit/Bed#: -01 Encounter: [id number]  Primary Care Provider: Tiffany Thompson DO   Date and time admitted to hospital: 1/31/2018  7:35 PM    * Jaundice   Assessment & Plan    · Painless jaundice, mainly obstructive + some infiltrative component 2/2 extrinsic tumor/masses  · Bilirubin trending upward to 15, has not improved since placement of drains  · Obstruction secondary to mass s/p biopsy 2/2  · S/p 2 percutaneous biliary drains placed on 2/6/18 - draining well per patient ? less drainage noted in tube #1  · D/w GI today, they will re-eval  · Placed IR c/s for tube check   · Pain control with oxycodone  · DC home with VNA arranged, girlfriend lives with him        Metastatic adenocarcinoma ([facility] [address] )   Assessment & Plan    · Bx from mass near liver resulted positive for adenocarcinoma suspicious of pancreatic or biliary origin  · Stage IV biliary/pancreatic CA  · CA 19-9 = 7000  · Appreciate Oncology input - pt needs to f/u with Dr Michael Del Valle on Monday, needs port-a-cath    · D/w IR, they will be unable to Insert port-a-cath today if he stays, if he stays will place order for port placement for Monday and NPO at [facility] prior        Other hydronephrosis   Assessment & Plan    · Lt sided tortuous ureter but no clear obstruction  · Suspected 2/2 infiltrative tumour causing extrinsic compression  · S/p perc neprhostomy on left  · Urology rec f/u in 1 month to discuss possible conversion to ureteral stent  · Status post left-sided percutaneous nephrostomy placed on 02/06/2018        Weight loss, non-intentional   Assessment & Plan    · Secondary to above  · Nutritionist consult appreciated, ensure clear tid ordered        Chronic obstructive pulmonary disease (HCC)   Assessment & Plan    · Continue albuterol  · Respiratory protocol          VTE Pharmacologic Prophylaxis:   Pharmacologic: Enoxaparin (Lovenox)  Mechanical VTE Prophylaxis in Place: Yes    Patient Centered Rounds: I have performed bedside rounds with nursing staff today  Discussions with Specialists or Other Care Team Provider: nursing, IR, GI PALOU     Education and Discussions with Family / Patient: patient, girlfriend at bedside     Time Spent for Care: 30 minutes  More than 50% of total time spent on counseling and coordination of care as described above  Current Length of Stay: 9 day(s)    Current Patient Status: Inpatient   Certification Statement: The patient will continue to require additional inpatient hospital stay due to GI evaluation today, worsening LFTs, monitor drainage of perc jonny tubes, possible IR tube check     Discharge Plan / Estimated Discharge Date: pending, 24-48 hours     Code Status: Level 1 - Full Code      Subjective:   Pt seen and examined at bedside  States he has to have BM  reports good drainage from jonny tubes  No chest pain or sob  Appetite only OK  Still with abdominal pain RUQ  Objective:     Vitals:   Temp (24hrs), Av 6 °F (37 °C), Min:98 3 °F (36 8 °C), Max:99 °F (37 2 °C)    HR:  [69-72] 72  Resp:  [18-20] 20  BP: (132-137)/(69-71) 137/71  SpO2:  [95 %-99 %] 97 %  Body mass index is 19 04 kg/m²  Input and Output Summary (last 24 hours): Intake/Output Summary (Last 24 hours) at 18 1147  Last data filed at 18 0756   Gross per 24 hour   Intake              100 ml   Output             1400 ml   Net            -1300 ml       Physical Exam:     Physical Exam   Constitutional: He is oriented to person, place, and time  He appears well-developed  Thin and frail, appears chronically ill    HENT:   Head: Normocephalic and atraumatic  Eyes: EOM are normal  Scleral icterus is present  Neck: Normal range of motion  Neck supple  Cardiovascular: Normal rate, regular rhythm and normal heart sounds  No murmur heard  Abdominal: Soft  Bowel sounds are normal  He exhibits no distension  There is no tenderness     L perc jonny tube w/ minimal drainage and R perc jonny tube with green biliary drainage    Genitourinary:   Genitourinary Comments: L nephrostomy tube    Musculoskeletal: Normal range of motion  He exhibits no edema  Observed ambulating with walker   Neurological: He is alert and oriented to person, place, and time  Skin: Skin is warm and dry  Jaundiced skin    Psychiatric: He has a normal mood and affect  Additional Data:     Labs:      Results from last 7 days  Lab Units 02/07/18  0512   WBC Thousand/uL 16 22*   HEMOGLOBIN g/dL 8 9*   HEMATOCRIT % 26 0*   PLATELETS Thousands/uL 260   NEUTROS PCT % 82*   LYMPHS PCT % 9*   MONOS PCT % 9   EOS PCT % 0       Results from last 7 days  Lab Units 02/09/18  1034   SODIUM mmol/L 140   POTASSIUM mmol/L 3 4*   CHLORIDE mmol/L 108   CO2 mmol/L 23   BUN mg/dL 22   CREATININE mg/dL 1 09   CALCIUM mg/dL 8 3   TOTAL PROTEIN g/dL 6 6   BILIRUBIN TOTAL mg/dL 15 32*   ALK PHOS U/L 329*   ALT U/L 65   AST U/L 80*   GLUCOSE RANDOM mg/dL 112           * I Have Reviewed All Lab Data Listed Above  * Additional Pertinent Lab Tests Reviewed:  [address] Admission Reviewed    Imaging:    Imaging Reports Reviewed Today Include: CT abd/pelv  Imaging Personally Reviewed by Myself Includes:  none    Recent Cultures (last 7 days):           Last 24 Hours Medication List:     Current Facility-Administered Medications:  acetaminophen 650 mg Oral Q6H PRN Ileana Morfin MD   albuterol 2 puff Inhalation Q4H PRN Ileana Morfin MD   docusate sodium 100 mg Oral BID Ileana Morfin MD   enoxaparin 40 mg Subcutaneous Daily Ileana Morfin MD   famotidine 20 mg Oral Daily Zabrina Hay MD   hydrALAZINE 5 mg Intravenous Q6H PRN Jonny Hdez PA-C   HYDROmorphone 0 5 mg Intravenous Q4H PRN Carley Moore MD   morphine injection 4 mg Intravenous Once Darvin Cassette, DO   ondansetron 4 mg Intravenous Q6H PRN Ileana Morfin MD   oxyCODONE 10 mg Oral Q6H PRN Zabrina KAISER MD Satnam   oxyCODONE 5 mg Oral Q4H PRN Li Lozano MD   polyethylene glycol 17 g Oral Daily Li Lozano MD   potassium chloride 20 mEq Oral Once Mark Arriaga PA-C   senna 1 tablet Oral Daily Li Lozano MD        Today, Patient Was Seen By: Mark Arriaga PA-C    ** Please Note: This note has been constructed using a voice recognition system   **

## 2018-02-09 NOTE — PLAN OF CARE
Problem: PAIN - ADULT  Goal: Verbalizes/displays adequate comfort level or baseline comfort level  Interventions:  - Encourage patient to monitor pain and request assistance  - Assess pain using appropriate pain scale  - Administer analgesics based on type and severity of pain and evaluate response  - Implement non-pharmacological measures as appropriate and evaluate response  - Consider cultural and social influences on pain and pain management  - Notify physician/advanced practitioner if interventions unsuccessful or patient reports new pain   Outcome: Progressing      Problem: INFECTION - ADULT  Goal: Absence or prevention of progression during hospitalization  INTERVENTIONS:  - Assess and monitor for signs and symptoms of infection  - Monitor lab/diagnostic results  - Monitor all insertion sites, i e  indwelling lines, tubes, and drains  - Monitor endotracheal (as able) and nasal secretions for changes in amount and color  - [place] appropriate cooling/warming therapies per order  - Administer medications as ordered  - Instruct and encourage patient and family to use good hand hygiene technique  - Identify and instruct in appropriate isolation precautions for identified infection/condition   Outcome: Progressing      Problem: SAFETY ADULT  Goal: Patient will remain free of falls  INTERVENTIONS:  - Assess patient frequently for physical needs  -  Identify cognitive and physical deficits and behaviors that affect risk of falls    -  [place] fall precautions as indicated by assessment   - Educate patient/family on patient safety including physical limitations  - Instruct patient to call for assistance with activity based on assessment  - Modify environment to reduce risk of injury  - Consider OT/PT consult to assist with strengthening/mobility    Outcome: Progressing    Goal: Maintain or return to baseline ADL function  INTERVENTIONS:  -  Assess patient's ability to carry out ADLs; assess patient's baseline for ADL function and identify physical deficits which impact ability to perform ADLs (bathing, care of mouth/teeth, toileting, grooming, dressing, etc )  - Assess/evaluate cause of self-care deficits   - Assess range of motion  - Assess patient's mobility; develop plan if impaired  - Assess patient's need for assistive devices and provide as appropriate  - Encourage maximum independence but intervene and supervise when necessary  ¯ Involve family in performance of ADLs  ¯ Assess for home care needs following discharge   ¯ Request OT consult to assist with ADL evaluation and planning for discharge  ¯ Provide patient education as appropriate   Outcome: Progressing    Goal: Maintain or return mobility status to optimal level  INTERVENTIONS:  - Assess patient's baseline mobility status (ambulation, transfers, stairs, etc )    - Identify cognitive and physical deficits and behaviors that affect mobility  - Identify mobility aids required to assist with transfers and/or ambulation (gait belt, sit-to-stand, lift, walker, cane, etc )  - [place] fall precautions as indicated by assessment  - Record patient progress and toleration of activity level on Mobility SBAR; progress patient to next Phase/Stage  - Instruct patient to call for assistance with activity based on assessment  - Request Rehabilitation consult to assist with strengthening/weightbearing, etc    Outcome: Progressing      Problem: DISCHARGE PLANNING  Goal: Discharge to home or other facility with appropriate resources  INTERVENTIONS:  - Identify barriers to discharge w/patient and caregiver  - Arrange for needed discharge resources and transportation as appropriate  - Identify discharge learning needs (meds, wound care, etc )  - Arrange for interpretive services to assist at discharge as needed  - Refer to Case Management Department for coordinating discharge planning if the patient needs post-hospital services based on physician/advanced practitioner order or complex needs related to functional status, cognitive ability, or social support system   Outcome: Progressing      Problem: Nutrition/Hydration-ADULT  Goal: Nutrient/Hydration intake appropriate for improving, restoring or maintaining nutritional needs  Monitor and assess patient's nutrition/hydration status for malnutrition (ex- brittle hair, bruises, dry skin, pale skin and conjunctiva, muscle wasting, smooth red tongue, and disorientation)  Collaborate with interdisciplinary team and initiate plan and interventions as ordered  Monitor patient's weight and dietary intake as ordered or per policy  Utilize nutrition screening tool and intervene per policy  Determine patient's food preferences and provide high-protein, high-caloric foods as appropriate  INTERVENTIONS:  - Monitor oral intake, urinary output, labs, and treatment plans  - Assess nutrition and hydration status and recommend course of action  - Evaluate amount of meals eaten  - Assist patient with eating if necessary   - Allow adequate time for meals  - Recommend/ encourage appropriate diets, oral nutritional supplements, and vitamin/mineral supplements  - Order, calculate, and assess calorie counts as needed  - Recommend, monitor, and adjust tube feedings and TPN/PPN based on assessed needs  - Assess need for intravenous fluids  - Provide specific nutrition/hydration education as appropriate  - Include patient/family/caregiver in decisions related to nutrition    Outcome: Progressing      Problem: Potential for Falls  Goal: Patient will remain free of falls  INTERVENTIONS:  - Assess patient frequently for physical needs  -  Identify cognitive and physical deficits and behaviors that affect risk of falls    -  [place] fall precautions as indicated by assessment   - Educate patient/family on patient safety including physical limitations  - Instruct patient to call for assistance with activity based on assessment  - Modify environment to reduce risk of injury  - Consider OT/PT consult to assist with strengthening/mobility    Outcome: Progressing      Problem: Prexisting or High Potential for Compromised Skin Integrity  Goal: Skin integrity is maintained or improved  INTERVENTIONS:  - Identify patients at risk for skin breakdown  - Assess and monitor skin integrity  - Assess and monitor nutrition and hydration status  - Monitor labs (i e  albumin)  - Assess for incontinence   - Turn and reposition patient  - Assist with mobility/ambulation  - Relieve pressure over bony prominences  - Avoid friction and shearing  - Provide appropriate hygiene as needed including keeping skin clean and dry  - Evaluate need for skin moisturizer/barrier cream  - Collaborate with interdisciplinary team (i e  Nutrition, Rehabilitation, etc )   - Patient/family teaching   Outcome: Progressing

## 2018-02-09 NOTE — ASSESSMENT & PLAN NOTE
· Painless jaundice, mainly obstructive + some infiltrative component 2/2 extrinsic tumor/masses  · Bilirubin trending upward to 15, has not improved since placement of drains  · Obstruction secondary to mass s/p biopsy 2/2  · S/p 2 percutaneous biliary drains placed on 2/6/18 - draining well per patient ? less drainage noted in tube #1  · D/w GI today, they will re-eval  · Placed IR c/s for tube check   · Pain control with oxycodone  · DC home with VNA arranged, girlfriend lives with him

## 2018-02-10 ENCOUNTER — APPOINTMENT (INPATIENT)
Dept: RADIOLOGY | Facility: HOSPITAL | Age: 73
DRG: 435 | End: 2018-02-10
Payer: COMMERCIAL

## 2018-02-10 LAB
ACANTHOCYTES BLD QL SMEAR: PRESENT
ALBUMIN SERPL BCP-MCNC: 1.4 G/DL (ref 3.5–5)
ALP SERPL-CCNC: 309 U/L (ref 46–116)
ALT SERPL W P-5'-P-CCNC: 62 U/L (ref 12–78)
AMMONIA PLAS-SCNC: 22 UMOL/L (ref 11–35)
ANION GAP SERPL CALCULATED.3IONS-SCNC: 8 MMOL/L (ref 4–13)
ANISOCYTOSIS BLD QL SMEAR: PRESENT
AST SERPL W P-5'-P-CCNC: 74 U/L (ref 5–45)
BASOPHILS # BLD MANUAL: 0 THOUSAND/UL (ref 0–0.1)
BASOPHILS NFR MAR MANUAL: 0 % (ref 0–1)
BILIRUB SERPL-MCNC: 14.64 MG/DL (ref 0.2–1)
BUN SERPL-MCNC: 22 MG/DL (ref 5–25)
BURR CELLS BLD QL SMEAR: PRESENT
CALCIUM SERPL-MCNC: 8.4 MG/DL (ref 8.3–10.1)
CHLORIDE SERPL-SCNC: 109 MMOL/L (ref 100–108)
CO2 SERPL-SCNC: 23 MMOL/L (ref 21–32)
CREAT SERPL-MCNC: 1.05 MG/DL (ref 0.6–1.3)
EOSINOPHIL # BLD MANUAL: 0 THOUSAND/UL (ref 0–0.4)
EOSINOPHIL NFR BLD MANUAL: 0 % (ref 0–6)
ERYTHROCYTE [DISTWIDTH] IN BLOOD BY AUTOMATED COUNT: 21.8 % (ref 11.6–15.1)
GFR SERPL CREATININE-BSD FRML MDRD: 71 ML/MIN/1.73SQ M
GLUCOSE SERPL-MCNC: 102 MG/DL (ref 65–140)
HCT VFR BLD AUTO: 27.4 % (ref 36.5–49.3)
HGB BLD-MCNC: 9.3 G/DL (ref 12–17)
LACTATE SERPL-SCNC: 3.7 MMOL/L (ref 0.5–2)
LYMPHOCYTES # BLD AUTO: 0.41 THOUSAND/UL (ref 0.6–4.47)
LYMPHOCYTES # BLD AUTO: 3 % (ref 14–44)
MCH RBC QN AUTO: 30.4 PG (ref 26.8–34.3)
MCHC RBC AUTO-ENTMCNC: 33.9 G/DL (ref 31.4–37.4)
MCV RBC AUTO: 90 FL (ref 82–98)
METAMYELOCYTES NFR BLD MANUAL: 2 % (ref 0–1)
MONOCYTES # BLD AUTO: 0.97 THOUSAND/UL (ref 0–1.22)
MONOCYTES NFR BLD: 7 % (ref 4–12)
NEUTROPHILS # BLD MANUAL: 12.14 THOUSAND/UL (ref 1.85–7.62)
NEUTS BAND NFR BLD MANUAL: 16 % (ref 0–8)
NEUTS SEG NFR BLD AUTO: 72 % (ref 43–75)
NRBC BLD AUTO-RTO: 0 /100 WBCS
PLATELET # BLD AUTO: 307 THOUSANDS/UL (ref 149–390)
PLATELET BLD QL SMEAR: ADEQUATE
PMV BLD AUTO: 10.8 FL (ref 8.9–12.7)
POIKILOCYTOSIS BLD QL SMEAR: PRESENT
POTASSIUM SERPL-SCNC: 3.7 MMOL/L (ref 3.5–5.3)
PROT SERPL-MCNC: 6.5 G/DL (ref 6.4–8.2)
RBC # BLD AUTO: 3.06 MILLION/UL (ref 3.88–5.62)
RBC MORPH BLD: PRESENT
SODIUM SERPL-SCNC: 140 MMOL/L (ref 136–145)
TARGETS BLD QL SMEAR: PRESENT
WBC # BLD AUTO: 13.8 THOUSAND/UL (ref 4.31–10.16)

## 2018-02-10 PROCEDURE — 80053 COMPREHEN METABOLIC PANEL: CPT | Performed by: PHYSICIAN ASSISTANT

## 2018-02-10 PROCEDURE — 74177 CT ABD & PELVIS W/CONTRAST: CPT

## 2018-02-10 PROCEDURE — 85027 COMPLETE CBC AUTOMATED: CPT | Performed by: PHYSICIAN ASSISTANT

## 2018-02-10 PROCEDURE — 74018 RADEX ABDOMEN 1 VIEW: CPT

## 2018-02-10 PROCEDURE — 82140 ASSAY OF AMMONIA: CPT | Performed by: PHYSICIAN ASSISTANT

## 2018-02-10 PROCEDURE — 85007 BL SMEAR W/DIFF WBC COUNT: CPT | Performed by: PHYSICIAN ASSISTANT

## 2018-02-10 PROCEDURE — 83605 ASSAY OF LACTIC ACID: CPT | Performed by: PHYSICIAN ASSISTANT

## 2018-02-10 PROCEDURE — 99232 SBSQ HOSP IP/OBS MODERATE 35: CPT | Performed by: PHYSICIAN ASSISTANT

## 2018-02-10 RX ORDER — OXYCODONE HCL 5 MG/5 ML
5 SOLUTION, ORAL ORAL EVERY 6 HOURS PRN
Status: DISCONTINUED | OUTPATIENT
Start: 2018-02-10 | End: 2018-02-12

## 2018-02-10 RX ORDER — ACETAMINOPHEN 325 MG/1
650 TABLET ORAL EVERY 6 HOURS PRN
Status: DISCONTINUED | OUTPATIENT
Start: 2018-02-10 | End: 2018-02-11

## 2018-02-10 RX ORDER — OXYCODONE HCL 5 MG/5 ML
2.5 SOLUTION, ORAL ORAL EVERY 4 HOURS PRN
Status: DISCONTINUED | OUTPATIENT
Start: 2018-02-10 | End: 2018-02-12

## 2018-02-10 RX ORDER — SODIUM CHLORIDE 9 MG/ML
125 INJECTION, SOLUTION INTRAVENOUS CONTINUOUS
Status: DISPENSED | OUTPATIENT
Start: 2018-02-10 | End: 2018-02-11

## 2018-02-10 RX ORDER — SIMETHICONE 80 MG
80 TABLET,CHEWABLE ORAL EVERY 6 HOURS PRN
Status: DISCONTINUED | OUTPATIENT
Start: 2018-02-10 | End: 2018-02-12

## 2018-02-10 RX ORDER — ACETAMINOPHEN 325 MG/1
975 TABLET ORAL EVERY 8 HOURS SCHEDULED
Status: DISCONTINUED | OUTPATIENT
Start: 2018-02-10 | End: 2018-02-10

## 2018-02-10 RX ADMIN — SODIUM CHLORIDE 75 ML/HR: 0.9 INJECTION, SOLUTION INTRAVENOUS at 21:03

## 2018-02-10 RX ADMIN — OXYCODONE HYDROCHLORIDE 10 MG: 5 SOLUTION ORAL at 12:16

## 2018-02-10 RX ADMIN — FAMOTIDINE 20 MG: 20 TABLET, FILM COATED ORAL at 08:36

## 2018-02-10 RX ADMIN — OXYCODONE HYDROCHLORIDE 10 MG: 5 SOLUTION ORAL at 18:02

## 2018-02-10 RX ADMIN — DOCUSATE SODIUM 100 MG: 100 CAPSULE, LIQUID FILLED ORAL at 17:29

## 2018-02-10 RX ADMIN — ENOXAPARIN SODIUM 40 MG: 40 INJECTION SUBCUTANEOUS at 08:30

## 2018-02-10 RX ADMIN — OXYCODONE HYDROCHLORIDE 10 MG: 5 SOLUTION ORAL at 06:03

## 2018-02-10 RX ADMIN — SODIUM CHLORIDE 500 ML: 0.9 INJECTION, SOLUTION INTRAVENOUS at 23:08

## 2018-02-10 RX ADMIN — HYDROMORPHONE HYDROCHLORIDE 0.5 MG: 1 INJECTION, SOLUTION INTRAMUSCULAR; INTRAVENOUS; SUBCUTANEOUS at 08:56

## 2018-02-10 RX ADMIN — HYDROMORPHONE HYDROCHLORIDE 0.5 MG: 1 INJECTION, SOLUTION INTRAMUSCULAR; INTRAVENOUS; SUBCUTANEOUS at 20:19

## 2018-02-10 RX ADMIN — SIMETHICONE CHEW TAB 80 MG 80 MG: 80 TABLET ORAL at 09:03

## 2018-02-10 RX ADMIN — IOHEXOL 80 ML: 350 INJECTION, SOLUTION INTRAVENOUS at 22:36

## 2018-02-10 NOTE — ASSESSMENT & PLAN NOTE
· Patient complaining of gas pains and generalized abdominal pain this morning, tearful on exam   · Will order simethicone  · Continue oxy and dilaudid prn   · Check abdominal x ray now   · Continue bowel regimen - patient in bathroom many times yesterday unable to have a BM   · Continue pepcid   · LFTs stable

## 2018-02-10 NOTE — ASSESSMENT & PLAN NOTE
· Painless jaundice, mainly obstructive + some infiltrative component 2/2 extrinsic tumor/masses  · Bilirubin trending upward to 15 yesterday, has not improved since placement of drains  · Obstruction secondary to mass s/p biopsy 2/2/18  · S/p 2 percutaneous biliary drains placed on 2/6/18 - draining well per patient  · D/w GI in detail 2/9, unfortunately not much else they can do from GI standpoint, will need OP onc follow up  · S/p IR tube check 2/9/18 - patent  · Pain control with oxycodone - will provide Rx   · DC home with VNA arranged, girlfriend lives with him

## 2018-02-10 NOTE — PROGRESS NOTES
Progress Note - Rakel Carr 1945, 71 y o  male MRN: [id number]  Unit/Bed#: -Bing Encounter: [id number]  Primary Care Provider: Maryanne Liz DO   Date and time admitted to hospital: 1/31/2018  7:35 PM    * Jaundice   Assessment & Plan    · Painless jaundice, mainly obstructive + some infiltrative component 2/2 extrinsic tumor/masses  · Bilirubin trending upward to 15 yesterday, has not improved since placement of drains  · Obstruction secondary to mass s/p biopsy 2/2/18  · S/p 2 percutaneous biliary drains placed on 2/6/18 - draining well per patient  · D/w GI in detail 2/9, unfortunately not much else they can do from GI standpoint, will need OP onc follow up  · S/p IR tube check 2/9/18 - patent  · Pain control with oxycodone - will provide Rx   · DC home with VNA arranged, girlfriend lives with him        Metastatic adenocarcinoma ([address] )   Assessment & Plan    · CT revealed 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 with involvement of upper abdominal organs including the tail of the pancreas, omentum, peritoneal margin, left para-aortic nodes and long segment of sigmoid colon   · Bx from mass near liver resulted positive for adenocarcinoma suspicious of pancreatic or biliary origin 02/02/2018  · Stage IV biliary/pancreatic CA  · CA 19-9 7317, CEA 4 1  · Appreciate Oncology input - pt needs to f/u with Dr Maria Eugenia Frey on Monday, needs port-a-cath    · D/w IR yesterday and they recommend this be arranged as outpatient         Other hydronephrosis   Assessment & Plan    · Lt sided tortuous ureter but no clear obstruction  · Suspected 2/2 infiltrative tumour causing extrinsic compression  · S/p perc neprhostomy on left  · Urology rec f/u in 1 month to discuss possible conversion to ureteral stent  · Status post left-sided percutaneous nephrostomy placed on 02/06/2018  · Patient will have visiting nurses        Weight loss, non-intentional   Assessment & Plan    · Secondary to above  · Nutritionist consult appreciated, ensure clear tid ordered        Chronic obstructive pulmonary disease (HCC)   Assessment & Plan    · Continue albuterol  · No acute exacerbation         Generalized abdominal pain   Assessment & Plan    · Patient complaining of gas pains and generalized abdominal pain this morning, tearful on exam   · Will order simethicone  · Continue oxy and dilaudid prn   · Check abdominal x ray now   · Continue bowel regimen - patient in bathroom many times yesterday unable to have a BM   · Continue pepcid   · LFTs stable           VTE Pharmacologic Prophylaxis:   Pharmacologic: Enoxaparin (Lovenox)  Mechanical VTE Prophylaxis in Place: Yes    Patient Centered Rounds: I have performed bedside rounds with nursing staff today  Discussions with Specialists or Other Care Team Provider: nursing     Education and Discussions with Family / Patient: patient, wife at bedside     Time Spent for Care: 30 minutes  More than 50% of total time spent on counseling and coordination of care as described above  Current Length of Stay: 10 day(s)    Current Patient Status: Inpatient   Certification Statement: The patient will continue to require additional inpatient hospital stay due to worsening abdominal pain, close monitoring of liver function and T bili    Discharge Plan / Estimated Discharge Date: pending, possible dc later today or tomorrow     Code Status: Level 1 - Full Code      Subjective:   Patient seen and examined at bedside  Tearful states he has gas pains worse in his lower abdomen  Unable to have a "normal BM" but did go  No n/v/d  Reports chills overnight  Objective:     Vitals:   Temp (24hrs), Av °F (36 7 °C), Min:97 7 °F (36 5 °C), Max:98 5 °F (36 9 °C)    HR:  [66-88] 88  Resp:  [20] 20  BP: (123-160)/(69-76) 160/75  SpO2:  [93 %-100 %] 95 %  Body mass index is 19 04 kg/m²  Input and Output Summary (last 24 hours):        Intake/Output Summary (Last 24 hours) at 02/10/18 1996  Last data filed at 02/10/18 1301   Gross per 24 hour   Intake              280 ml   Output              955 ml   Net             -675 ml       Physical Exam:     Physical Exam   Constitutional: He is oriented to person, place, and time  He appears well-developed  Chronically ill appearing    HENT:   Head: Normocephalic and atraumatic  Mouth/Throat: Oropharynx is clear and moist    Eyes: EOM are normal  Scleral icterus is present  Neck: Normal range of motion  Neck supple  Cardiovascular: Normal rate, regular rhythm and normal heart sounds  No murmur heard  Pulmonary/Chest: Effort normal and breath sounds normal    Abdominal: Soft  Bowel sounds are normal  He exhibits distension (mild )  There is tenderness (diffuse )  Perc jonny drains x 2    Genitourinary:   Genitourinary Comments: R nephrostomy drain  Edema of penis noted, +foreskin, no paraphimosis  Musculoskeletal: Normal range of motion  He exhibits no edema  Neurological: He is alert and oriented to person, place, and time  Skin: Skin is warm and dry  Jaundiced skin   Psychiatric: He has a normal mood and affect  Additional Data:     Labs:      Results from last 7 days  Lab Units 02/07/18  0512   WBC Thousand/uL 16 22*   HEMOGLOBIN g/dL 8 9*   HEMATOCRIT % 26 0*   PLATELETS Thousands/uL 260   NEUTROS PCT % 82*   LYMPHS PCT % 9*   MONOS PCT % 9   EOS PCT % 0       Results from last 7 days  Lab Units 02/10/18  0553   SODIUM mmol/L 140   POTASSIUM mmol/L 3 7   CHLORIDE mmol/L 109*   CO2 mmol/L 23   BUN mg/dL 22   CREATININE mg/dL 1 05   CALCIUM mg/dL 8 4   TOTAL PROTEIN g/dL 6 5   BILIRUBIN TOTAL mg/dL 14 64*   ALK PHOS U/L 309*   ALT U/L 62   AST U/L 74*   GLUCOSE RANDOM mg/dL 102           * I Have Reviewed All Lab Data Listed Above  * Additional Pertinent Lab Tests Reviewed:  All Labs For Current Hospital Admission Reviewed    Imaging:    Imaging Reports Reviewed Today Include: none   Imaging Personally Reviewed by Myself Includes:  None Recent Cultures (last 7 days):           Last 24 Hours Medication List:     Current Facility-Administered Medications:  acetaminophen 650 mg Oral Q6H PRN Thiago Fernandes MD   albuterol 2 puff Inhalation Q4H PRN Thiago Fernandes MD   docusate sodium 100 mg Oral BID Thiago Fernandes MD   enoxaparin 40 mg Subcutaneous Daily Thiago Fernandes MD   famotidine 20 mg Oral Daily Zabrina Hay MD   hydrALAZINE 5 mg Intravenous Q6H PRN Jonny Hdez PA-C   HYDROmorphone 0 5 mg Intravenous Q4H PRN Claudia Stockton MD   morphine injection 4 mg Intravenous Once Reggie Naidu DO   ondansetron 4 mg Intravenous Q6H PRN Thiago Fernandes MD   oxyCODONE 10 mg Oral Q6H PRN Claudia Stockton MD   oxyCODONE 5 mg Oral Q4H PRN Thiago Fernandes MD   polyethylene glycol 17 g Oral Daily Thiago Fernandes MD   senna 1 tablet Oral Daily Thiago Fernandes MD   simethicone 80 mg Oral Q6H PRN Magalis Haas PA-C        Today, Patient Was Seen By: Magalis Haas PA-C    ** Please Note: This note has been constructed using a voice recognition system   **

## 2018-02-10 NOTE — ASSESSMENT & PLAN NOTE
Follow up with eye doctor or immediate care if no improvement    Corneal Abrasion    You have received a scratch or scrape (abrasion) to your cornea. The cornea is the clear part in the front of the eye. This sensitive area is very painful when injured. · Lt sided tortuous ureter but no clear obstruction  · Suspected 2/2 infiltrative tumour causing extrinsic compression  · S/p perc neprhostomy on left  · Urology rec f/u in 1 month to discuss possible conversion to ureteral stent  · Status post left-sided percutaneous nephrostomy placed on 02/06/2018  · Patient will have visiting nurses Follow up with your healthcare provider, or as advised. · If no patch was put on your eye and the pain continues for more than 48 hours, you should have another exam. Contact your healthcare provider to arrange this.   · If your eye was patched and you wer

## 2018-02-10 NOTE — ASSESSMENT & PLAN NOTE
· CT revealed 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 with involvement of upper abdominal organs including the tail of the pancreas, omentum, peritoneal margin, left para-aortic nodes and long segment of sigmoid colon   · Bx from mass near liver resulted positive for adenocarcinoma suspicious of pancreatic or biliary origin 02/02/2018  · Stage IV biliary/pancreatic CA  · CA 19-9 7317, CEA 4 1  · Appreciate Oncology input - pt needs to f/u with Dr Omkar Lewis on Monday, needs port-a-cath    · D/w IR yesterday and they recommend this be arranged as outpatient

## 2018-02-11 ENCOUNTER — APPOINTMENT (INPATIENT)
Dept: RADIOLOGY | Facility: HOSPITAL | Age: 73
DRG: 435 | End: 2018-02-11
Payer: COMMERCIAL

## 2018-02-11 PROBLEM — D72.829 LEUKOCYTOSIS: Status: ACTIVE | Noted: 2018-02-11

## 2018-02-11 PROBLEM — Z71.89 GOALS OF CARE, COUNSELING/DISCUSSION: Status: ACTIVE | Noted: 2018-02-11

## 2018-02-11 PROBLEM — N17.9 AKI (ACUTE KIDNEY INJURY) (HCC): Status: ACTIVE | Noted: 2018-02-11

## 2018-02-11 PROBLEM — E87.2 LACTIC ACIDOSIS: Status: ACTIVE | Noted: 2018-02-11

## 2018-02-11 LAB
ABO GROUP BLD: NORMAL
ALBUMIN SERPL BCP-MCNC: 1.2 G/DL (ref 3.5–5)
ALP SERPL-CCNC: 255 U/L (ref 46–116)
ALT SERPL W P-5'-P-CCNC: 74 U/L (ref 12–78)
ANION GAP SERPL CALCULATED.3IONS-SCNC: 11 MMOL/L (ref 4–13)
ANION GAP SERPL CALCULATED.3IONS-SCNC: 11 MMOL/L (ref 4–13)
APTT PPP: 64 SECONDS (ref 23–35)
ARTERIAL PATENCY WRIST A: ABNORMAL
ARTERIAL PATENCY WRIST A: YES
AST SERPL W P-5'-P-CCNC: 118 U/L (ref 5–45)
BASE EXCESS BLDA CALC-SCNC: -12 MMOL/L (ref -2–3)
BASE EXCESS BLDA CALC-SCNC: -7.9 MMOL/L
BASOPHILS # BLD AUTO: 0.02 THOUSANDS/ΜL (ref 0–0.1)
BASOPHILS NFR BLD AUTO: 0 % (ref 0–1)
BILIRUB SERPL-MCNC: 15.01 MG/DL (ref 0.2–1)
BLD GP AB SCN SERPL QL: NEGATIVE
BUN SERPL-MCNC: 34 MG/DL (ref 5–25)
BUN SERPL-MCNC: 39 MG/DL (ref 5–25)
CA-I BLD-SCNC: 1.11 MMOL/L (ref 1.12–1.32)
CALCIUM SERPL-MCNC: 7.2 MG/DL (ref 8.3–10.1)
CALCIUM SERPL-MCNC: 8 MG/DL (ref 8.3–10.1)
CHLORIDE SERPL-SCNC: 109 MMOL/L (ref 100–108)
CHLORIDE SERPL-SCNC: 111 MMOL/L (ref 100–108)
CO2 SERPL-SCNC: 19 MMOL/L (ref 21–32)
CO2 SERPL-SCNC: 20 MMOL/L (ref 21–32)
CREAT SERPL-MCNC: 1.91 MG/DL (ref 0.6–1.3)
CREAT SERPL-MCNC: 2.06 MG/DL (ref 0.6–1.3)
DS:DELIVERY SYSTEM: ABNORMAL
EOSINOPHIL # BLD AUTO: 0 THOUSAND/ΜL (ref 0–0.61)
EOSINOPHIL NFR BLD AUTO: 0 % (ref 0–6)
ERYTHROCYTE [DISTWIDTH] IN BLOOD BY AUTOMATED COUNT: 23.5 % (ref 11.6–15.1)
ERYTHROCYTE [DISTWIDTH] IN BLOOD BY AUTOMATED COUNT: 24.7 % (ref 11.6–15.1)
FIO2 GAS DIL.REBREATH: 30 L
GFR SERPL CREATININE-BSD FRML MDRD: 31 ML/MIN/1.73SQ M
GFR SERPL CREATININE-BSD FRML MDRD: 34 ML/MIN/1.73SQ M
GLUCOSE SERPL-MCNC: 117 MG/DL (ref 65–140)
GLUCOSE SERPL-MCNC: 119 MG/DL (ref 65–140)
GLUCOSE SERPL-MCNC: 134 MG/DL (ref 65–140)
GLUCOSE SERPL-MCNC: 137 MG/DL (ref 65–140)
HCO3 BLDA-SCNC: 12.8 MMOL/L (ref 22–28)
HCO3 BLDA-SCNC: 17 MMOL/L (ref 22–28)
HCT VFR BLD AUTO: 22 % (ref 36.5–49.3)
HCT VFR BLD AUTO: 23.8 % (ref 36.5–49.3)
HCT VFR BLD AUTO: 26.6 % (ref 36.5–49.3)
HCT VFR BLD AUTO: 28.1 % (ref 36.5–49.3)
HCT VFR BLD CALC: 20 % (ref 36.5–49.3)
HGB BLD-MCNC: 7.6 G/DL (ref 12–17)
HGB BLD-MCNC: 8.1 G/DL (ref 12–17)
HGB BLD-MCNC: 8.9 G/DL (ref 12–17)
HGB BLD-MCNC: 9.8 G/DL (ref 12–17)
HGB BLDA-MCNC: 6.8 G/DL (ref 12–17)
INR PPP: 4.22 (ref 0.86–1.16)
LACTATE SERPL-SCNC: 1.9 MMOL/L (ref 0.5–2)
LACTATE SERPL-SCNC: 2.2 MMOL/L (ref 0.5–2)
LACTATE SERPL-SCNC: 4.8 MMOL/L (ref 0.5–2)
LYMPHOCYTES # BLD AUTO: 1.11 THOUSANDS/ΜL (ref 0.6–4.47)
LYMPHOCYTES NFR BLD AUTO: 7 % (ref 14–44)
MCH RBC QN AUTO: 29.8 PG (ref 26.8–34.3)
MCH RBC QN AUTO: 30.8 PG (ref 26.8–34.3)
MCHC RBC AUTO-ENTMCNC: 33.5 G/DL (ref 31.4–37.4)
MCHC RBC AUTO-ENTMCNC: 34 G/DL (ref 31.4–37.4)
MCV RBC AUTO: 88 FL (ref 82–98)
MCV RBC AUTO: 92 FL (ref 82–98)
MONOCYTES # BLD AUTO: 0.58 THOUSAND/ΜL (ref 0.17–1.22)
MONOCYTES NFR BLD AUTO: 4 % (ref 4–12)
NASAL CANNULA: 6
NEUTROPHILS # BLD AUTO: 15 THOUSANDS/ΜL (ref 1.85–7.62)
NEUTS SEG NFR BLD AUTO: 89 % (ref 43–75)
NRBC BLD AUTO-RTO: 0 /100 WBCS
O2 CT BLDA-SCNC: 11.4 ML/DL (ref 16–23)
OXYHGB MFR BLDA: 97.2 % (ref 94–97)
PCO2 BLD: 13 MMOL/L (ref 21–32)
PCO2 BLD: 21.8 MM HG (ref 36–44)
PCO2 BLDA: 32.2 MM HG (ref 36–44)
PH BLD: 7.38 [PH] (ref 7.35–7.45)
PH BLDA: 7.34 [PH] (ref 7.35–7.45)
PLATELET # BLD AUTO: 246 THOUSANDS/UL (ref 149–390)
PLATELET # BLD AUTO: 268 THOUSANDS/UL (ref 149–390)
PMV BLD AUTO: 10.5 FL (ref 8.9–12.7)
PMV BLD AUTO: 10.9 FL (ref 8.9–12.7)
PO2 BLD: 81 MM HG (ref 75–129)
PO2 BLDA: 130.3 MM HG (ref 75–129)
POTASSIUM BLD-SCNC: 3.7 MMOL/L (ref 3.5–5.3)
POTASSIUM SERPL-SCNC: 3.7 MMOL/L (ref 3.5–5.3)
POTASSIUM SERPL-SCNC: 3.7 MMOL/L (ref 3.5–5.3)
PROT SERPL-MCNC: 6 G/DL (ref 6.4–8.2)
PROTHROMBIN TIME: 41.4 SECONDS (ref 12.1–14.4)
RBC # BLD AUTO: 2.72 MILLION/UL (ref 3.88–5.62)
RBC # BLD AUTO: 2.89 MILLION/UL (ref 3.88–5.62)
RH BLD: POSITIVE
SAMPLE SITE: ABNORMAL
SAO2 % BLD FROM PO2: 96 % (ref 95–98)
SODIUM BLD-SCNC: 138 MMOL/L (ref 136–145)
SODIUM SERPL-SCNC: 140 MMOL/L (ref 136–145)
SODIUM SERPL-SCNC: 141 MMOL/L (ref 136–145)
SPECIMEN EXPIRATION DATE: NORMAL
SPECIMEN SOURCE: ABNORMAL
SPECIMEN SOURCE: ABNORMAL
WBC # BLD AUTO: 16.79 THOUSAND/UL (ref 4.31–10.16)
WBC # BLD AUTO: 7.51 THOUSAND/UL (ref 4.31–10.16)

## 2018-02-11 PROCEDURE — 86920 COMPATIBILITY TEST SPIN: CPT

## 2018-02-11 PROCEDURE — 86927 PLASMA FRESH FROZEN: CPT

## 2018-02-11 PROCEDURE — 36600 WITHDRAWAL OF ARTERIAL BLOOD: CPT

## 2018-02-11 PROCEDURE — 82330 ASSAY OF CALCIUM: CPT

## 2018-02-11 PROCEDURE — 85027 COMPLETE CBC AUTOMATED: CPT | Performed by: PHYSICIAN ASSISTANT

## 2018-02-11 PROCEDURE — 82948 REAGENT STRIP/BLOOD GLUCOSE: CPT

## 2018-02-11 PROCEDURE — 80048 BASIC METABOLIC PNL TOTAL CA: CPT | Performed by: SURGERY

## 2018-02-11 PROCEDURE — 82803 BLOOD GASES ANY COMBINATION: CPT

## 2018-02-11 PROCEDURE — 84132 ASSAY OF SERUM POTASSIUM: CPT

## 2018-02-11 PROCEDURE — P9017 PLASMA 1 DONOR FRZ W/IN 8 HR: HCPCS

## 2018-02-11 PROCEDURE — 83605 ASSAY OF LACTIC ACID: CPT | Performed by: PHYSICIAN ASSISTANT

## 2018-02-11 PROCEDURE — P9021 RED BLOOD CELLS UNIT: HCPCS

## 2018-02-11 PROCEDURE — 80053 COMPREHEN METABOLIC PANEL: CPT | Performed by: PHYSICIAN ASSISTANT

## 2018-02-11 PROCEDURE — 83605 ASSAY OF LACTIC ACID: CPT | Performed by: INTERNAL MEDICINE

## 2018-02-11 PROCEDURE — 99222 1ST HOSP IP/OBS MODERATE 55: CPT | Performed by: SURGERY

## 2018-02-11 PROCEDURE — 86901 BLOOD TYPING SEROLOGIC RH(D): CPT | Performed by: STUDENT IN AN ORGANIZED HEALTH CARE EDUCATION/TRAINING PROGRAM

## 2018-02-11 PROCEDURE — 99221 1ST HOSP IP/OBS SF/LOW 40: CPT | Performed by: INTERNAL MEDICINE

## 2018-02-11 PROCEDURE — 85014 HEMATOCRIT: CPT

## 2018-02-11 PROCEDURE — 82805 BLOOD GASES W/O2 SATURATION: CPT | Performed by: SURGERY

## 2018-02-11 PROCEDURE — 99232 SBSQ HOSP IP/OBS MODERATE 35: CPT | Performed by: PHYSICIAN ASSISTANT

## 2018-02-11 PROCEDURE — 85025 COMPLETE CBC W/AUTO DIFF WBC: CPT | Performed by: PHYSICIAN ASSISTANT

## 2018-02-11 PROCEDURE — 82947 ASSAY GLUCOSE BLOOD QUANT: CPT

## 2018-02-11 PROCEDURE — 85018 HEMOGLOBIN: CPT | Performed by: INTERNAL MEDICINE

## 2018-02-11 PROCEDURE — 85610 PROTHROMBIN TIME: CPT | Performed by: PHYSICIAN ASSISTANT

## 2018-02-11 PROCEDURE — 74174 CTA ABD&PLVS W/CONTRAST: CPT

## 2018-02-11 PROCEDURE — 84295 ASSAY OF SERUM SODIUM: CPT

## 2018-02-11 PROCEDURE — 0HQ7XZZ REPAIR ABDOMEN SKIN, EXTERNAL APPROACH: ICD-10-PCS | Performed by: SURGERY

## 2018-02-11 PROCEDURE — 86900 BLOOD TYPING SEROLOGIC ABO: CPT | Performed by: STUDENT IN AN ORGANIZED HEALTH CARE EDUCATION/TRAINING PROGRAM

## 2018-02-11 PROCEDURE — 85014 HEMATOCRIT: CPT | Performed by: STUDENT IN AN ORGANIZED HEALTH CARE EDUCATION/TRAINING PROGRAM

## 2018-02-11 PROCEDURE — 85018 HEMOGLOBIN: CPT | Performed by: STUDENT IN AN ORGANIZED HEALTH CARE EDUCATION/TRAINING PROGRAM

## 2018-02-11 PROCEDURE — 86850 RBC ANTIBODY SCREEN: CPT | Performed by: STUDENT IN AN ORGANIZED HEALTH CARE EDUCATION/TRAINING PROGRAM

## 2018-02-11 PROCEDURE — 85730 THROMBOPLASTIN TIME PARTIAL: CPT | Performed by: PHYSICIAN ASSISTANT

## 2018-02-11 PROCEDURE — 83605 ASSAY OF LACTIC ACID: CPT | Performed by: STUDENT IN AN ORGANIZED HEALTH CARE EDUCATION/TRAINING PROGRAM

## 2018-02-11 PROCEDURE — 85014 HEMATOCRIT: CPT | Performed by: INTERNAL MEDICINE

## 2018-02-11 RX ORDER — OXYCODONE HYDROCHLORIDE 5 MG/1
7.5 TABLET ORAL EVERY 6 HOURS
Status: DISCONTINUED | OUTPATIENT
Start: 2018-02-11 | End: 2018-02-12

## 2018-02-11 RX ORDER — FUROSEMIDE 10 MG/ML
20 INJECTION INTRAMUSCULAR; INTRAVENOUS ONCE
Status: DISCONTINUED | OUTPATIENT
Start: 2018-02-11 | End: 2018-02-11

## 2018-02-11 RX ORDER — ACETAMINOPHEN 325 MG/1
650 TABLET ORAL EVERY 8 HOURS SCHEDULED
Status: DISCONTINUED | OUTPATIENT
Start: 2018-02-11 | End: 2018-02-12

## 2018-02-11 RX ORDER — SODIUM CHLORIDE 9 MG/ML
75 INJECTION, SOLUTION INTRAVENOUS CONTINUOUS
Status: DISCONTINUED | OUTPATIENT
Start: 2018-02-11 | End: 2018-02-12

## 2018-02-11 RX ORDER — LIDOCAINE HYDROCHLORIDE 10 MG/ML
1 INJECTION, SOLUTION EPIDURAL; INFILTRATION; INTRACAUDAL; PERINEURAL ONCE
Status: DISCONTINUED | OUTPATIENT
Start: 2018-02-11 | End: 2018-02-11

## 2018-02-11 RX ORDER — LORAZEPAM 2 MG/ML
0.5 INJECTION INTRAMUSCULAR ONCE
Status: COMPLETED | OUTPATIENT
Start: 2018-02-11 | End: 2018-02-11

## 2018-02-11 RX ADMIN — OXYCODONE HYDROCHLORIDE 5 MG: 5 SOLUTION ORAL at 08:43

## 2018-02-11 RX ADMIN — OXYCODONE HYDROCHLORIDE 7.5 MG: 5 TABLET ORAL at 11:55

## 2018-02-11 RX ADMIN — IODIXANOL 80 ML: 320 INJECTION, SOLUTION INTRAVASCULAR at 16:40

## 2018-02-11 RX ADMIN — HYDROMORPHONE HYDROCHLORIDE 0.5 MG: 1 INJECTION, SOLUTION INTRAMUSCULAR; INTRAVENOUS; SUBCUTANEOUS at 15:45

## 2018-02-11 RX ADMIN — ACETAMINOPHEN 650 MG: 325 TABLET, FILM COATED ORAL at 01:31

## 2018-02-11 RX ADMIN — LIDOCAINE HYDROCHLORIDE 1 ML: 10 INJECTION, SOLUTION EPIDURAL; INFILTRATION; INTRACAUDAL; PERINEURAL at 17:00

## 2018-02-11 RX ADMIN — SODIUM CHLORIDE 75 ML/HR: 0.9 INJECTION, SOLUTION INTRAVENOUS at 13:40

## 2018-02-11 RX ADMIN — FAMOTIDINE 20 MG: 20 TABLET, FILM COATED ORAL at 08:43

## 2018-02-11 RX ADMIN — ENOXAPARIN SODIUM 40 MG: 40 INJECTION SUBCUTANEOUS at 08:43

## 2018-02-11 RX ADMIN — LORAZEPAM 0.5 MG: 2 INJECTION INTRAMUSCULAR; INTRAVENOUS at 15:40

## 2018-02-11 RX ADMIN — SODIUM CHLORIDE 1000 ML: 0.9 INJECTION, SOLUTION INTRAVENOUS at 16:07

## 2018-02-11 RX ADMIN — POLYETHYLENE GLYCOL 3350 17 G: 17 POWDER, FOR SOLUTION ORAL at 08:43

## 2018-02-11 RX ADMIN — SENNOSIDES 8.6 MG: 8.6 TABLET, FILM COATED ORAL at 08:43

## 2018-02-11 RX ADMIN — SODIUM CHLORIDE 1000 ML: 0.9 INJECTION, SOLUTION INTRAVENOUS at 18:24

## 2018-02-11 NOTE — PROGRESS NOTES
Re-evaluated patient is evening, still with significant abdominal pain  Discussed with attending  Will order ammonia level and lactic acid, vital signs are stable  Repeat CT scan of abdomen/pelvis with IV contrast now  D/w wife, agree with palliative med consult for pain mgnt  Patient recieved more pain medications today due to inc pain, suspect his confusion is medication induced  Will adjust oxy doses  Consider CT head if persists  Will continue to monitor closely

## 2018-02-11 NOTE — ASSESSMENT & PLAN NOTE
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

## 2018-02-11 NOTE — ASSESSMENT & PLAN NOTE
· Will continue to trend   · Likely secondary to stress, inflammation   · Afebrile - abx not currently indicated   · Monitor output from perc biliary drains - non purulent

## 2018-02-11 NOTE — PROGRESS NOTES
Abdominal biliary tube had some bleeding around insertion that increased during afternoon, saturating 3 dressing changes thru out the day  Tube is patent with flush  After holding mild pressure for about 12 min  It appears to have halted some  Slim P A  Came to see patient and called Surgery who stated they will come and check on him today

## 2018-02-11 NOTE — RAPID RESPONSE
Progress Note - Rapid Response   Elisa Perkins 71 y o  male MRN: [id number]    Time Called ( Time): 4512  Room#: 743  [id number] Time ( Time): 8928  Event End Time ( Time): 36  Primary reason for call: Acute change in mental status and Other bleed  Interventions:  Airway/Breathing:  O2 Mask/Nasal  Circulation: IV Fluid Bolus and Blood Products  Other Treatments: N/A       Assessment:   1  Acute blood loss  2  AMS  3  Tachycardia  4  Anemia   5  Metabolic acidosis with respiratory compensation    Plan:   · 1 L IV NSS bolus  · NC O2 at 5 L  · CT Abd Pelvis with contrast  · Transfer pt to ICU  · Family notified  · Blood transfusion consent signed  · Will transfuse 2U pRBC uncrossed  · Primary team at bedside  · Labs drawn  · ISTAT       HPI/Chief Complaint (Background/Situation):   Elisa Perkins is a 71y o  year old male who presents with 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  Rapid called due to AMS and bleeding that became uncontrollable  Discussed DNR/DNI status with family but still want to keep pt at Level 1  Pressure placed at site of tube entrance  ISTAT showed Hgb 6 8 Hct 20  PH 7 378, pCO2 21 8, pO2 81, HCO3 12 8  Pt satting at 96% RA  SBP ranged from 100-120's  HR was mildly tachy around 100-110  This improved somewhat to the high 90's with administration of 1 L bolus NSS  Bleeding was not controlled by end of rapid  Surgery at bedside and will follow pt to CT  Pt will be transferred to ICU  Pt will receive 2U pRBC and lasix after transfusion  Historical Information   Past Medical History:   Diagnosis Date    COPD [facility]chronic obstructive pulmonary disease) ([facility] [address] )     DJD (degenerative joint disease)      History reviewed  No pertinent surgical history    Social History   History   Alcohol Use No     History   Drug Use No     History   Smoking Status    Former Smoker   Smokeless Tobacco    Never Used     Family History: non-contributory    Meds/Allergies Current Facility-Administered Medications:  acetaminophen 650 mg Oral Q8H [facility] Teresita Dejesus MD    albuterol 2 puff Inhalation Q4H PRN Milly Hutton MD    docusate sodium 100 mg Oral BID Milly Hutton MD    famotidine 20 mg Oral Daily Radha Lerma MD    furosemide 20 mg Intravenous Once Wyatt Moreno MD    hydrALAZINE 5 mg Intravenous Q6H PRN Mitali Hdez PA-C    HYDROmorphone 0 2 mg Intravenous Q4H PRN Teresita Dejesus MD    HYDROmorphone 0 5 mg Intravenous Once Jonny Hdez PA-C    lidocaine (PF) 1 mL Infiltration Once Sima Franz MD    LORazepam 0 5 mg Intravenous Once Jonny Hdez PA-C    morphine injection 4 mg Intravenous Once Creig EdgarDO    ondansetron 4 mg Intravenous Q6H PRN Milly Hutton MD    oxyCODONE 7 5 mg Oral Q6H Teresita Dejesus MD    oxyCODONE 2 5 mg Oral Q4H PRN Mitali Hdez PA-C    oxyCODONE 5 mg Oral Q6H PRN Jonny Hdez PA-C    polyethylene glycol 17 g Oral Daily Milly Hutton MD    senna 1 tablet Oral Daily Milly Hutton MD    simethicone 80 mg Oral Q6H PRN Jonny Hdez PA-C    sodium chloride 1,000 mL Intravenous Once Mitali Hdez PA-C Last Rate: 1,000 mL (02/11/18 1607)   sodium chloride 75 mL/hr Intravenous Continuous Jonny Hdez PA-C Last Rate: 75 mL/hr (02/11/18 1340)         sodium chloride 75 mL/hr Last Rate: 75 mL/hr (02/11/18 1340)       Allergies   Allergen Reactions    Sulfa Antibiotics Rash       ROS: not obtainable 2/2 AMS    Physical Exam:  Gen: jaundiced, confused  Chest: CTA x 2  Cor: Tachy  Abd: Biliary tube placed with bleeding around opening  Neuro:  AMS  Skin: yellow; bloody on abd      Intake/Output Summary (Last 24 hours) at 02/11/18 1634  Last data filed at 02/11/18 1300   Gross per 24 hour   Intake           1147 5 ml   Output              650 ml   Net            497 5 ml       Respiratory    Lab Data (Last 4 hours)    None         O2/Vent Data (Last 4 hours)    None              Invasive Devices Peripheral Intravenous Line            Peripheral IV 02/10/18 Right Wrist 1 day    Peripheral IV 02/10/18 Left Antecubital less than 1 day    Peripheral IV 02/11/18 Right Antecubital less than 1 day          Drain            Closed/Suction Drain Left Abdomen Bulb 10 2 Fr  5 days    Closed/Suction Drain Right Abdomen Bulb 10 2 Fr  5 days    Nephrostomy Left 1 8 5 Fr  5 days                DIAGNOSTIC DATA:    Lab: I have personally reviewed pertinent lab results     CBC:     Results from last 7 days  Lab Units 02/11/18  1629   WBC Thousand/uL 7 51   HEMOGLOBIN g/dL 8 9*   HEMATOCRIT % 26 6*   PLATELETS Thousands/uL 268     CMP:     Results from last 7 days  Lab Units 02/11/18  1605 02/11/18  0524 02/10/18  0553 02/09/18  1034   SODIUM mmol/L  --  140 140 140   POTASSIUM mmol/L  --  3 7 3 7 3 4*   CHLORIDE mmol/L  --  109* 109* 108   CO2 mmol/L  --  20* 23 23   BUN mg/dL  --  34* 22 22   CREATININE mg/dL  --  1 91* 1 05 1 09   CALCIUM mg/dL  --  8 0* 8 4 8 3   TOTAL PROTEIN g/dL  --  6 0* 6 5 6 6   BILIRUBIN TOTAL mg/dL  --  15 01* 14 64* 15 32*   ALK PHOS U/L  --  255* 309* 329*   ALT U/L  --  74 62 65   AST U/L  --  118* 74* 80*   GLUCOSE RANDOM mg/dL  --  117 102 112   GLUCOSE, ISTAT mg/dl 134  --   --   --      PT/INR:   No results found for: PT, INR,   Magnesium: No results found for: MAG,   Phosphorous: No results found for: PHOS    Microbiology:  No results found for: DONALD Byrne      OUTCOME:   Transferred to Critical Care Unit  Family notified of transfer: yes  Code Status: Level 1 - Full Code

## 2018-02-11 NOTE — ASSESSMENT & PLAN NOTE
· Lt sided tortuous ureter but no clear obstruction  · Suspected 2/2 infiltrative tumour causing extrinsic compression  · S/p perc neprhostomy on left  · Urology rec f/u in 1 month to discuss possible conversion to ureteral stent  · Status post left-sided percutaneous nephrostomy placed on 02/06/2018  · Patient will have visiting nurses

## 2018-02-11 NOTE — CONSULTS
Consultation - General Surgery   Keyana العلي 71 y o  male MRN: [id number]  Unit/Bed#: ICU 10 Encounter: [id number]    Assessment/Plan     Assessment:  71 y o  M w/ bleeding from PTC drain site; concern for poss intra-abdominal bleeding from PTC insertion site at liver vs bleeding from tumor deposits    Plan:  ICU level of care  Trend Hgb, resuscitative endpoints  Serial exams  F/U CTA abdomen and pelvis     History of Present Illness     HPI:  Keyana العلي is a 71 y o  male who presents with bleeding from his IR biliary drainage catheter site  Patient has been admitted for the past 11 days for obstructive jaundice 2/2 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 malignancy w/ intra-abdominal metastasis  Patient had 2 PTC catheters placed on 2/7/18; last night he started to have oozing from the midline insertion site  Today, he started having more profuse bleeding from the site  Today, he has DANGELO and is acidotic  He is mildy coagulopathic  We have been consulted for concern for bleeding  He is obtunded  Inpatient consult to Acute Care Surgery     Performed by  Reddy Ty by Bob Thurman              Review of Systems  Unable to be obtained 2/2 [facility]     Historical Information   Past Medical History:   Diagnosis Date    COPD [facility]chronic obstructive pulmonary disease) ([facility] [address] )     DJD (degenerative joint disease)      History reviewed  No pertinent surgical history    Social History   History   Alcohol Use No     History   Drug Use No     History   Smoking Status    Former Smoker   Smokeless Tobacco    Never Used     Family History: non-contributory    Meds/Allergies   all current active meds have been reviewed  Allergies   Allergen Reactions    Sulfa Antibiotics Rash       Objective   First Vitals:   Blood Pressure: 137/83 (01/31/18 1827)  Pulse: 84 (01/31/18 1827)  Temperature: 97 9 °F (36 6 °C) (01/31/18 1827)  Temp Source: Tympanic (01/31/18 1827)  Respirations: 18 (01/31/18 1827)  Height: 5' 8" (172 7 cm) (01/31/18 2234)  Weight - Scale: 59 kg (130 lb) (01/31/18 1827)  SpO2: 99 % (01/31/18 1827)    Current Vitals:   Blood Pressure: 99/56 (02/11/18 1623)  Pulse: 96 (02/11/18 1622)  Temperature: 97 6 °F (36 4 °C) (02/11/18 0809)  Temp Source: Oral (02/11/18 0809)  Respirations: 20 (02/11/18 0809)  Height: 5' 8" (172 7 cm) (01/31/18 2234)  Weight - Scale: 56 8 kg (125 lb 3 5 oz) (02/01/18 0530)  SpO2: 98 % (02/11/18 1605)      Intake/Output Summary (Last 24 hours) at 02/11/18 1707  Last data filed at 02/11/18 1300   Gross per 24 hour   Intake           1147 5 ml   Output              650 ml   Net            497 5 ml       Invasive Devices     Peripheral Intravenous Line            Peripheral IV 02/10/18 Right Wrist 1 day    Peripheral IV 02/10/18 Left Antecubital less than 1 day    Peripheral IV 02/11/18 Right Antecubital less than 1 day          Drain            Closed/Suction Drain Left Abdomen Bulb 10 2 Fr  5 days    Closed/Suction Drain Right Abdomen Bulb 10 2 Fr  5 days    Nephrostomy Left 1 8 5 Fr  5 days                Physical Exam   HENT:   Scleral icterus   Eyes: Scleral icterus is present  Neck: Normal range of motion  Cardiovascular:   Tachycardia   Pulmonary/Chest: Effort normal    Tachypnea   Abdominal: Soft  He exhibits distension  There is tenderness  PTC at mid abdomen w/ profuse bleeding from around drain, drainage bag w/ bilious drainage   Neurological: He is alert  Skin: He is diaphoretic         Lab Results:   CBC:   Lab Results   Component Value Date    WBC 7 51 02/11/2018    HGB 7 6 (L) 02/11/2018    HCT 22 0 (L) 02/11/2018    MCV 92 02/11/2018     02/11/2018    MCH 30 8 02/11/2018    MCHC 33 5 02/11/2018    RDW 23 5 (H) 02/11/2018    MPV 10 5 02/11/2018    NRBC 0 02/11/2018   , CMP:   Lab Results   Component Value Date     02/11/2018    K 3 7 02/11/2018     (H) 02/11/2018    CO2 19 (L) 02/11/2018    ANIONGAP 11 02/11/2018    BUN 39 (H) 02/11/2018    CREATININE 2 06 (H) 02/11/2018    GLUCOSE 119 02/11/2018    GLUCOSE 134 02/11/2018    CALCIUM 7 2 (L) 02/11/2018     (H) 02/11/2018    ALT 74 02/11/2018    ALKPHOS 255 (H) 02/11/2018    PROT 6 0 (L) 02/11/2018    BILITOT 15 01 (H) 02/11/2018    EGFR 31 02/11/2018   , Coagulation:   Lab Results   Component Value Date    INR 4 22 (H) 02/11/2018     Imaging: I have personally reviewed pertinent reports  and I have personally reviewed pertinent films in PACS  EKG, Pathology, and Other Studies: I have personally reviewed pertinent reports

## 2018-02-11 NOTE — ASSESSMENT & PLAN NOTE
· Patient had plan to follow up with Dr Madrid tomorrow to address his options in regards to chemo  · Discussed in detail with family today, patient and SO interested in chemo but would like to d/w onc - will ask onc to evaluate patient tomorrow   · Given that his status has declined significantly over the weekend, patient may now not be candidate for treatment   · Palliative care input appreciated for pain management, will readdress goals w patient and SO, may be candidate for hospice if no improvement in clinical status ?   · Plan for paracentesis tomorrow of abdominal ascites   · May benefit from family meeting in near future

## 2018-02-11 NOTE — PROGRESS NOTES
Notified by RN that bleeding around midline perc jonny drain persists  Drainage from bag does appear somewhat blood tinged now with debris  Discussed with Dr Gavin Freeman, IR on call, who recommends inj lido w epi, as bleed likely superficial if no improvement with compression  Consider this is secondary to or worsened by abdominal ascites  Tubes were checked in IR on Friday and patent  also d/w gen surgery resident who will eval patient today, recommends holding pressure for now  Discussed with RN to hold pressure for 10 minutes as tolerated given abdominal pain  Vitals and mental status are stable  Recheck hgb and coags  Hold lovenox  IR consult placed for tomorrow

## 2018-02-11 NOTE — ASSESSMENT & PLAN NOTE
· Not POA   · Has R PCN in place functioning well, no hydro on CT scan   · Suspect prerenal as patient has had poor appetite over last few days, dye load yesterday  · Gentle IVF hydration   · Monitor UO

## 2018-02-11 NOTE — ASSESSMENT & PLAN NOTE
· Painless jaundice, mainly obstructive + some infiltrative component 2/2 extrinsic tumor/masses  · Bilirubin stable around 13-15  · Obstruction secondary to mass s/p biopsy 2/2/18  · S/p 2 percutaneous biliary drains placed on 2/6/18   · D/w GI in detail 2/9, unfortunately not much else they can do from GI standpoint, will need OP onc follow up  · S/p IR tube check 2/9/18 - patent  · CT abdomen/pelvis - tubes patent 2/10  · Having bleeding and yellow drainage at site of perc biliary drain - will ask GI to evaluate today if able, otherwise will need IR re-eval tomorrow, possible that suture has been removed or drain migrated while changing in position in bed  · Pain control with oxycodone, dose was decreased due to confusion, appreciate palliative med consult   · Plan was to DC home with [facility]NA but may be candidate for hospice if he continues to decline ?

## 2018-02-11 NOTE — PROGRESS NOTES
Patient resting comfortably in bed, vital signs are stable Blood work ordered and to be drawn  Will continue to monitor    Jerry Rivers RN

## 2018-02-11 NOTE — ASSESSMENT & PLAN NOTE
· CT abdomen and pelvis with increasing ascites - will place IR c/s for paracentesis   · Continue dec dose of oxy and dilaudid prn - will appreciate pall med c/s for pain mgnt   · Continue bowel regimen for constipation while on opioids   · Continue pepcid   · LFT monitoring

## 2018-02-11 NOTE — CONSULTS
Consultation - Palliative and Supportive Care   Keyana العلي 71 y o  male [id number]    Assessment:     Patient Active Problem List   Diagnosis    Chronic low back pain    Chronic obstructive pulmonary disease (HCC)    Degenerative joint disease (DJD) of hip    Fatigue    Generalized abdominal pain    GERD (gastroesophageal reflux disease)    Jaundice    Left foot pain    Plantar fasciitis, left    Poor dentition    Right hip pain    Abnormal CT of the abdomen    Abnormal LFTs    Weight loss, non-intentional    UTI (urinary tract infection)    Liver mass    Other hydronephrosis    Metastatic adenocarcinoma (HCC)    Goals of care, counseling/discussion         Plan:  1  Symptom management -   Abdominal Pain:  Likely secondary to worsening ascites, as well as pain related to his extensive metastatic disease  Ultimately I think a paracentesis would be the most beneficial method of pain control at this time, however in the meantime I will place the patient on oxycodone 7 5 mg q 6 hours with multiple different levels of p r n  dosing  I have lowered level of his IV Dilaudid p r n  as well  Ultimately pain control is going to be difficult until we have clarified goals of care  Given the initial conversation I had with patient and his significant other, I will err on the side of less pain control in favor of keeping patient as alert as possible  I have also schedule patient Tylenol at a reduced dose given his worsening liver function  2  Goals - evolving  Patient and significant other are very interested in hearing a plan from Oncology  I would like to set up a meeting tomorrow with Oncology to discuss treatment options, and if the patient would even tolerate them  Will need to clarify medical decision maker as well as patient's significant other seems to be leading decision making apparatus, however I have heard a report that he has an adult son as well       Code Status:  Level one   Decisional apparatus:  Patient is competent on my exam today  If competence is lost, patient's substitute decision maker would default to adult son by [place]  Advance Directive / Living Will / POLST:  n/a         We appreciate the invitation to be involved in this patient's care  We will continue to follow  Please do not hesitate to reach our on call provider through our clinic answering service at  should you have acute symptom control concerns  IDENTIFICATION:       Inpatient consult to Palliative Care     Performed by  Corina Velez     Authorized by Cindy Dean            Physician Requesting Consult: Royal Ze MD  Reason for Consult / Principal Problem:  Pain control, goals of care  Hx and PE limited by:  Patient currently in pain, and needing to be taken to the restroom  HISTORY OF PRESENT ILLNESS:       Nahun Zavaleta is a 71 y o  male with baseline COPD as well as history of painful arthritis  He presented with painless jaundice  Was found to be hyperbilirubinemia  Abdominal imaging revealed significant metastatic tumor burden of the abdomen  Pathology revealed a tumor that is consistent with adenocarcinoma of either pancreatic or biliary origin  He is now status post nephrostomy tube placement as well as multiple biliary drains  He continues to have issues with hyperbilirubinemia  He is profoundly protein calorie malnourished with an albumin of 1 2 and significant weight loss  He had planned to leave the hospital and follow up with Dr Kiesha Castro in [facility], unfortunately his pain has dramatically escalated over the past few days  Per report his abdominal girth has also increased rapidly, and CT abdomen pelvis shows evolving malignant ascites  Yesterday he received 25 mg of oxycodone approximately in 24 hours, as well as two doses of 0 5 mg of hydromorphone    He did have some pain relief with this, however he was felt to be too sleepy by his significant other and therefore they are requesting his pain control be decreased  Today he is in exquisite pain, and history taking as well as physical exam limited by his uncontrolled pain  To assess the length to which she would like pain control, we did have a brief initial goals of care discussion, and at this point mental clarity is more important to him then pain management  Review of Systems   Unable to perform ROS: severity of pain       Past Medical History:   Diagnosis Date    COPD (chronic obstructive pulmonary disease) ([address] )     DJD (degenerative joint disease)      History reviewed  No pertinent surgical history  Social History     Social History    Marital status: Single     Spouse name: N/A    Number of children: N/A    Years of education: N/A     Occupational History    Not on file  Social History Main Topics    Smoking status: Former Smoker    Smokeless tobacco: Never Used    Alcohol use No    Drug use: No    Sexual activity: Not on file     Other Topics Concern    Not on file     Social History Narrative    No narrative on file     History reviewed  No pertinent family history      MEDICATIONS / ALLERGIES:    all current active meds have been reviewed and current meds:   Current Facility-Administered Medications   Medication Dose Route Frequency    acetaminophen (TYLENOL) tablet 650 mg  650 mg Oral Q8H [address]    albuterol (PROVENTIL HFA,VENTOLIN HFA) inhaler 2 puff  2 puff Inhalation Q4H PRN    docusate sodium (COLACE) capsule 100 mg  100 mg Oral BID    enoxaparin (LOVENOX) subcutaneous injection 40 mg  40 mg Subcutaneous Daily    famotidine (PEPCID) tablet 20 mg  20 mg Oral Daily    hydrALAZINE (APRESOLINE) injection 5 mg  5 mg Intravenous Q6H PRN    HYDROmorphone (DILAUDID) injection 0 2 mg  0 2 mg Intravenous Q4H PRN    morphine (PF) 4 mg/mL injection 4 mg  4 mg Intravenous Once    ondansetron (ZOFRAN) injection 4 mg  4 mg Intravenous Q6H PRN    oxyCODONE (ROXICODONE) IR tablet 7 5 mg  7 5 mg Oral Q6H    oxyCODONE (ROXICODONE) oral solution 2 5 mg  2 5 mg Oral Q4H PRN    oxyCODONE (ROXICODONE) oral solution 5 mg  5 mg Oral Q6H PRN    polyethylene glycol (MIRALAX) packet 17 g  17 g Oral Daily    senna (SENOKOT) tablet 8 6 mg  1 tablet Oral Daily    simethicone (MYLICON) chewable tablet 80 mg  80 mg Oral Q6H PRN    sodium chloride 0 9 % infusion  75 mL/hr Intravenous Continuous       Allergies   Allergen Reactions    Sulfa Antibiotics Rash       OBJECTIVE:    Physical Exam  Physical Exam   Constitutional: He appears cachectic  He is cooperative  He has a sickly appearance  No distress  HENT:   Right Ear: External ear normal    Left Ear: External ear normal    Nose: Nose normal    Temporal wasting   Eyes: Lids are normal  Right eye exhibits no discharge  Left eye exhibits no discharge  Scleral icterus is present  Neck: No JVD present  Cardiovascular: Normal rate, regular rhythm and normal heart sounds  Pulmonary/Chest: Effort normal  No respiratory distress  Abdominal: Soft  He exhibits distension  There is tenderness  Biliary drains   Musculoskeletal:   No edema   Neurological: He is alert  Skin: Skin is warm and dry  He is not diaphoretic  No pallor  L neph tube, jaundice   Psychiatric: He has a normal mood and affect  Nursing note and vitals reviewed  Lab Results:   I have personally reviewed pertinent labs  , CBC:   Lab Results   Component Value Date    WBC 16 79 (H) 02/11/2018    HGB 8 1 (L) 02/11/2018    HCT 23 8 (L) 02/11/2018    MCV 88 02/11/2018     02/11/2018    MCH 29 8 02/11/2018    MCHC 34 0 02/11/2018    RDW 24 7 (H) 02/11/2018    MPV 10 9 02/11/2018    NRBC 0 02/11/2018   , CMP:   Lab Results   Component Value Date     02/11/2018    K 3 7 02/11/2018     (H) 02/11/2018    CO2 20 (L) 02/11/2018    ANIONGAP 11 02/11/2018    BUN 34 (H) 02/11/2018    CREATININE 1 91 (H) 02/11/2018    GLUCOSE 117 02/11/2018 CALCIUM 8 0 (L) 02/11/2018     (H) 02/11/2018    ALT 74 02/11/2018    ALKPHOS 255 (H) 02/11/2018    PROT 6 0 (L) 02/11/2018    BILITOT 15 01 (H) 02/11/2018    EGFR 34 02/11/2018     Imaging Studies: reviewed abdominal CT showing widely metastatic disease  EKG, Pathology, and Other Studies: pathology showing adenoCA    Counseling / Coordination of Care  Total floor / unit time spent today 25+ minutes  Greater than 50% of total time was spent with the patient and / or family counseling and / or coordination of care   A description of the counseling / coordination of care: symptom assessment, med changes, pain mgmt, initial GoC discussion

## 2018-02-11 NOTE — H&P
History and Physical - Critical Care  Mickey Burton 71 y o  male MRN: [id number]  Unit/Bed#: ICU 10 Encounter: [id number]     Reason for Admission / Chief Complaint: Hypotension, intraabdominal bleeding   History of Present Illness:  Mickey Burton is a 71 y o  male with past medical history significant adenocarcinoma of unknown origin with pancreatic Mets  Rapid was called due to altered mental status and bleeding that became uncontrollable  I-STAT showed hemoglobin 6 8 pH 7 38, CO2 21, P O2 81, H CO3 12 8 patient was initially admitted January 31st for evaluation abnormal CT scan finding  Patient with noted 30 lb unintentional weight loss over the last 2 months changes in bowel habits, fatigue, lower abdominal pain  Recently his son noted that he states started noticing the skin was yellow  Patient went to the his PCP approximately 2-3 weeks ago and had an outpatient CT scan which reported 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 origin involving upper abdominal organs, mentum, peritoneum, long segment of sigmoid colon  On admission T bili was noted to be 14 9 with a direct of 12 6  AST ALT and alk phos were also elevated  Patient with painless jaundice most likely secondary to obstruction in infiltration due to extrinsic tumor  Patient with a bilirubin trending upwards to approximately 15 has not improved since placement of drains  Patient status post 2 percutaneous biliary drains placed on February 6th  Biopsy for mass near liver showed adenocarcinoma suspicious of pancreatic or biliary origin  Stage IV biliary/pancreatic cancer  CA 19 9 was noted to be elevated at 7000  Oncology following  Patient was thought to possibly be discharged today with VNA but but subsequently was bleeding and had a hemoglobin of 7 6, BP most recently 99/56,     Patient status post 1 5 L normal saline bolus and 2 units of pack red blood cells  History obtained from chart review       Past Medical History:  Past Medical History:   Diagnosis Date    COPD (chronic obstructive pulmonary disease) ([address] )     DJD (degenerative joint disease)         Past Surgical History:  History reviewed  No pertinent surgical history  Past Family History:  History reviewed  No pertinent family history  Social History:  History   Smoking Status    Former Smoker   Smokeless Tobacco    Never Used     History   Alcohol Use No     History   Drug Use No     Marital Status: Single       Medications:  Current Facility-Administered Medications   Medication Dose Route Frequency    acetaminophen (TYLENOL) tablet 650 mg  650 mg Oral Q8H [facility]    albuterol (PROVENTIL HFA,VENTOLIN HFA) inhaler 2 puff  2 puff Inhalation Q4H PRN    docusate sodium (COLACE) capsule 100 mg  100 mg Oral BID    famotidine (PEPCID) tablet 20 mg  20 mg Oral Daily    furosemide (LASIX) injection 20 mg  20 mg Intravenous Once    hydrALAZINE (APRESOLINE) injection 5 mg  5 mg Intravenous Q6H PRN    HYDROmorphone (DILAUDID) injection 0 2 mg  0 2 mg Intravenous Q4H PRN    lidocaine (PF) (XYLOCAINE-MPF) 1 % injection 1 mL  1 mL Infiltration Once    morphine (PF) 4 mg/mL injection 4 mg  4 mg Intravenous Once    ondansetron (ZOFRAN) injection 4 mg  4 mg Intravenous Q6H PRN    oxyCODONE (ROXICODONE) IR tablet 7 5 mg  7 5 mg Oral Q6H    oxyCODONE (ROXICODONE) oral solution 2 5 mg  2 5 mg Oral Q4H PRN    oxyCODONE (ROXICODONE) oral solution 5 mg  5 mg Oral Q6H PRN    polyethylene glycol (MIRALAX) packet 17 g  17 g Oral Daily    senna (SENOKOT) tablet 8 6 mg  1 tablet Oral Daily    simethicone (MYLICON) chewable tablet 80 mg  80 mg Oral Q6H PRN    sodium chloride 0 9 % bolus 1,000 mL  1,000 mL Intravenous Once    sodium chloride 0 9 % infusion  75 mL/hr Intravenous Continuous     Home medications:  Prior to Admission medications    Medication Sig Start Date End Date Taking?  Authorizing Provider   albuterol (VENTOLIN HFA) 90 mcg/act inhaler Inhale 2 puffs every 4 (four) hours as needed for wheezing 18  Yes Bob Martin, DO   Multiple Vitamins-Minerals (MULTI COMPLETE PO) Take 1 tablet by mouth daily 10/6/17  Yes Historical Provider, MD   naproxen (NAPROSYN) 500 mg tablet Take 1 tablet by mouth 2 (two) times a day as needed 10/6/17  Yes Historical Provider, MD     Allergies: Allergies   Allergen Reactions    Sulfa Antibiotics Rash        ROS:   Review of Systems   Reason unable to perform ROS: altered mental status  Constitutional: Negative for appetite change, chills, diaphoresis, fatigue, fever and unexpected weight change  HENT: Negative for congestion, rhinorrhea and sore throat  Eyes: Negative for photophobia and visual disturbance  Respiratory: Negative for cough, shortness of breath and wheezing  Cardiovascular: Negative for chest pain, palpitations and leg swelling  Gastrointestinal: Negative for anal bleeding, blood in stool, constipation, diarrhea, nausea and vomiting  Genitourinary: Negative for decreased urine volume, difficulty urinating, dysuria, flank pain, frequency, hematuria and urgency  Musculoskeletal: Negative for arthralgias, back pain, joint swelling and myalgias  Skin: Negative for color change and rash  Neurological: Negative for dizziness, seizures, facial asymmetry, speech difficulty, numbness and headaches  Psychiatric/Behavioral: Negative for agitation, confusion and decreased concentration  The patient is not nervous/anxious  Vitals:  Vitals:    18 1605 18 1605 18 1622 18 1623   BP:  130/66 109/58 99/56   BP Location:       Pulse: (!) 112 (!) 11 96    Resp:       Temp:       TempSrc:       SpO2: 99% 98%     Weight:       Height:         Temperature:   Temp (24hrs), Av 2 °F (36 8 °C), Min:97 6 °F (36 4 °C), Max:98 7 °F (37 1 °C)    Current: Temperature: 97 6 °F (36 4 °C)     Weights:   IBW: 68 4 kg  Body mass index is 19 04 kg/m²       Hemodynamic Monitoring:  ICP Mean: , CPP:       Non-Invasive/Invasive Ventilation Settings:  Respiratory    Lab Data (Last 4 hours)    None         O2/Vent Data (Last 4 hours)    None              No results found for: PHART, YFI2TXQ, PO2ART, BSF4LWB, P5DTDIWG, BEART, SOURCE  SpO2: SpO2: 100 %     Physical Exam:  Physical Exam   Constitutional: He appears well-developed and well-nourished  No distress  HENT:   Head: Normocephalic and atraumatic  Nose: Nose normal    Mouth/Throat: Oropharynx is clear and moist  No oropharyngeal exudate  Eyes: EOM are normal  Pupils are equal, round, and reactive to light  Right eye exhibits no discharge  Left eye exhibits no discharge  No scleral icterus  Neck: Normal range of motion  Neck supple  No JVD present  Cardiovascular: Normal rate, regular rhythm, normal heart sounds and intact distal pulses  Exam reveals no gallop and no friction rub  No murmur heard  Pulmonary/Chest: Effort normal and breath sounds normal  No respiratory distress  He has no wheezes  He has no rales  He exhibits no tenderness  Abdominal: Soft  Bowel sounds are normal  He exhibits no distension and no mass  There is no tenderness  There is no rebound and no guarding  Musculoskeletal: Normal range of motion  He exhibits no edema, tenderness or deformity  Lymphadenopathy:     He has no cervical adenopathy  Neurological: He has normal reflexes  AxO x 0   Skin: Skin is warm and dry  No rash noted  He is not diaphoretic  No erythema  No pallor          Labs:    Results from last 7 days  Lab Units 02/11/18  1658 02/11/18  1629 02/11/18  1605 02/11/18  0524 02/10/18  1114 02/07/18  0512 02/05/18  0520   WBC Thousand/uL  --  7 51  --  16 79* 13 80* 16 22* 14 02*   HEMOGLOBIN g/dL 7 6* 8 9*  --  8 1* 9 3* 8 9* 9 1*   I STAT HEMOGLOBIN g/dl  --   --  6 8*  --   --   --   --    HEMATOCRIT % 22 0* 26 6*  --  23 8* 27 4* 26 0* 26 9*   PLATELETS Thousands/uL  --  268  --  246 307 260 259   NEUTROS PCT %  --   --   --  89*  --  82* 77*   MONOS PCT %  --   --   --  4  --  9 9   MONO PCT MAN %  --   --   --   --  7  --   --       Results from last 7 days  Lab Units 02/11/18  1658 02/11/18  1605 02/11/18  0524 02/10/18  0553 02/09/18  1034   SODIUM mmol/L 141  --  140 140 140   POTASSIUM mmol/L 3 7  --  3 7 3 7 3 4*   CHLORIDE mmol/L 111*  --  109* 109* 108   CO2 mmol/L 19*  --  20* 23 23   BUN mg/dL 39*  --  34* 22 22   CREATININE mg/dL 2 06*  --  1 91* 1 05 1 09   CALCIUM mg/dL 7 2*  --  8 0* 8 4 8 3   TOTAL PROTEIN g/dL  --   --  6 0* 6 5 6 6   BILIRUBIN TOTAL mg/dL  --   --  15 01* 14 64* 15 32*   ALK PHOS U/L  --   --  255* 309* 329*   ALT U/L  --   --  74 62 65   AST U/L  --   --  118* 74* 80*   GLUCOSE RANDOM mg/dL 119  --  117 102 112   GLUCOSE, ISTAT mg/dl  --  134  --   --   --                 Results from last 7 days  Lab Units 02/11/18  1658   INR  4 22*   PTT seconds 64*       Results from last 7 days  Lab Units 02/11/18  1659   LACTIC ACID mmol/L 4 8*     No results found for: TROPONINI     Imaging:  I have personally reviewed pertinent reports  EKG: This was personally reviewed by myself  Micro:  No results found for: Zaina Pratt, SPUTUMCULTUR    Assessment: 71 y o  male with past medical history significant adenocarcinoma of unknown origin with pancreatic Mets presents for evaluation of hypotension and her abdominal bleeding  Plan:                  Neuro:  Alert oriented x 0, Most likely secondary to hypovolemia vs less likely infection  No other active issues at this time                    CV:  Map goal greater than 65  Currently map is 70  Status post 2 5 L normal saline, status post 2 units packed red blood cells will give 1 more unit  Lung:  No active issues continue to monitor     ABG showed metabolic acidosis                GI:   GI prophylaxis continue Pepcid 20 mg p o   Daily                   FEN:    Status post 2 5 L normal saline  Electrolytes within normal limits, replete as necessary  Continue NPO given altered mental status                     :  Monitor I/O's                 ID:  Leukocytosis, will continue to trend  Likely secondary stressed and inflammation  Afebrile no indication for antibiotics currently  If patient remains hypotension may possibly culture given lactate of 4 8  F/u repeat lactate                 Heme:      1  Anemia    Hemoglobin approximately 7 status post 2 units packed RBCs  Will give 1 more unit of packed red blood cells  2  Metastatic adenocarcinoma   Patient went to the his PCP approximately 2-3 weeks ago and had an outpatient CT scan which reported 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 origin involving upper abdominal organs, mentum, peritoneum, long segment of sigmoid colon  Bx from mass near liver resulted positive for adenocarcinoma suspicious of pancreatic or biliary origin 02/02/2018, likely Stage IV biliary/pancreatic CA with tumor markers- CA 19-9 7317, CEA 4 1  Patient was full code,   Had thorough conversation with son and girlfriend  Will now be DNR/DNI    3  Elevated INR   INR 4 2, continue to  monitor   No need for FFP                 Endo:  No active issues   Glucose within normal limits  Continue to monitor                 Msk/Skin:   Turn and reposition, assess for skin breakdown                 Disposition:  Continue ICU level care     VTE Pharmacologic Prophylaxis: Sequential compression device (Venodyne)   VTE Mechanical Prophylaxis: sequential compression device     Invasive lines and devices:   Invasive Devices     Peripheral Intravenous Line            Peripheral IV 02/10/18 Right Wrist 1 day    Peripheral IV 02/10/18 Left Antecubital less than 1 day    Peripheral IV 02/11/18 Right Antecubital less than 1 day          Drain            Closed/Suction Drain Left Abdomen Bulb 10 2 Fr  5 days    Closed/Suction Drain Right Abdomen Bulb 10 2 Fr  5 days    Nephrostomy Left 1 8 5 Fr  5 days                 Code Status: Level 1 - Full Code  POA:    POLST:       Given critical illness, patient length of stay will require greater than two midnights  Counseling / Coordination of Care  Total time spent today 60 minutes  Greater than 50% of total time was spent with the patient and / or family counseling and / or coordination of care  A description of the counseling / coordination of care: see hpi     Portions of the record may have been created with voice recognition software  Occasional wrong word or "sound a like" substitutions may have occurred due to the inherent limitations of voice recognition software  Read the chart carefully and recognize, using context, where substitutions have occurred          Polly Lei MD

## 2018-02-11 NOTE — PROGRESS NOTES
Patient transferred to ICU  Bleeding increased after holding pressure with both Surgery resident and Rn  While in room holding pressure, patients eyes not focused and glazed, not responding to verbal commands  Vitals obtained and rapid response called  Fluids open wide, NSR on screen around 100, placed on 02, labs sent and patient sent to CT scan

## 2018-02-11 NOTE — PROGRESS NOTES
Progress Note - Milind Haynes 1945, 71 y o  male MRN: [id number]  Unit/Bed#: MS [facility] Encounter: [id number]  Primary Care Provider: Driss Tom DO   Date and time admitted to hospital: 1/31/2018  7:35 PM    * Jaundice   Assessment & Plan    · Painless jaundice, mainly obstructive + some infiltrative component 2/2 extrinsic tumor/masses  · Bilirubin stable around 13-15  · Obstruction secondary to mass s/p biopsy 2/2/18  · S/p 2 percutaneous biliary drains placed on 2/6/18   · D/w GI in detail 2/9, unfortunately not much else they can do from GI standpoint, will need OP onc follow up  · S/p IR tube check 2/9/18 - patent  · CT abdomen/pelvis - tubes patent 2/10  · Having bleeding and yellow drainage at site of perc biliary drain - will ask GI to evaluate today if able, otherwise will need IR re-eval tomorrow, possible that suture has been removed or drain migrated while changing in position in bed  · Pain control with oxycodone, dose was decreased due to confusion, appreciate palliative med consult   · Plan was to DC home with VNA but may be candidate for hospice if he continues to decline ?         Metastatic adenocarcinoma ([facility] [address] )   Assessment & Plan    · CT revealed 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 with involvement of upper abdominal organs including the tail of the pancreas, omentum, peritoneal margin, left para-aortic nodes and long segment of sigmoid colon   · Repeat CT ab/pelv 2/10- stable but now with interval increase in moderate ascites   · Will place c/s to IR for dx and tx paracentesis   · Bx from mass near liver resulted positive for adenocarcinoma suspicious of pancreatic or biliary origin 02/02/2018, likely Stage IV biliary/pancreatic CA with tumor markers- CA 19-9 7317, CEA 4 1  · Palliative care consult for pain management today - may address [address] tomorrow   · Appreciate Oncology input   · D/w IR Friday and they recommend port-a-cath can be arranged as outpatient   · Will ask for onc re-eval tomorrow         Other hydronephrosis   Assessment & Plan    · Lt sided tortuous ureter but no clear obstruction  · Suspected 2/2 infiltrative tumour causing extrinsic compression  · S/p perc neprhostomy on left  · Urology rec f/u in 1 month to discuss possible conversion to ureteral stent  · Status post left-sided percutaneous nephrostomy placed on 02/06/2018  · Patient will have visiting nurses        Weight loss, non-intentional   Assessment & Plan    · Secondary to above  · Nutritionist consult appreciated, ensure clear tid ordered        Chronic obstructive pulmonary disease (HCC)   Assessment & Plan    · Continue albuterol  · No acute exacerbation         Goals of care, counseling/discussion   Assessment & Plan    · Patient had plan to follow up with Dr Dom Laguerre tomorrow to address his options in regards to chemo  · Discussed in detail with family today, patient and SO interested in chemo but would like to d/w onc - will ask onc to evaluate patient tomorrow   · Given that his status has declined significantly over the weekend, patient may now not be candidate for treatment   · Palliative care input appreciated for pain management, will readdress goals w patient and SO, may be candidate for hospice if no improvement in clinical status ?   · Plan for paracentesis tomorrow of abdominal ascites   · May benefit from family meeting in near future         Generalized abdominal pain   Assessment & Plan    · CT abdomen and pelvis with increasing ascites - will place IR c/s for paracentesis   · Continue dec dose of oxy and dilaudid prn - will appreciate pall med c/s for pain mgnt   · Continue bowel regimen for constipation while on opioids   · Continue pepcid   · LFT monitoring         DANGELO (acute kidney injury) ([facility] [address] )   Assessment & Plan    · Not POA   · Has R PCN in place functioning well, no hydro on CT scan   · Suspect prerenal as patient has had poor appetite over last few days, dye load yesterday  · Gentle IVF hydration   · Monitor UO        Lactic acidosis   Assessment & Plan    · This has trended down   · Low suspicion for mesenteric ischemia   · IVF hydration         Leukocytosis   Assessment & Plan    · Will continue to trend   · Likely secondary to stress, inflammation   · Afebrile - abx not currently indicated   · Monitor output from perc biliary drains - non purulent           VTE Pharmacologic Prophylaxis:   Pharmacologic: Enoxaparin (Lovenox)  Mechanical VTE Prophylaxis in Place: Yes    Patient Centered Rounds: I have performed bedside rounds with nursing staff today  Discussions with Specialists or Other Care Team Provider: nursing, palliative medicine     Education and Discussions with Family / Patient: patient, wife at bedside     Time Spent for Care: 30 minutes  More than 50% of total time spent on counseling and coordination of care as described above  Current Length of Stay: 11 day(s)    Current Patient Status: Inpatient   Certification Statement: The patient will continue to require additional inpatient hospital stay due to intractable pain, IR consult, not medically stable    Discharge Plan / Estimated Discharge Date: not medically stable     Code Status: Level 1 - Full Code      Subjective:   Patient seen examined at bedside  Pain slightly improved today however abdomen seems more distended  He is very weak and confused at times likely secondary to opioids  He tells me that he wants to see what his options are in regards to chemo  He tells me he knows he is dying and that he has friends who have had pancreatic cancer  Nursing has to use wheelchair to get him to bathroom  He is very uncomfortable in bed  Objective:     Vitals:   Temp (24hrs), Av 1 °F (36 7 °C), Min:97 6 °F (36 4 °C), Max:98 7 °F (37 1 °C)    HR:  [75-81] 75  Resp:  [20] 20  BP: (121-136)/(58-83) 121/58  SpO2:  [95 %-96 %] 95 %  Body mass index is 19 04 kg/m²       Input and Output Summary (last 24 hours): Intake/Output Summary (Last 24 hours) at 02/11/18 1117  Last data filed at 02/11/18 0842   Gross per 24 hour   Intake           1027 5 ml   Output              600 ml   Net            427 5 ml       Physical Exam:     Physical Exam   Constitutional: He is oriented to person, place, and time  He appears well-developed  He appears distressed  Cachectic    HENT:   Head: Normocephalic and atraumatic  Mouth/Throat: Oropharynx is clear and moist    Eyes: EOM are normal  Scleral icterus is present  Neck: Normal range of motion  Neck supple  Cardiovascular: Normal rate, regular rhythm and normal heart sounds  No murmur heard  Pulmonary/Chest: Effort normal and breath sounds normal    Poor inspiratory effort secondary to abdominal pain    Abdominal: Soft  Bowel sounds are normal  He exhibits distension  There is tenderness (diffusely )  Two perc biliary drains, midline drain with bleeding and biliary drainage around tube    Genitourinary:   Genitourinary Comments: Left nephrostomy tube    Musculoskeletal: Normal range of motion  He exhibits no edema  Neurological: He is alert and oriented to person, place, and time  Skin: Skin is warm and dry  Jaundiced skin   Psychiatric: He has a normal mood and affect  Nursing note and vitals reviewed  Additional Data:     Labs:      Results from last 7 days  Lab Units 02/11/18  0524   WBC Thousand/uL 16 79*   HEMOGLOBIN g/dL 8 1*   HEMATOCRIT % 23 8*   PLATELETS Thousands/uL 246   NEUTROS PCT % 89*   LYMPHS PCT % 7*   MONOS PCT % 4   EOS PCT % 0       Results from last 7 days  Lab Units 02/11/18  0524   SODIUM mmol/L 140   POTASSIUM mmol/L 3 7   CHLORIDE mmol/L 109*   CO2 mmol/L 20*   BUN mg/dL 34*   CREATININE mg/dL 1 91*   CALCIUM mg/dL 8 0*   TOTAL PROTEIN g/dL 6 0*   BILIRUBIN TOTAL mg/dL 15 01*   ALK PHOS U/L 255*   ALT U/L 74   AST U/L 118*   GLUCOSE RANDOM mg/dL 117           * I Have Reviewed All Lab Data Listed Above    * Additional Pertinent Lab Tests Reviewed: [address] Admission Reviewed    Imaging:    Imaging Reports Reviewed Today Include: ct abdomen pelvis  Imaging Personally Reviewed by Myself Includes:  none    Recent Cultures (last 7 days):           Last 24 Hours Medication List:     Current Facility-Administered Medications:  acetaminophen 650 mg Oral Q8H [facility] Tiffanie Shah MD    albuterol 2 puff Inhalation Q4H PRN Farhan Peguero MD    docusate sodium 100 mg Oral BID Farhan Peguero MD    enoxaparin 40 mg Subcutaneous Daily Farhan Peguero MD    famotidine 20 mg Oral Daily Zabrina Hay MD    hydrALAZINE 5 mg Intravenous Q6H PRN Jonny Hdez PA-C    HYDROmorphone 0 2 mg Intravenous Q4H PRN Tiffanie Shah MD    morphine injection 4 mg Intravenous Once Micheal Angeles DO    ondansetron 4 mg Intravenous Q6H PRN Farhan Peguero MD    oxyCODONE 7 5 mg Oral Q6H Tiffanie Shah MD    oxyCODONE 2 5 mg Oral Q4H PRN Ras Hdez PA-C    oxyCODONE 5 mg Oral Q6H PRN Jonny Hdez PA-C    polyethylene glycol 17 g Oral Daily Farhan Peguero MD    senna 1 tablet Oral Daily Farhan Peguero MD    simethicone 80 mg Oral Q6H PRN Jonny Hdez PA-C    sodium chloride 75 mL/hr Intravenous Continuous Jonny Hdez PA-C Last Rate: 75 mL/hr (02/11/18 0335)        Today, Patient Was Seen By: Kareem Boland PA-C    ** Please Note: This note has been constructed using a voice recognition system   **

## 2018-02-12 LAB
ABO GROUP BLD BPU: NORMAL
ALBUMIN SERPL BCP-MCNC: 1.4 G/DL (ref 3.5–5)
ALP SERPL-CCNC: 194 U/L (ref 46–116)
ALT SERPL W P-5'-P-CCNC: 55 U/L (ref 12–78)
ANION GAP SERPL CALCULATED.3IONS-SCNC: 9 MMOL/L (ref 4–13)
ARTERIAL PATENCY WRIST A: YES
AST SERPL W P-5'-P-CCNC: 64 U/L (ref 5–45)
BASE EXCESS BLDA CALC-SCNC: -6.1 MMOL/L
BASOPHILS # BLD AUTO: 0.02 THOUSANDS/ΜL (ref 0–0.1)
BASOPHILS # BLD AUTO: 0.03 THOUSANDS/ΜL (ref 0–0.1)
BASOPHILS NFR BLD AUTO: 0 % (ref 0–1)
BASOPHILS NFR BLD AUTO: 0 % (ref 0–1)
BILIRUB SERPL-MCNC: 14.66 MG/DL (ref 0.2–1)
BPU ID: NORMAL
BUN SERPL-MCNC: 38 MG/DL (ref 5–25)
CALCIUM SERPL-MCNC: 7.5 MG/DL (ref 8.3–10.1)
CHLORIDE SERPL-SCNC: 112 MMOL/L (ref 100–108)
CO2 SERPL-SCNC: 22 MMOL/L (ref 21–32)
CREAT SERPL-MCNC: 1.71 MG/DL (ref 0.6–1.3)
CROSSMATCH: NORMAL
CROSSMATCH: NORMAL
EOSINOPHIL # BLD AUTO: 0.01 THOUSAND/ΜL (ref 0–0.61)
EOSINOPHIL # BLD AUTO: 0.03 THOUSAND/ΜL (ref 0–0.61)
EOSINOPHIL NFR BLD AUTO: 0 % (ref 0–6)
EOSINOPHIL NFR BLD AUTO: 0 % (ref 0–6)
ERYTHROCYTE [DISTWIDTH] IN BLOOD BY AUTOMATED COUNT: 19.7 % (ref 11.6–15.1)
ERYTHROCYTE [DISTWIDTH] IN BLOOD BY AUTOMATED COUNT: 21 % (ref 11.6–15.1)
GFR SERPL CREATININE-BSD FRML MDRD: 39 ML/MIN/1.73SQ M
GLUCOSE SERPL-MCNC: 86 MG/DL (ref 65–140)
HCO3 BLDA-SCNC: 17.8 MMOL/L (ref 22–28)
HCT VFR BLD AUTO: 25.1 % (ref 36.5–49.3)
HCT VFR BLD AUTO: 28.6 % (ref 36.5–49.3)
HGB BLD-MCNC: 10 G/DL (ref 12–17)
HGB BLD-MCNC: 8.9 G/DL (ref 12–17)
INR PPP: 2.21 (ref 0.86–1.16)
LYMPHOCYTES # BLD AUTO: 0.91 THOUSANDS/ΜL (ref 0.6–4.47)
LYMPHOCYTES # BLD AUTO: 1.14 THOUSANDS/ΜL (ref 0.6–4.47)
LYMPHOCYTES NFR BLD AUTO: 6 % (ref 14–44)
LYMPHOCYTES NFR BLD AUTO: 8 % (ref 14–44)
MCH RBC QN AUTO: 31.2 PG (ref 26.8–34.3)
MCH RBC QN AUTO: 31.4 PG (ref 26.8–34.3)
MCHC RBC AUTO-ENTMCNC: 35 G/DL (ref 31.4–37.4)
MCHC RBC AUTO-ENTMCNC: 35.5 G/DL (ref 31.4–37.4)
MCV RBC AUTO: 88 FL (ref 82–98)
MCV RBC AUTO: 90 FL (ref 82–98)
MONOCYTES # BLD AUTO: 0.9 THOUSAND/ΜL (ref 0.17–1.22)
MONOCYTES # BLD AUTO: 0.92 THOUSAND/ΜL (ref 0.17–1.22)
MONOCYTES NFR BLD AUTO: 6 % (ref 4–12)
MONOCYTES NFR BLD AUTO: 7 % (ref 4–12)
NEUTROPHILS # BLD AUTO: 11.36 THOUSANDS/ΜL (ref 1.85–7.62)
NEUTROPHILS # BLD AUTO: 14.74 THOUSANDS/ΜL (ref 1.85–7.62)
NEUTS SEG NFR BLD AUTO: 85 % (ref 43–75)
NEUTS SEG NFR BLD AUTO: 88 % (ref 43–75)
NON VENT ROOM AIR: 21 %
NRBC BLD AUTO-RTO: 0 /100 WBCS
NRBC BLD AUTO-RTO: 0 /100 WBCS
O2 CT BLDA-SCNC: 13.2 ML/DL (ref 16–23)
OXYHGB MFR BLDA: 93.6 % (ref 94–97)
PCO2 BLDA: 29.4 MM HG (ref 36–44)
PH BLDA: 7.4 [PH] (ref 7.35–7.45)
PLATELET # BLD AUTO: 168 THOUSANDS/UL (ref 149–390)
PLATELET # BLD AUTO: 192 THOUSANDS/UL (ref 149–390)
PMV BLD AUTO: 10.2 FL (ref 8.9–12.7)
PMV BLD AUTO: 10.6 FL (ref 8.9–12.7)
PO2 BLDA: 66 MM HG (ref 75–129)
POTASSIUM SERPL-SCNC: 3.3 MMOL/L (ref 3.5–5.3)
PROT SERPL-MCNC: 5.5 G/DL (ref 6.4–8.2)
PROTHROMBIN TIME: 24.8 SECONDS (ref 12.1–14.4)
RBC # BLD AUTO: 2.85 MILLION/UL (ref 3.88–5.62)
RBC # BLD AUTO: 3.18 MILLION/UL (ref 3.88–5.62)
SODIUM SERPL-SCNC: 143 MMOL/L (ref 136–145)
SPECIMEN SOURCE: ABNORMAL
UNIT DISPENSE STATUS: NORMAL
UNIT PRODUCT CODE: NORMAL
UNIT RH: NORMAL
WBC # BLD AUTO: 13.53 THOUSAND/UL (ref 4.31–10.16)
WBC # BLD AUTO: 16.69 THOUSAND/UL (ref 4.31–10.16)

## 2018-02-12 PROCEDURE — 85610 PROTHROMBIN TIME: CPT | Performed by: INTERNAL MEDICINE

## 2018-02-12 PROCEDURE — 86927 PLASMA FRESH FROZEN: CPT

## 2018-02-12 PROCEDURE — 99232 SBSQ HOSP IP/OBS MODERATE 35: CPT | Performed by: ANESTHESIOLOGY

## 2018-02-12 PROCEDURE — 99232 SBSQ HOSP IP/OBS MODERATE 35: CPT | Performed by: SURGERY

## 2018-02-12 PROCEDURE — 99232 SBSQ HOSP IP/OBS MODERATE 35: CPT | Performed by: INTERNAL MEDICINE

## 2018-02-12 PROCEDURE — 87040 BLOOD CULTURE FOR BACTERIA: CPT | Performed by: EMERGENCY MEDICINE

## 2018-02-12 PROCEDURE — 99233 SBSQ HOSP IP/OBS HIGH 50: CPT | Performed by: INTERNAL MEDICINE

## 2018-02-12 PROCEDURE — 87186 SC STD MICRODIL/AGAR DIL: CPT | Performed by: EMERGENCY MEDICINE

## 2018-02-12 PROCEDURE — 80053 COMPREHEN METABOLIC PANEL: CPT | Performed by: PHYSICIAN ASSISTANT

## 2018-02-12 PROCEDURE — 85025 COMPLETE CBC W/AUTO DIFF WBC: CPT | Performed by: PHYSICIAN ASSISTANT

## 2018-02-12 PROCEDURE — 87077 CULTURE AEROBIC IDENTIFY: CPT | Performed by: EMERGENCY MEDICINE

## 2018-02-12 PROCEDURE — 36600 WITHDRAWAL OF ARTERIAL BLOOD: CPT

## 2018-02-12 PROCEDURE — 85025 COMPLETE CBC W/AUTO DIFF WBC: CPT | Performed by: SURGERY

## 2018-02-12 PROCEDURE — P9017 PLASMA 1 DONOR FRZ W/IN 8 HR: HCPCS

## 2018-02-12 RX ORDER — DOCUSATE SODIUM 100 MG/1
100 CAPSULE, LIQUID FILLED ORAL 2 TIMES DAILY
Status: DISCONTINUED | OUTPATIENT
Start: 2018-02-12 | End: 2018-02-14 | Stop reason: HOSPADM

## 2018-02-12 RX ORDER — SENNOSIDES 8.6 MG
1 TABLET ORAL
Status: DISCONTINUED | OUTPATIENT
Start: 2018-02-12 | End: 2018-02-14 | Stop reason: HOSPADM

## 2018-02-12 RX ORDER — OXYCODONE HYDROCHLORIDE 5 MG/1
5 TABLET ORAL EVERY 6 HOURS PRN
Status: DISCONTINUED | OUTPATIENT
Start: 2018-02-12 | End: 2018-02-13

## 2018-02-12 RX ORDER — POTASSIUM CHLORIDE 14.9 MG/ML
20 INJECTION INTRAVENOUS ONCE
Status: DISCONTINUED | OUTPATIENT
Start: 2018-02-12 | End: 2018-02-12

## 2018-02-12 RX ORDER — POTASSIUM CHLORIDE 14.9 MG/ML
20 INJECTION INTRAVENOUS ONCE
Status: DISCONTINUED | OUTPATIENT
Start: 2018-02-12 | End: 2018-02-13

## 2018-02-12 RX ORDER — OXYCODONE HYDROCHLORIDE 10 MG/1
10 TABLET ORAL EVERY 6 HOURS
Status: DISCONTINUED | OUTPATIENT
Start: 2018-02-12 | End: 2018-02-13

## 2018-02-12 RX ORDER — LORAZEPAM 1 MG/1
2 TABLET ORAL EVERY 8 HOURS PRN
Status: DISCONTINUED | OUTPATIENT
Start: 2018-02-12 | End: 2018-02-13

## 2018-02-12 RX ADMIN — HYDROMORPHONE HYDROCHLORIDE 0.2 MG: 1 INJECTION, SOLUTION INTRAMUSCULAR; INTRAVENOUS; SUBCUTANEOUS at 02:46

## 2018-02-12 RX ADMIN — FAMOTIDINE 20 MG: 20 TABLET, FILM COATED ORAL at 10:24

## 2018-02-12 RX ADMIN — ACETAMINOPHEN 650 MG: 325 TABLET, FILM COATED ORAL at 10:20

## 2018-02-12 RX ADMIN — OXYCODONE HYDROCHLORIDE 10 MG: 10 TABLET ORAL at 23:24

## 2018-02-12 RX ADMIN — OXYCODONE HYDROCHLORIDE 7.5 MG: 5 TABLET ORAL at 10:19

## 2018-02-12 RX ADMIN — OXYCODONE HYDROCHLORIDE 10 MG: 10 TABLET ORAL at 17:44

## 2018-02-12 RX ADMIN — HYDROMORPHONE HYDROCHLORIDE 0.5 MG: 1 INJECTION, SOLUTION INTRAMUSCULAR; INTRAVENOUS; SUBCUTANEOUS at 18:54

## 2018-02-12 RX ADMIN — POTASSIUM CHLORIDE 20 MEQ: 200 INJECTION, SOLUTION INTRAVENOUS at 10:19

## 2018-02-12 NOTE — PROGRESS NOTES
Progress Note - Critical Care   Conner Boswell 71 y o  male MRN: [id number]  Unit/Bed#: ICU 10 Encounter: [id number]    Assessment: 71 y o  male with past medical history significant adenocarcinoma of unknown origin with pancreatic Mets presents for evaluation of hypotension and her abdominal bleeding  Palliative care met with patient today and had extensive meeting with family today while I was present in the room  Plan is make the patient comfort care and patient, patients son, and family present were all in agreement with plan  Currently plans are being made to find the patient a hospice bed          Plan:                  Neuro:      1  Acute encephalopathy  Improved but still Alert oriented x 0, Most likely secondary to hypovolemia vs less likely infection  No other active issues at this time                               CV:      1  Hypotension  Map goal > 65  MAP ranged from  the last 24 hours  Currently MAP 72  Status post 2 5 L normal saline, status post 3 units packed red blood cells  Currently on maintenance fluids 75 mL an hour                 Lung:    · No active issues continue to monitor  · ABG showed metabolic acidosis                GI:   · GI prophylaxis continue Pepcid 20 mg p o  Daily                    FEN:    · Status post 2 5 L normal saline  · Electrolytes within normal limits, replete as necessary  · Continue NPO given altered mental status                       :   ·  Monitor I/O's  · +3 6 L last 24 hrs                 ID:   ·  Leukocytosis 16 7 ----> 13 5,  Likely secondary stressed and inflammation  · Afebrile no indication for antibiotics currently  lactate has now normalized  Blood cultures currently pending to rule out infection                 Heme:      1  Acute blood loss anemia     Hemoglobin 8 9 status post 3 units packed RBCs  No obvious source of bleed  CTA abdomen pelvis could not delineate any source of bleed but final read pending    2   Metastatic adenocarcinoma  ·  Patient went to the his PCP approximately 2-3 weeks ago and had an outpatient CT scan which reported 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 origin involving upper abdominal organs, mentum, peritoneum, long segment of sigmoid colon  · Bx from mass near liver resulted positive for adenocarcinoma suspicious of pancreatic or biliary origin 02/02/2018, likely Stage IV biliary/pancreatic CA with tumor markers- CA 19-9 7317, CEA 4 1       3  Elevated INR   INR 4 2, 2 FFP given  Down trended to 2 2                 Endo:      1  Metabolic acidosis  · Noted on ABG yesterday, continue to trend bicarb via BMP and follow up lactate normalized at 1 7      2  DANGELO  · Baseline creatinine 1 0 no previous history of CKD  Creatinine peaked at approximately 2, downtrending currently at 1 7  Most likely pre renal, continue maintenance fluids  Continue to monitor  · Glucose within normal limits  · Continue to monitor                 Msk/Skin:   Turn and reposition, assess for skin breakdown                 Disposition:  Continue ICU level care        HPI/24hr events: Pt seen and examined  PT was given 2 units of FFP overnight  No other issues overnight  BP stablized  Mental status has improved but still A&O x 0    Physical Exam:     Physical Exam   Constitutional: He appears well-developed and well-nourished  No distress  HENT:   Head: Normocephalic and atraumatic  Nose: Nose normal    Mouth/Throat: No oropharyngeal exudate  Eyes: Conjunctivae and EOM are normal  Pupils are equal, round, and reactive to light  No scleral icterus  Neck: Normal range of motion  No JVD present  Cardiovascular: Normal rate, regular rhythm, normal heart sounds and intact distal pulses  Exam reveals no gallop and no friction rub  No murmur heard  Pulmonary/Chest: Effort normal and breath sounds normal  No respiratory distress  He has no wheezes  He has no rales  He exhibits no tenderness  Abdominal: Soft  Bowel sounds are normal  He exhibits no distension  There is no tenderness  There is no guarding  Musculoskeletal: Normal range of motion  He exhibits no edema, tenderness or deformity  Lymphadenopathy:     He has no cervical adenopathy  Neurological: He displays normal reflexes  No cranial nerve deficit  Coordination normal    A&O x O   Skin: Skin is warm and dry  He is not diaphoretic  No erythema  Psychiatric: He has a normal mood and affect  Physical Exam   Constitutional: He appears well-developed and well-nourished  No distress  HENT:   Head: Normocephalic and atraumatic  Nose: Nose normal    Mouth/Throat: No oropharyngeal exudate  Eyes: Conjunctivae and EOM are normal  Pupils are equal, round, and reactive to light  No scleral icterus  Neck: Normal range of motion  No JVD present  Cardiovascular: Normal rate, regular rhythm, normal heart sounds and intact distal pulses  Exam reveals no gallop and no friction rub  No murmur heard  Pulmonary/Chest: Effort normal and breath sounds normal  No respiratory distress  He has no wheezes  He has no rales  He exhibits no tenderness  Abdominal: Soft  Bowel sounds are normal  He exhibits no distension  There is no tenderness  There is no guarding  Musculoskeletal: Normal range of motion  He exhibits no edema, tenderness or deformity  Lymphadenopathy:     He has no cervical adenopathy  Neurological: He displays normal reflexes  No cranial nerve deficit  Coordination normal    A&O x O   Skin: Skin is warm and dry  He is not diaphoretic  No erythema  Psychiatric: He has a normal mood and affect         Vitals:    18 0200 18 0212 18 0300 18 0400   BP: 152/75 152/75 125/68 103/59   Pulse: 78 74 72 68   Resp: 17 (!) 11 (!) 8 (!) 10   Temp:  97 9 °F (36 6 °C)  98 7 °F (37 1 °C)   TempSrc:  Oral  Oral   SpO2: 98%  98% 98%   Weight:       Height:                 Temperature:   Temp (24hrs), Av 1 °F (36 7 °C), Min:97 4 °F (36 3 °C), Max:99 2 °F (37 3 °C)    Current: Temperature: 98 7 °F (37 1 °C)    Weights:   IBW: 68 4 kg    Body mass index is 19 04 kg/m²  Weight (last 2 days)     None          Hemodynamic Monitoring:  ICP Mean:  , CPP:       Non-Invasive/Invasive Ventilation Settings:  Respiratory    Lab Data (Last 4 hours)    None         O2/Vent Data (Last 4 hours)    None              Lab Results   Component Value Date    PHART 7 399 02/11/2018    MZO0ZKP 29 4 (LL) 02/11/2018    PO2ART 66 0 (L) 02/11/2018    ZQR9DVB 17 8 (L) 02/11/2018    BEART -6 1 02/11/2018    SOURCE Radial, Left 02/11/2018     SpO2: SpO2: 98 %    Intake and Outputs:  I/O       02/10 0701 - 02/11 0700 02/11 0701 - 02/12 0700    P  O  180 240    I V  (mL/kg) 727 5 (12 8) 755 (13 3)    Blood  918    IV Piggyback  2000    Total Intake(mL/kg) 907 5 (16) 3913 (68 9)    Urine (mL/kg/hr) 400 (0 3) 175 (0 1)    Drains 200 (0 1) 245 (0 2)    Stool 0 (0) 0 (0)    Total Output 600 420    Net +307 5 +3493          Unmeasured Urine Occurrence 75 x 1 x    Unmeasured Stool Occurrence 2 x 1 x          Nutrition:        Diet Orders            Start     Ordered    02/11/18 1613  Diet NPO  Diet effective now     Question Answer Comment   Diet Type NPO    RD to adjust diet per protocol? Yes        02/11/18 1612            Labs:     Results from last 7 days  Lab Units 02/12/18  0453 02/12/18  0008 02/11/18  2230  02/11/18  1629  02/11/18  0524   WBC Thousand/uL 13 53* 16 69*  --   --  7 51  --  16 79*   HEMOGLOBIN g/dL 8 9* 10 0* 9 8*  < > 8 9*  --  8 1*   I STAT HEMOGLOBIN   --   --   --   --   --   < >  --    HEMATOCRIT % 25 1* 28 6* 28 1*  < > 26 6*  --  23 8*   PLATELETS Thousands/uL 168 192  --   --  268  --  246   NEUTROS PCT % 85* 88*  --   --   --   --  89*   MONOS PCT % 7 6  --   --   --   --  4   < > = values in this interval not displayed     Results from last 7 days  Lab Units 02/11/18  1658 02/11/18  1605 02/11/18  0524 02/10/18  0553 02/09/18  1034   SODIUM mmol/L 141  --  140 140 140   POTASSIUM mmol/L 3 7  --  3 7 3 7 3 4*   CHLORIDE mmol/L 111*  --  109* 109* 108   CO2 mmol/L 19*  --  20* 23 23   BUN mg/dL 39*  --  34* 22 22   CREATININE mg/dL 2 06*  --  1 91* 1 05 1 09   CALCIUM mg/dL 7 2*  --  8 0* 8 4 8 3   TOTAL PROTEIN g/dL  --   --  6 0* 6 5 6 6   BILIRUBIN TOTAL mg/dL  --   --  15 01* 14 64* 15 32*   ALK PHOS U/L  --   --  255* 309* 329*   ALT U/L  --   --  74 62 65   AST U/L  --   --  118* 74* 80*   GLUCOSE RANDOM mg/dL 119  --  117 102 112   GLUCOSE, ISTAT mg/dl  --  134  --   --   --                 Results from last 7 days  Lab Units 02/11/18  1658   INR  4 22*   PTT seconds 64*       Results from last 7 days  Lab Units 02/11/18  2230   LACTIC ACID mmol/L 1 9     No results found for: TROPONINI    Imaging:  I have personally reviewed pertinent reports  Micro:  No results found for: Emeka Squibb, SPUTUMCULTUR    Allergies:    Allergies   Allergen Reactions    Sulfa Antibiotics Rash       Medications:   Scheduled Meds:  Current Facility-Administered Medications:  acetaminophen 650 mg Oral Q8H [facility] Savi Truong MD    albuterol 2 puff Inhalation Q4H PRN Brianna Luna MD    docusate sodium 100 mg Oral BID Brianna Luna MD    famotidine 20 mg Oral Daily Zabrina Hay MD    hydrALAZINE 5 mg Intravenous Q6H PRN Jonny Hdez PA-C    HYDROmorphone 0 2 mg Intravenous Q3H PRN Gerhard Luu MD    ondansetron 4 mg Intravenous Q6H PRN Brianna Luna MD    oxyCODONE 7 5 mg Oral Q6H Savi Truong MD    oxyCODONE 2 5 mg Oral Q4H PRN Faviola Hdez PA-C    oxyCODONE 5 mg Oral Q6H PRN Jonny Hdez PA-C    polyethylene glycol 17 g Oral Daily Brianna List, MD    senna 1 tablet Oral Daily Brianna List, MD    simethicone 80 mg Oral Q6H PRN Jonny Hdez PA-C    sodium chloride 75 mL/hr Intravenous Continuous Nadira Balderrama DO Last Rate: 75 mL/hr (02/12/18 0400)     Continuous Infusions:  sodium chloride 75 mL/hr Last Rate: 75 mL/hr (02/12/18 0400)     PRN Meds:    albuterol 2 puff Q4H PRN   hydrALAZINE 5 mg Q6H PRN   HYDROmorphone 0 2 mg Q3H PRN   ondansetron 4 mg Q6H PRN   oxyCODONE 2 5 mg Q4H PRN   oxyCODONE 5 mg Q6H PRN   simethicone 80 mg Q6H PRN       VTE Pharmacologic Prophylaxis: Sequential compression device (Venodyne)   VTE Mechanical Prophylaxis: sequential compression device    Invasive lines and devices: Invasive Devices     Peripheral Intravenous Line            Peripheral IV 02/10/18 Left Antecubital 1 day    Peripheral IV 02/10/18 Right Wrist 1 day    Peripheral IV 02/11/18 Right Antecubital less than 1 day          Drain            Closed/Suction Drain Left Abdomen Other (Comment) 10 2 Fr  5 days    Closed/Suction Drain Right Abdomen  10 2 Fr  5 days    Nephrostomy Left 1 8 5 Fr  5 days                   Counseling / Coordination of Care  Total time spent today 30 minutes  Greater than 50% of total time was spent with the patient and / or family counseling and / or coordination of care  A description of the counseling / coordination of care: see hpi     Code Status: Level 3 - DNAR and DNI     Portions of the record may have been created with voice recognition software  Occasional wrong word or "sound a like" substitutions may have occurred due to the inherent limitations of voice recognition software  Read the chart carefully and recognize, using context, where substitutions have occurred       True Ma MD

## 2018-02-12 NOTE — PROGRESS NOTES
A discussion with Dr Kashif Sheets of Palliative care and Dr Polly Lei also from the critical care team took place and a decision was made with the patient and family for him to now be Level 4, comfort care  The Hospice liaison will work to find placement for the patient closer to home  Jammie Holley MD

## 2018-02-12 NOTE — PROGRESS NOTES
Progress Note - General Surgery  Echo Almonte 71 y o  male MRN: [id number]  Unit/Bed#: ICU 10-01 Encounter: [id number]    Assessment:  71y o -year-old male with bleeding around PTC drain placed for obstructive jaundice    Plan:  1  Bleeding around PTC drain   - continue drain   - correct coagulopathy and continue trending H&H   - f/u CTA read, although likely nothing to do operative or endovascular    2  DVT Prophylaxis   - pharmacologic held for bleeding around PTC drain and INR > 2   - SCDs    3  Disposition   - critical care per Kiki Woodson MD PGY-4  8:58 AM  02/12/18      Subjective:  Still confused  Continues with resuscitation  3 U pRBC and 2 FFP yesterday      Objective:  Patient Vitals for the past 24 hrs:   BP Temp Temp src Pulse Resp SpO2   02/12/18 0600 153/91 - - 78 20 99 %   02/12/18 0500 133/64 - - 76 14 99 %   02/12/18 0400 103/59 98 7 °F (37 1 °C) Oral 68 (!) 10 98 %   02/12/18 0300 125/68 - - 72 (!) 8 98 %   02/12/18 0212 152/75 97 9 °F (36 6 °C) Oral 74 (!) 11 -   02/12/18 0200 152/75 - - 78 17 98 %   02/12/18 0100 135/70 - - 72 (!) 11 99 %   02/12/18 0046 133/73 97 9 °F (36 6 °C) - 74 (!) 10 -   02/11/18 2345 117/61 98 7 °F (37 1 °C) Oral 72 (!) 8 98 %   02/11/18 2330 119/60 98 5 °F (36 9 °C) - 74 (!) 10 -   02/11/18 2130 106/65 97 6 °F (36 4 °C) Oral 70 (!) 6 100 %   02/11/18 2100 110/60 - - 70 (!) 6 100 %   02/11/18 2016 95/61 (!) 97 4 °F (36 3 °C) Axillary 70 (!) 7 100 %   02/11/18 2000 92/70 - - 70 (!) 6 100 %   02/11/18 1957 92/71 97 7 °F (36 5 °C) Axillary 73 (!) 8 -   02/11/18 1845 108/58 - - 76 (!) 7 100 %   02/11/18 1800 (!) 85/52 - - 76 (!) 9 100 %   02/11/18 1700 98/55 99 2 °F (37 3 °C) Tympanic 92 14 100 %   02/11/18 1623 99/56 - - - - -   02/11/18 1622 109/58 - - 96 - -   02/11/18 1605 130/66 - - (!) 11 - 98 %   02/11/18 1605 - - - (!) 112 - 99 %   02/11/18 1559 - - - (!) 118 - 96 %          Diet Orders            Start     Ordered    02/11/18 1613  Diet NPO Diet effective now     Question Answer Comment   Diet Type NPO    RD to adjust diet per protocol?  Yes        02/11/18 1612        Intake/Output Summary (Last 24 hours) at 02/12/18 0858  Last data filed at 02/12/18 0600   Gross per 24 hour   Intake             3943 ml   Output             1170 ml   Net             2773 ml        Physical Exam:  General: NAD, confused  Cardiovascular: RRR  Respiratory: breath sounds b/l  Abdomen: soft, mild tenderness over PTC drain, ND  Extremities: no edema    Medications:    Current Facility-Administered Medications:  acetaminophen 650 mg Oral Q8H [facility] Tiffanie Shah MD    albuterol 2 puff Inhalation Q4H PRN Farhan Peguero MD    docusate sodium 100 mg Oral BID Farhan Peguero MD    famotidine 20 mg Oral Daily Zabrina Hay MD    hydrALAZINE 5 mg Intravenous Q6H PRN Jonny Hdez PA-C    HYDROmorphone 0 2 mg Intravenous Q3H PRN Gerhard Luu MD    ondansetron 4 mg Intravenous Q6H PRN Farhan Peguero MD    oxyCODONE 7 5 mg Oral Q6H Tiffanie Shah MD    oxyCODONE 2 5 mg Oral Q4H PRN Jonny Hdez PA-C    oxyCODONE 5 mg Oral Q6H PRN Jonny Hdez PA-C    polyethylene glycol 17 g Oral Daily Farhan Peguero MD    potassium chloride 20 mEq Intravenous Once Gerhard Luu MD    potassium chloride 20 mEq Intravenous Once Gerhard Luu MD    senna 1 tablet Oral Daily Farhan Peguero MD    simethicone 80 mg Oral Q6H PRN Jonny Hdez PA-C    sodium chloride 75 mL/hr Intravenous Continuous Nadira Balderrama DO Last Rate: 75 mL/hr (02/12/18 0600)     sodium chloride 75 mL/hr Last Rate: 75 mL/hr (02/12/18 0600)     albuterol 2 puff Q4H PRN   hydrALAZINE 5 mg Q6H PRN   HYDROmorphone 0 2 mg Q3H PRN   ondansetron 4 mg Q6H PRN   oxyCODONE 2 5 mg Q4H PRN   oxyCODONE 5 mg Q6H PRN   simethicone 80 mg Q6H PRN     Laboratory results:   CBC:   Lab Results   Component Value Date    WBC 13 53 (H) 02/12/2018    HGB 8 9 (L) 02/12/2018    HCT 25 1 (L) 02/12/2018    MCV 88 02/12/2018     02/12/2018    MCH 31 2 02/12/2018    MCHC 35 5 02/12/2018    RDW 21 0 (H) 02/12/2018    MPV 10 6 02/12/2018    NRBC 0 02/12/2018   , CMP:   Lab Results   Component Value Date     02/12/2018    K 3 3 (L) 02/12/2018     (H) 02/12/2018    CO2 22 02/12/2018    ANIONGAP 9 02/12/2018    BUN 38 (H) 02/12/2018    CREATININE 1 71 (H) 02/12/2018    GLUCOSE 86 02/12/2018    GLUCOSE 134 02/11/2018    CALCIUM 7 5 (L) 02/12/2018    AST 64 (H) 02/12/2018    ALT 55 02/12/2018    ALKPHOS 194 (H) 02/12/2018    PROT 5 5 (L) 02/12/2018    BILITOT 14 66 (H) 02/12/2018    EGFR 39 02/12/2018   , Coagulation:   Lab Results   Component Value Date    INR 2 21 (H) 02/12/2018   , Urinalysis: No results found for: Ralph Bevel, SPECGRAV, PHUR, LEUKOCYTESUR, NITRITE, PROTEINUA, GLUCOSEU, KETONESU, BILIRUBINUR, BLOODU, Amylase: No results found for: AMYLASE, Lipase: No results found for: LIPASE    VTE Pharmacologic Prophylaxis: Reason for no pharmacologic prophylaxis bleeding around PTC drain, INR > 2  VTE Mechanical Prophylaxis: sequential compression device

## 2018-02-12 NOTE — SOCIAL WORK
CM received update from Dr Caroline Lee with Palliative  After family meeting decision was made for hospice  As per Dr Caroline Lee pt is not IPU appropriate at this time  CM met with pt family - SO Andres Wiggins, son, and brother to review SNF vs home with Hospice  CM reviewed SNF OOP cost for room and board  As per pt g/f they are unable to accommodate home hospice  CM provided family with general SNF list with starred in-network facilities

## 2018-02-12 NOTE — PROGRESS NOTES
Progress note - Palliative and Supportive Care   Malik Henning 71 y o  male [id number]    Assessment:     Patient Active Problem List   Diagnosis    Chronic low back pain    Chronic obstructive pulmonary disease (HCC)    Degenerative joint disease (DJD) of hip    Fatigue    Generalized abdominal pain    GERD (gastroesophageal reflux disease)    Jaundice    Left foot pain    Plantar fasciitis, left    Poor dentition    Right hip pain    Abnormal CT of the abdomen    Abnormal LFTs    Weight loss, non-intentional    UTI (urinary tract infection)    Liver mass    Other hydronephrosis    Metastatic adenocarcinoma (HCC)    Goals of care, counseling/discussion    DANGELO (acute kidney injury) ([facility] [address] )    Lactic acidosis    Leukocytosis         Plan:  1  Symptom management -    Pain mgmt:  Increased patient's dilaudid PRN and will continue with oral scheduled  Patient should still have paracentesis and evaluation for Tenckhoff drain if possible  2  Goals -extensive goals of care discussion as below  Patient and family have elected hospice services  They would like to focus on comfort care no further workup for malignancy  -     Code Status: Level 4    Decisional apparatus:  Patient is competent on my exam today  If competence is lost, patient's substitute decision maker would default to son by [place]  Advance Directive / Living Will / POLST:  None      Interval history:  Patient had been full past 24 hours  He had abdominal bleeding in the area of his drains, and was upgraded to the ICU  Critical care team had a productive family meeting and patient was made DNR/DNI  I spoke to the patient today as well as his significant other, son, and brother  Patient is aware of his very poor prognosis  He would like to focus on comfort  We discussed hospice services and patient is agreeable    As far as symptoms go, he continues to have pain, but feels it is overall better controlled  MEDICATIONS / ALLERGIES:    all current active meds have been reviewed and current meds:   Current Facility-Administered Medications   Medication Dose Route Frequency    LORazepam (ATIVAN) tablet 2 mg  2 mg Oral Q8H PRN    oxyCODONE (ROXICODONE) IR tablet 5 mg  5 mg Oral Q6H PRN       Allergies   Allergen Reactions    Sulfa Antibiotics Rash       OBJECTIVE:    Physical Exam  Physical Exam   Constitutional: He appears cachectic  He is cooperative  He has a sickly appearance  No distress  HENT:   Right Ear: External ear normal    Left Ear: External ear normal    Nose: Nose normal    Temporal wasting   Eyes: Lids are normal  Right eye exhibits no discharge  Left eye exhibits no discharge  Scleral icterus is present  Neck: No JVD present  Pulmonary/Chest: Effort normal  No respiratory distress  Abdominal: Soft  He exhibits no distension  There is no tenderness  Musculoskeletal:   No edema   Neurological: He is alert  Skin: Skin is warm and dry  He is not diaphoretic  No pallor  jaundice   Psychiatric: He has a normal mood and affect  Nursing note and vitals reviewed  Lab Results:   I have personally reviewed pertinent labs  , CBC:   Lab Results   Component Value Date    WBC 13 53 (H) 02/12/2018    HGB 8 9 (L) 02/12/2018    HCT 25 1 (L) 02/12/2018    MCV 88 02/12/2018     02/12/2018    MCH 31 2 02/12/2018    MCHC 35 5 02/12/2018    RDW 21 0 (H) 02/12/2018    MPV 10 6 02/12/2018    NRBC 0 02/12/2018   , CMP:   Lab Results   Component Value Date     02/12/2018    K 3 3 (L) 02/12/2018     (H) 02/12/2018    CO2 22 02/12/2018    ANIONGAP 9 02/12/2018    BUN 38 (H) 02/12/2018    CREATININE 1 71 (H) 02/12/2018    GLUCOSE 86 02/12/2018    GLUCOSE 134 02/11/2018    CALCIUM 7 5 (L) 02/12/2018    AST 64 (H) 02/12/2018    ALT 55 02/12/2018    ALKPHOS 194 (H) 02/12/2018    PROT 5 5 (L) 02/12/2018    BILITOT 14 66 (H) 02/12/2018    EGFR 39 02/12/2018     Imaging Studies: Reviewed CTA, showing continued metastatic malignancy as well as percutaneous biliary drain and percutaneous nephrostomy     EKG, Pathology, and Other Studies: n/a    Counseling / Coordination of Care  Total floor / unit time spent today 30+ minutes  Greater than 50% of total time was spent with the patient and / or family counseling and / or coordination of care   A description of the counseling / coordination of care:   Advance care plannin, family meeting, hospice discussion, symptom assessment, symptom management

## 2018-02-12 NOTE — PLAN OF CARE
COPING     Pt/Family able to verbalize concerns and demonstrate effective coping strategies Progressing        DEATH & DYING     Pt/Family communicate acceptance of impending death and expresses psychological comfort and peace Progressing        DISCHARGE PLANNING     Discharge to home or other facility with appropriate resources Progressing        INFECTION - ADULT     Absence or prevention of progression during hospitalization Progressing        Nutrition/Hydration-ADULT     Nutrient/Hydration intake appropriate for improving, restoring or maintaining nutritional needs Progressing        PAIN - ADULT     Verbalizes/displays adequate comfort level or baseline comfort level Progressing        Potential for Falls     Patient will remain free of falls Progressing        Prexisting or High Potential for Compromised Skin Integrity     Skin integrity is maintained or improved Progressing        SAFETY ADULT     Patient will remain free of falls Progressing     Maintain or return to baseline ADL function Progressing     Maintain or return mobility status to optimal level Progressing

## 2018-02-12 NOTE — PROGRESS NOTES
Progress Note - ICU Transfer to Step down/med  surg  Starr Dinh 71 y o  male MRN: [id number]    Unit/Bed#: ICU 10 Encounter: [id number]      Code Status: Level 4 - Comfort Care    Date of ICU admission: 2/11/18    Reason for ICU adm: Rapid response for acute blood loss     Active problems:   Patient Active Problem List   Diagnosis    Chronic low back pain    Chronic obstructive pulmonary disease (HCC)    Degenerative joint disease (DJD) of hip    Fatigue    Generalized abdominal pain    GERD (gastroesophageal reflux disease)    Jaundice    Left foot pain    Plantar fasciitis, left    Poor dentition    Right hip pain    Abnormal CT of the abdomen    Abnormal LFTs    Weight loss, non-intentional    UTI (urinary tract infection)    Liver mass    Other hydronephrosis    Metastatic adenocarcinoma (HCC)    Goals of care, counseling/discussion    DANGELO (acute kidney injury) ([facility] [address] )    Lactic acidosis    Leukocytosis       Summary of clinical course: This is a 73-year-old male with past medical history significant for adenocarcinoma of unknown origin with pancreatic Mets  Patient initially presented to the hospital on January 31st with complaint of jaundice and abdominal pain and unintentional weight loss  2011 there was a rapid response called due to altered mental status and bleeding that became uncontrollable  Outpatient CT scan had showed 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 origin involving upper abdominal organs, omentum, peritoneum and long segment of sigmoid colon  Rapid was called for bleeding from his IR biliary drainage catheter site  This morning palliative had a conversation with patient and his family and decision was made to make patient comfort care  Patient has a inpatient hospice consult, however it is unlikely the patient is candidate for inpatient hospice    Family reported that they are unable to provide home hospice, patient will likely be a candidate for SNF placement at this time  Recent or scheduled procedures: none     Outstanding/pending diagnostics: None     Hospital discharge planning:  Home hospice vs  SNF     This case was discussed with Dr Lisbet Torre

## 2018-02-12 NOTE — PROGRESS NOTES
Long discussion with palliative care MD, critical care residents, patient and family  Decision made to make patient comfort care

## 2018-02-12 NOTE — PROGRESS NOTES
I personally spoke with the patient's family namely his son who will be the POA  After being presented with patient's prognosis and discussing his recent rapid response and poor condition, the decision was made by the POA and family that patient will be made a level 3 DNR DNI  Jammie Marquez MD

## 2018-02-12 NOTE — PROGRESS NOTES
02/12/18 [address] but not [employer]   Current [employer] Involvement Patient not active with [facility]   Spiritual Beliefs/Perceptions   Concept of God Accepting   Support Systems Spouse/significant other;Children;Family members   Stress Factors   Family Stress Factors Health changes; Other (Comment)  (Rapid health changes & insurance decisions)   Coping Responses   Family Coping Open/discussion; Sadness   Plan of Care   Comments Visited with pt and girlfriend; girlfriend expressed feeling overwhelmed with upcoming decisions to be made; explored spiritual and emotional resources; explored community support and discussed grief and coping; provided encouragement and support; provided prayer   Assessment Completed by: Unit visit

## 2018-02-13 PROBLEM — C79.9 METASTATIC CANCER (HCC): Status: ACTIVE | Noted: 2018-02-13

## 2018-02-13 PROBLEM — D50.0 BLOOD LOSS ANEMIA: Status: ACTIVE | Noted: 2018-02-13

## 2018-02-13 PROBLEM — D68.9 COAGULOPATHY (HCC): Status: ACTIVE | Noted: 2018-02-13

## 2018-02-13 PROCEDURE — 99232 SBSQ HOSP IP/OBS MODERATE 35: CPT | Performed by: GENERAL PRACTICE

## 2018-02-13 PROCEDURE — 99233 SBSQ HOSP IP/OBS HIGH 50: CPT | Performed by: NURSE PRACTITIONER

## 2018-02-13 RX ORDER — HALOPERIDOL 2 MG/ML
1 SOLUTION ORAL 3 TIMES DAILY
Status: DISCONTINUED | OUTPATIENT
Start: 2018-02-13 | End: 2018-02-14 | Stop reason: HOSPADM

## 2018-02-13 RX ORDER — HALOPERIDOL 5 MG/ML
1 INJECTION INTRAMUSCULAR 3 TIMES DAILY
Status: DISCONTINUED | OUTPATIENT
Start: 2018-02-13 | End: 2018-02-14 | Stop reason: HOSPADM

## 2018-02-13 RX ORDER — MORPHINE SULFATE 2 MG/ML
2 INJECTION, SOLUTION INTRAMUSCULAR; INTRAVENOUS EVERY 4 HOURS PRN
Status: DISCONTINUED | OUTPATIENT
Start: 2018-02-13 | End: 2018-02-14 | Stop reason: HOSPADM

## 2018-02-13 RX ORDER — OXYCODONE HCL 5 MG/5 ML
10 SOLUTION, ORAL ORAL EVERY 6 HOURS
Status: DISCONTINUED | OUTPATIENT
Start: 2018-02-13 | End: 2018-02-14 | Stop reason: HOSPADM

## 2018-02-13 RX ORDER — OXYCODONE HCL 5 MG/5 ML
10 SOLUTION, ORAL ORAL EVERY 4 HOURS PRN
Status: DISCONTINUED | OUTPATIENT
Start: 2018-02-13 | End: 2018-02-14 | Stop reason: HOSPADM

## 2018-02-13 RX ORDER — LORAZEPAM 2 MG/ML
0.5 INJECTION INTRAMUSCULAR EVERY 8 HOURS PRN
Status: DISCONTINUED | OUTPATIENT
Start: 2018-02-13 | End: 2018-02-14 | Stop reason: HOSPADM

## 2018-02-13 RX ORDER — HALOPERIDOL 5 MG/ML
1 INJECTION INTRAMUSCULAR
Status: DISCONTINUED | OUTPATIENT
Start: 2018-02-13 | End: 2018-02-14 | Stop reason: HOSPADM

## 2018-02-13 RX ORDER — HALOPERIDOL 5 MG/ML
1 INJECTION INTRAMUSCULAR
Status: DISCONTINUED | OUTPATIENT
Start: 2018-02-13 | End: 2018-02-13

## 2018-02-13 RX ADMIN — OXYCODONE HYDROCHLORIDE 10 MG: 10 TABLET ORAL at 05:38

## 2018-02-13 RX ADMIN — OXYCODONE HYDROCHLORIDE 10 MG: 5 SOLUTION ORAL at 18:44

## 2018-02-13 RX ADMIN — HALOPERIDOL LACTATE 1 MG: 5 INJECTION, SOLUTION INTRAMUSCULAR at 21:35

## 2018-02-13 RX ADMIN — OXYCODONE HYDROCHLORIDE 10 MG: 5 SOLUTION ORAL at 13:35

## 2018-02-13 RX ADMIN — HYDROMORPHONE HYDROCHLORIDE 0.5 MG: 1 INJECTION, SOLUTION INTRAMUSCULAR; INTRAVENOUS; SUBCUTANEOUS at 13:35

## 2018-02-13 RX ADMIN — HALOPERIDOL 1 MG: 2 SOLUTION ORAL at 16:45

## 2018-02-13 NOTE — SOCIAL WORK
CM met with pt's son to discuss d/c plan  CM discussed rehab vs hospice plan with pt's son and family  Pt's family anticipate pt will be unable to participate in therapy at SNF  Anticipate pt will try to go to SNF under comfort care days with transition to hospice  Referrals made to 1  [facility]  [facility]   [facility] per pt's son's request

## 2018-02-13 NOTE — SOCIAL WORK
CM met with pt's son and family again to discuss d/c plan  Pt's family would prefer d/c to [facility] if eligible  Family also requesting additional referrals to [address], and [facility]  Referrals made via [facility]

## 2018-02-13 NOTE — PROGRESS NOTES
Progress note - Palliative and Supportive Care   Natalya Santiago 71 y o  male [id number]    Assessment:     Patient Active Problem List   Diagnosis    Chronic low back pain    Chronic obstructive pulmonary disease (HCC)    Degenerative joint disease (DJD) of hip    Fatigue    Generalized abdominal pain    GERD (gastroesophageal reflux disease)    Jaundice    Left foot pain    Plantar fasciitis, left    Poor dentition    Right hip pain    Abnormal CT of the abdomen    Abnormal LFTs    Weight loss, non-intentional    UTI (urinary tract infection)    Liver mass    Other hydronephrosis    Metastatic adenocarcinoma (HCC)    Goals of care, counseling/discussion    DANGELO [facility]acute kidney injury) ([facility] [address] )    Lactic acidosis    Leukocytosis       Plan:  1  Symptom management - Cancer related pain, Agitation    - Will trial scheduled oral liquid medications; if pt unable to tolerate PO will convert to all IV    - hydromorphone 0 5 mg IV x 1 now and Q 2 PRN   - Roxicodone 10 mg PO/SL Q 6 scheduled and Q 4 PRN   - haldol liquid concentrate 1 mg PO/SL TID    - haldol 1 mg IV Q 1 PRN agitation, N/V    - Lorazepam 2 mg PO Q 8 PRN     2  Goals - CMO   - Family at bedside expressing desire for pt to be comfortable/not suffer   - Hospice consult placed as family has many questions regarding hospice services  - If pt unable to tolerate oral regimen he would qualify for IPU     Code Status: CMO - Level 4   Decisional apparatus:  Patient is not competent on my exam today  If competence is lost, patient's substitute decision maker would default to son by [place]  Advance Directive / Living Will / POLST:  none      Interval history:       Was called to bedside by pt's RN as family at bedside with many questions regarding pt's pain medications and hospice services  Family is distressed as pt appears uncomfortable and agitated   They express concerns that he is not eating or swallowing easily and are afraid he will choke or his pills  They do not want him to suffer and state his comfort is of the utmost importance  They have expressed an interest in receiving hospice service in a SNF closer to home and are agreeable to trial of scheduled oral liquids that can be given PO or SL to ease symptoms, If he is unable to tolerate PO his regimen can be adjusted to all IV and at that time he would become IPU appropriate  I will have our hospice liaison meet with family at bedside to address their questions regarding hospice services  Psychosocial support provided  MEDICATIONS / ALLERGIES:    all current active meds have been reviewed    Allergies   Allergen Reactions    Sulfa Antibiotics Rash       OBJECTIVE:    Physical Exam  Physical Exam   Constitutional: He appears distressed  Appears ill and uncomfortable and slightly agitated    Eyes: Scleral icterus is present  Pulmonary/Chest: Effort normal  No respiratory distress  Abdominal: He exhibits distension  There is tenderness  Musculoskeletal:   Temporal wasting   Neurological: He is alert  He is disoriented  Skin:   jaundiced   Psychiatric: He is agitated  Thought content is delusional  Cognition and memory are impaired  Lab Results: I have personally reviewed pertinent labs  Imaging Studies: EKG, Pathology, and Other Studies: reviewed    Counseling / Coordination of Care  Total floor / unit time spent today 45 minutes  Greater than 50% of total time was spent with the patient and / or family counseling and / or coordination of care  A description of the counseling / coordination of care: addressing symptoms and discussing comfort measures and hospice services with family at bedside   Coordination of care with bedside RN, LYUDMILA and hospice liaison

## 2018-02-13 NOTE — HOSPICE NOTE
Met with family and discussed hospice support in SNF  Family having referrals to [facility] and [facility] SNFs, questioning inpatient hospice  Pt very ill, but currently comfortable on PO Roxycodone   Suresh Edwards Will reassess tomorrow  LYUDMILA mustafa

## 2018-02-14 ENCOUNTER — TELEPHONE (OUTPATIENT)
Dept: FAMILY MEDICINE CLINIC | Facility: CLINIC | Age: 73
End: 2018-02-14

## 2018-02-14 VITALS
RESPIRATION RATE: 16 BRPM | WEIGHT: 125.22 LBS | HEIGHT: 68 IN | TEMPERATURE: 97.7 F | BODY MASS INDEX: 18.98 KG/M2 | OXYGEN SATURATION: 95 % | HEART RATE: 88 BPM | DIASTOLIC BLOOD PRESSURE: 73 MMHG | SYSTOLIC BLOOD PRESSURE: 121 MMHG

## 2018-02-14 LAB
BACTERIA BLD CULT: ABNORMAL
GRAM STN SPEC: ABNORMAL

## 2018-02-14 PROCEDURE — 99232 SBSQ HOSP IP/OBS MODERATE 35: CPT | Performed by: FAMILY MEDICINE

## 2018-02-14 PROCEDURE — 99239 HOSP IP/OBS DSCHRG MGMT >30: CPT | Performed by: GENERAL PRACTICE

## 2018-02-14 RX ORDER — MORPHINE SULFATE 2 MG/ML
2 INJECTION, SOLUTION INTRAMUSCULAR; INTRAVENOUS EVERY 4 HOURS PRN
Refills: 0
Start: 2018-02-14 | End: 2018-02-24

## 2018-02-14 RX ORDER — HALOPERIDOL 5 MG/ML
1 INJECTION INTRAMUSCULAR
Qty: 1 ML | Refills: 0
Start: 2018-02-14

## 2018-02-14 RX ORDER — OXYCODONE HCL 5 MG/5 ML
10 SOLUTION, ORAL ORAL EVERY 4 HOURS PRN
Refills: 0
Start: 2018-02-14 | End: 2018-02-24

## 2018-02-14 RX ORDER — DOCUSATE SODIUM 100 MG/1
100 CAPSULE, LIQUID FILLED ORAL 2 TIMES DAILY
Qty: 10 CAPSULE | Refills: 0
Start: 2018-02-14

## 2018-02-14 RX ORDER — LORAZEPAM 2 MG/ML
0.5 INJECTION INTRAMUSCULAR EVERY 8 HOURS PRN
Qty: 1 ML | Refills: 0
Start: 2018-02-14 | End: 2018-02-24

## 2018-02-14 RX ORDER — HALOPERIDOL 2 MG/ML
1 SOLUTION ORAL 3 TIMES DAILY
Qty: 120 ML | Refills: 0
Start: 2018-02-14

## 2018-02-14 RX ORDER — SENNOSIDES 8.6 MG
1 TABLET ORAL
Qty: 120 EACH | Refills: 0
Start: 2018-02-14

## 2018-02-14 RX ORDER — HALOPERIDOL 5 MG/ML
1 INJECTION INTRAMUSCULAR 3 TIMES DAILY
Qty: 1 ML | Refills: 0
Start: 2018-02-14

## 2018-02-14 RX ORDER — OXYCODONE HCL 5 MG/5 ML
10 SOLUTION, ORAL ORAL EVERY 6 HOURS
Refills: 0
Start: 2018-02-14 | End: 2018-02-24

## 2018-02-14 RX ADMIN — HALOPERIDOL 1 MG: 2 SOLUTION ORAL at 08:48

## 2018-02-14 RX ADMIN — MORPHINE SULFATE 2 MG: 2 INJECTION, SOLUTION INTRAMUSCULAR; INTRAVENOUS at 09:42

## 2018-02-14 RX ADMIN — HYDROMORPHONE HYDROCHLORIDE 0.5 MG: 1 INJECTION, SOLUTION INTRAMUSCULAR; INTRAVENOUS; SUBCUTANEOUS at 08:49

## 2018-02-14 RX ADMIN — OXYCODONE HYDROCHLORIDE 10 MG: 5 SOLUTION ORAL at 06:03

## 2018-02-14 RX ADMIN — HALOPERIDOL LACTATE 1 MG: 5 INJECTION, SOLUTION INTRAMUSCULAR at 08:56

## 2018-02-14 RX ADMIN — LORAZEPAM 0.5 MG: 2 INJECTION INTRAMUSCULAR; INTRAVENOUS at 10:05

## 2018-02-14 NOTE — PROGRESS NOTES
Progress Note - Marily Lei 1945, 71 y o  male MRN: [id number]    Unit/Bed#: [facility] 629-01 Encounter: [id number]    Primary Care Provider: Alexi Herrera DO   Date and time admitted to hospital: 2018  7:35 PM        Blood loss anemia   Assessment & Plan    Pt received 3U PRBC  No further workup as pt comfort care        Metastatic cancer [facility])   Assessment & Plan    Pt now comfort care        Coagulopathy ([facility] [address] )   Assessment & Plan    PT given FFP due to ABLA  Pt now comfort care        Leukocytosis   Assessment & Plan      · Likely secondary to stress, inflammation   · Afebrile - abx not currently indicated   · output from perc biliary drains - non purulent   · No more blood draws        Chronic obstructive pulmonary disease (HCC)   Assessment & Plan    · Continue albuterol for comfort  · No acute exacerbation           VTE Pharmacologic Prophylaxis:   Pharmacologic: Pharmacologic VTE Prophylaxis contraindicated due to comofrot care  Mechanical VTE Prophylaxis in Place: No    Patient Centered Rounds: I have performed bedside rounds with nursing staff today  Discussions with Specialists or Other Care Team Provider: no    Education and Discussions with Family / Patient: palliative d/w family    Time Spent for Care: 30 minutes  More than 50% of total time spent on counseling and coordination of care as described above  Current Length of Stay: 13 day(s)    Current Patient Status: Inpatient   Certification Statement: The patient will continue to require additional inpatient hospital stay due to need for placement    Discharge Plan: d/c to hospice IPU or STR    Code Status: Level 4 - Comfort Care      Subjective:   Pt seen and examined  He is comfortable  Objective:     Vitals:   Temp (24hrs), Av 3 °F (36 8 °C), Min:98 °F (36 7 °C), Max:98 6 °F (37 °C)    HR:  [80-84] 84  Resp:  [14-16] 16  BP: (105-138)/(73-74) 138/73  SpO2:  [96 %-99 %] 99 %  Body mass index is 19 04 kg/m²       Input and Output Summary (last 24 hours): Intake/Output Summary (Last 24 hours) at 02/13/18 2117  Last data filed at 02/13/18 1900   Gross per 24 hour   Intake              120 ml   Output             2000 ml   Net            -1880 ml       Physical Exam:     Physical Exam   Constitutional: No distress  HENT:   Head: Normocephalic and atraumatic  Eyes: Conjunctivae and EOM are normal    Neck: Normal range of motion  Neck supple  Cardiovascular: Normal rate and regular rhythm  Pulmonary/Chest: Effort normal and breath sounds normal  He has no wheezes  He has no rales  Abdominal: Soft  Bowel sounds are normal  He exhibits no distension  There is no tenderness  Musculoskeletal: Normal range of motion  He exhibits no edema  Neurological: He is alert  Skin: Skin is warm and dry  He is not diaphoretic  Additional Data:     Labs:      Results from last 7 days  Lab Units 02/12/18  0453   WBC Thousand/uL 13 53*   HEMOGLOBIN g/dL 8 9*   HEMATOCRIT % 25 1*   PLATELETS Thousands/uL 168   NEUTROS PCT % 85*   LYMPHS PCT % 8*   MONOS PCT % 7   EOS PCT % 0       Results from last 7 days  Lab Units 02/12/18  0453   SODIUM mmol/L 143   POTASSIUM mmol/L 3 3*   CHLORIDE mmol/L 112*   CO2 mmol/L 22   BUN mg/dL 38*   CREATININE mg/dL 1 71*   CALCIUM mg/dL 7 5*   TOTAL PROTEIN g/dL 5 5*   BILIRUBIN TOTAL mg/dL 14 66*   ALK PHOS U/L 194*   ALT U/L 55   AST U/L 64*   GLUCOSE RANDOM mg/dL 86       Results from last 7 days  Lab Units 02/12/18  0453   INR  2 21*       * I Have Reviewed All Lab Data Listed Above  * Additional Pertinent Lab Tests Reviewed: [facility]        Recent Cultures (last 7 days):       Results from last 7 days  Lab Units 02/12/18  0022 02/12/18  0008   BLOOD CULTURE  No Growth at 24 hrs     Klebsiella-Enterobacter  group*   GRAM STAIN RESULT   --  Gram negative rods       Last 24 Hours Medication List:     Current Facility-Administered Medications:  docusate sodium 100 mg Oral BID Karey Hdez DO   haloperidol 1 mg Oral TID Lavona Elberta, CRNP   haloperidol lactate 1 mg Intravenous Q1H PRN Lavona Elberta, CRNP   haloperidol lactate 1 mg Intravenous TID Cori Sale, PA-C   HYDROmorphone 0 5 mg Intravenous Q2H PRN Lavona Elberta, CRNP   LORazepam 0 5 mg Intravenous Q8H PRN Cori Sale, PA-C   magnesium hydroxide 30 mL Oral Daily PRN Karey Hdez DO   morphine injection 2 mg Intravenous Q4H PRN Cori Sale, PA-C   oxyCODONE 10 mg Oral Q6H Lavona Elberta, CRNP   oxyCODONE 10 mg Oral Q4H PRN Lavona Elberta, CRNP   senna 1 tablet Oral HS Karey Hdez DO        Today, Patient Was Seen By: Daniel Moreland DO    ** Please Note: Dictation voice to text software may have been used in the creation of this document   **

## 2018-02-14 NOTE — ASSESSMENT & PLAN NOTE
· Lt sided tortuous ureter but no clear obstruction  · Suspected 2/2 infiltrative tumour causing extrinsic compression  · Status post left-sided percutaneous nephrostomy placed on 02/06/2018

## 2018-02-14 NOTE — DISCHARGE INSTRUCTIONS
Percutaneous Transhepatic Biliary Drainage   WHAT YOU NEED TO KNOW:   Percutaneous transhepatic biliary drainage (PTBD) is done to open a blocked bile duct  DISCHARGE INSTRUCTIONS:   Medicines:   · Medicines Take your medicine as directed  · Diet Resume your everyday diet  Small sips of flat soda will help with mild nausea  PTBD TUBE CARE INSTRUCTIONS    1  The properly functioning catheter should be forward flushed once (1x) daily with 10ml of normal saline using clean technique  You will be given a prescription for flushes  To flush the tube, clean both connections with alcohol swab  Twist off the drainage bag  Tubing  Twist the saline syringe into the drainage tube and flush  Remove the syringe and twist the drainage bag tubing tubing back on     2  The drainage bag may be emptied as necessary  Bile is a dark greenish liquid  Keep a record of the amount of bile you drain from your bag   3  A fresh dressing should be applied daily over the tube insertion site  4  As the tube is secured to the skin with only a suture,try not to pull on your tube  Tub baths are not permitted  Showers are permitted if the patient's skin entry site is prevented from getting wet  Similarly, washcloth "baths" are acceptable  Contact Interventional Radiology at [phone] [facility] PATIENTS: Contact Interventional Radiology at [phone]) [facility] PATIENTS: Contact Interventional Radiology at [phone]) if:    · Your skin or the whites of your eyes look more yellow than usual    · You have a fever  · You have nausea, or you are vomiting  · Your bowel movements have changed color and are very light or dark  · Your skin around your tube itches or is red, swollen, or painful  · You have questions or concerns about your condition or care  ·  You have a drainage bag, and there is little or no fluid draining into the bag  · You are coughing or vomiting blood     · You are dizzy, or you feel too weak to stand up  · You have new trouble breathing  · Your abdomen is hard, swollen, or painful  · Your tube falls out  Your stools look red, or you see blood in the toilet  You have  leakage or large amounts of liquid around the catheter  You have a fever of 101 degrees lasting several hours           The following pharmacies carry the flush syringes  [facility]                     [facility]                         [facility] [place] [facility]       [address] [facility]                     [address] [facility]  Phone [phone]            Phone [phone]                                                                                                    [facility] [phone] [address] [facility]                      [address] [facility]  Phone [phone]            Phone [phone]    [facility]                    [facility]                    [phone]  [address] [facility]   Phone [phone]           UMA GOMES                                                [phone]                                                      Nephrostomy Tube Care     WHAT YOU NEED TO KNOW:   A nephrostomy tube is a catheter (thin plastic tube) that is inserted through your skin and into your kidney  The nephrostomy tube drains urine from your kidney into a collecting bag outside your body  You may need a nephrostomy tube when something is blocking the normal flow of urine  A nephrostomy tube may be used for a short or a long period of time  The nephrostomy tube comes out of your back, so you will need someone to help care for your nephrostomy tube      DISCHARGE INSTRUCTIONS:      How to clean the skin around the nephrostomy tube and change the bandage:  Since the nephrostomy tube comes out of your back, you will not be able to care for it by yourself  Ask someone to follow the general directions below to check and care for your nephrostomy tube  Gather the items you will need  Disposable (single use) under-pad, and a clean washcloth  ¨ Plain soap, warm water, and new medical gloves  ¨ Sterile gauze bandages  ¨ Clear adhesive dressing or medical tape  ¨ Skin barrier  ¨ Protective skin film  ¨ Trash bag  · Remove the old bandage, and check the tube entry site  ¨ Have the patient lie on his side with the nephrostomy tube entry site facing up  Place the under-pad where it will catch drainage as you are working with the nephrostomy tube  ¨ Wash your hands with soap and water  Put on new medical gloves  ¨ Gently remove the old bandage, without pulling on the tube  Do this by holding the skin beside the tube with one hand  With the other hand, gently remove sticky tape and the skin barrier by pulling in the same direction as hair growth  Do not touch the side of the bandage that is placed over or around the tube  Throw the bandage and skin barrier away in a trash bag  ¨ Look for signs of infection, such as skin redness and swelling  Report any skin changes to healthcare providers  ¨ Clean the tube entry site  ¨ Hold the tube in place to keep it from being pulled out while you are cleaning around it  ¨ You will need to clean the area twice  For the first cleaning, wet a new gauze bandage with soap and water  Begin at the entry site of the tube  Wipe the skin in circles, moving away from the entry site  Remove blood and any other material with the gauze  Do this as often as needed  Use a new gauze bandage each time you clean the area, moving away from the entry site  ¨ For the second cleaning, wet a new gauze bandage with water  Begin at the entry site of the tube  Wipe the skin in circles, moving away from the entry site   Use a new gauze bandage each time you clean the area, moving away from the entry site  ¨ Gently pat the skin with a clean washcloth to dry it  · Apply the skin barrier and bandages  ¨ Roll up a bandage to make it thick, and place it under  the place where the tube enters the skin  Place it to support the tube, and stop it from kinking or bending  Tape the bandage in place, and apply more bandages if directed by a healthcare provider  ¨ Bring the tubing forward to the front and tape it to the skin  Do not stretch the tube tight, because this may pull the nephrostomy tube out  How often to change the bandage  Change the bandage around the tube, every other day  If your bandages  get dirty or wet, change them right away, and as often as needed  If your nephrostomy tube is to be used for a long period of time, the tube needs to be changed every 2 to 3 months  Healthcare providers will tell you when you need to make an appointment to have your tube changed  How to care for the urine drainage bag:   · Ask if you need to measure and write down how much urine is in the bag before you empty it  Drain urine out of the drainage bag when it is ½ to ? full  Open the spout at the bottom of the bag to empty the urine into the toilet  · You may need to detach the drainage bag from the nephrostomy tube to change it    If so, attach a new drainage bag tightly to the nephrostomy tube  ·   How to prevent problems with your nephrostomy tube:   · Change bandages, directed  This helps to prevent infection  Throw away or clean your drainage bag as directed by your healthcare provider  · Wipe the connecting ends of the drainage bag with alcohol before you reconnect the bag to the tube  This helps prevent infection  Keep the tube taped to your skin and connected to a drainage bag placed below the level of your kidneys  This helps prevent urine from backing up into your kidneys   You may wear a small drainage bag strapped to your leg to let you move around more easily  · Check the catheter to be sure it is in place after you change your clothes or do other activities  Do not wear tight clothing over the tube  Place the tubing over your thigh rather than under it when you are sitting down  Be sure that nothing is pulling on the nephrostomy tube when you move around  · Change positions if you see little or no urine in your drainage bag  Check to see if the urine tube is twisted or bent  Be sure that you are not sitting or lying on the tube  If there are no kinks and there is little or no urine in the drainage bag, tell your healthcare provider  · Flush out the tube as directed  Some tubes get flushed one time a day with 10 mls of NSS You will be given a prescription for the flushes  To flush the nephrostomy tube, clean both connections with alcohol swap  Twist off the drainage bag tube and twist the saline syringe into the nephrostomy tube and flush briskly  Remove the syringe and twist the drainage bag tube back into the nephrostomy tube  · Keep the site covered while you shower  Tape a piece of clear adhesive plastic over the dressing to keep it dry while you shower  Do not take tub baths  Contact Interventional Radiology at [phone] [place] PATIENTS: Contact Interventional Radiology at [phone]) Jean Mittal PATIENTS: Contact Interventional Radiology at [phone]) if:  · The skin around the nephrostomy tube is red, swollen, itches, or has a rash  · You have a fever greater than 101 or chills  · You have lower back or hip pain  · There are changes in how your urine looks or smells  · You have little or no urine draining from the nephrostomy tube  · You have nausea and are vomiting  · The black odilia on your tube has moved, or the tube is longer than when it was put in    · You have questions or concerns about your condition or care  · The nephrostomy tube comes out completely     · There is blood, pus, or a bad smell coming from the place where the tube enters your skin  · Urine is leaking around the tube  The following pharmacies carry the flush syringes  [facility]                     [facility]       [address] [facility]                    [address] [facility]  Phone [phone]            Phone [phone]  [address] [facility] [address]                      [address]                                 [phone]  Phone [phone]            Phone [phone]    [facility]                                                                         [facility] [phone]  [address]  [facility]   Phone [phone]    Percutaneous Liver Biopsy   WHAT YOU NEED TO KNOW:   A PLB is a procedure to remove a sample of tissue from your liver  The sample can be sent to a lab and tested for liver disease, cancer, or infection  After the procedure you may have pain and bruising at the biopsy site  You may also have pain in your right shoulder  These symptoms should get better in 48 to 72 hours  DISCHARGE INSTRUCTIONS:     [address] [facility] patients,  Contact Interventional Radiology at [phone] PATIENTS: Contact Interventional Radiology at [phone]   Preet [facility]nott PATIENTS: Contact Interventional Radiology at [phone] if:    · Fever greater than 101 or chills  · You have severe pain in your abdomen  · Your abdomen is larger than usual and feels hard  · Your neck is more swollen and you have trouble swallowing  · You feel weak or dizzy      · Your heart is beating faster than usual    · Your pain does not get better after you take pain medicine  · Your wound is red, swollen, or draining pus  · You have nausea or are vomiting  · Your skin is itchy, swollen, or you have a rash  · You have questions or concerns about your condition or care  Medicines:   · Acetaminophen decreases pain and fever  It is available without a doctor's order  Acetaminophen can cause liver damage if not taken correctly  · Take your home medicine as directed  · Resume your normal diet  Small sips of flat soda will help with mild nausea  Self-care:   · Rest as directed  Do not play sports, exercise, or lift anything heavier than 5 pounds for up to 1 week  · Apply firm, steady pressure if bleeding occurs  A small amount of bleeding from your wound is possible  Apply pressure with a clean gauze or towel for 5 to 10 minutes  Call 911 if bleeding becomes heavy or does not stop  · Ask your healthcare provider when to take your blood thinner or antiplatelet medicine  You may need to wait 24 to 72 hours to take your medicine  This will prevent bleeding  Follow up with your healthcare provider as directed: Write down your questions so you remember to ask them during your visits

## 2018-02-14 NOTE — PROGRESS NOTES
Progress note - Palliative and Supportive Care   Hakeem Bonilla 71 y o  male [id number]    Assessment:   - metastatic adenocarcinoma, likely pancreatic or biliary primary   - COPD   - dyspnea with increased work of breathing   - uncontrolled pain    Plan:  1  Symptom management - transfer to hospice House for symptom management and end of life care   -     2  Goals - comforted and life, level 4 code status   -    Code Status: Level 4 - Comfort Care    Reason for visit:  Follow up control of dyspnea and pain    Interval history:       Patient with history above  Overnight his condition worsened and has required 2 IV doses of Dilaudid as well as 1 dose of morphine without significant improvement in symptoms  Total oral morphine equivalent to the left 24 hours equals 80 mg  Discussed with Clarice (hospice liaison)  Family members present in the room  MEDICATIONS / ALLERGIES:    all current active meds have been reviewed and current meds:   Current Facility-Administered Medications   Medication Dose Route Frequency    docusate sodium (COLACE) capsule 100 mg  100 mg Oral BID    haloperidol (HALDOL) oral concentrated solution 1 mg  1 mg Oral TID    haloperidol lactate (HALDOL) injection 1 mg  1 mg Intravenous Q1H PRN    haloperidol lactate (HALDOL) injection 1 mg  1 mg Intravenous TID    HYDROmorphone (DILAUDID) injection 0 5 mg  0 5 mg Intravenous Q2H PRN    LORazepam (ATIVAN) 2 mg/mL injection 0 5 mg  0 5 mg Intravenous Q8H PRN    magnesium hydroxide (MILK OF MAGNESIA) 400 mg/5 mL oral suspension 30 mL  30 mL Oral Daily PRN    morphine injection 2 mg  2 mg Intravenous Q4H PRN    oxyCODONE (ROXICODONE) oral solution 10 mg  10 mg Oral Q6H    oxyCODONE (ROXICODONE) oral solution 10 mg  10 mg Oral Q4H PRN    senna (SENOKOT) tablet 8 6 mg  1 tablet Oral HS       Allergies   Allergen Reactions    Sulfa Antibiotics Rash       OBJECTIVE:    Physical Exam    Physical Exam   Constitutional: He appears listless   He appears cachectic  HENT:   Head: Normocephalic and atraumatic  Right Ear: External ear normal  Decreased hearing is noted  Left Ear: External ear normal  Decreased hearing is noted  Nose: Nose normal    Mouth/Throat: Mucous membranes are pale and dry  Nasal O2   Eyes: EOM and lids are normal  Right eye exhibits no discharge  Left eye exhibits no discharge  Scleral icterus is present  Neck: No tracheal deviation present  Cardiovascular: Exam reveals decreased pulses  Pulmonary/Chest: Accessory muscle usage present  No stridor  Increased work of breathing  No audible rhonchi while standing at bedside   Neurological: He appears listless  Lab Results: I have personally reviewed pertinent labs  Thank you kindly for allowing us to help provide care for your patient  Yadi Fernandez   [place], [address]  [phone]

## 2018-02-14 NOTE — ASSESSMENT & PLAN NOTE
· Not POA   · Has R PCN in place functioning well, no hydro on CT scan   · Suspect prerenal as patient has had poor appetite over last few days, dye load yesterday  · Given gentle IVF hydration   · Pt d/c on hospice

## 2018-02-14 NOTE — HOSPICE NOTE
Met with family, pt has been requiring IV haldol and dilaudid, approved for inpatient hospice  Consents signed and faxed to [facility]  Transport set for 230pm  Nurse to call report to [facility] prior to transport at [phone]

## 2018-02-14 NOTE — ASSESSMENT & PLAN NOTE
· Painless jaundice, mainly obstructive + some infiltrative component 2/2 extrinsic tumor/masses  · Bilirubin stable around 13-15  · Obstruction secondary to mass s/p biopsy 2/2/18  · S/p 2 percutaneous biliary drains placed on 2/6/18   · D/w GI in detail 2/9, unfortunately not much else they can do from GI standpoint, will need OP onc follow up  · S/p IR tube check 2/9/18 - patent  · CT abdomen/pelvis - tubes patent 2/10  · Having bleeding and yellow drainage at site of perc biliary drain 2/11 - pt transferred to ICU, and eventually transitioned to hospice  · Pain control with oxycodone, dose was decreased due to confusion, appreciate palliative med consult   · Pt d/c 2/14 to inpt hospice

## 2018-02-14 NOTE — ASSESSMENT & PLAN NOTE
· Likely secondary to stress, inflammation   · Afebrile - abx not currently indicated   · output from perc biliary drains - non purulent   · No more blood draws

## 2018-02-14 NOTE — SOCIAL WORK
CM informed by Clarice-liaison [facility], pt accepted at [facility]  Transport arranged with in-network provider JUANPABLO HORAN at 2:30 to [facility] auth # [id number] obtained from [place] [address] for transport  Pt's son, bedside RN, and Clarice-liaison [facility] notified of transport time  Chart copy requested  Transfer to facility and CMN forms completed

## 2018-02-14 NOTE — DISCHARGE SUMMARY
Discharge- Marily Lei 1945, 71 y o  male MRN: [id number]    Unit/Bed#: [facility] 629-01 Encounter: [id number]    Primary Care Provider: Alexi Herrera DO   Date and time admitted to hospital: 1/31/2018  7:35 PM        Blood loss anemia   Assessment & Plan    Pt received 3U PRBC  No further workup as pt comfort care        Metastatic cancer [facility])   Assessment & Plan    Pt now comfort care  D/c to hospice  Adneocarcinoma - pancreatic or biliary        Coagulopathy ([facility] )   Assessment & Plan    PT given FFP due to ABLA  Pt now comfort care        Leukocytosis   Assessment & Plan      · Likely secondary to stress, inflammation   · Afebrile - abx not currently indicated   · output from perc biliary drains - non purulent   · No more blood draws        DANGELO (acute kidney injury) ([facility] )   Assessment & Plan    · Not POA   · Has R PCN in place functioning well, no hydro on CT scan   · Suspect prerenal as patient has had poor appetite over last few days, dye load yesterday  · Given gentle IVF hydration   · Pt d/c on hospice        Other hydronephrosis   Assessment & Plan    · Lt sided tortuous ureter but no clear obstruction  · Suspected 2/2 infiltrative tumour causing extrinsic compression  · Status post left-sided percutaneous nephrostomy placed on 02/06/2018          Fatigue   Assessment & Plan    Secondary to underlying malignancy        Chronic obstructive pulmonary disease (HCC)   Assessment & Plan    · Continue albuterol for comfort  · No acute exacerbation         * Jaundice   Assessment & Plan    · Painless jaundice, mainly obstructive + some infiltrative component 2/2 extrinsic tumor/masses  · Bilirubin stable around 13-15  · Obstruction secondary to mass s/p biopsy 2/2/18  · S/p 2 percutaneous biliary drains placed on 2/6/18   · D/w GI in detail 2/9, unfortunately not much else they can do from GI standpoint, will need OP onc follow up  · S/p IR tube check 2/9/18 - patent  · CT abdomen/pelvis - tubes patent 2/10  · Having bleeding and yellow drainage at site of perc biliary drain 2/11 - pt transferred to ICU, and eventually transitioned to hospice  · Pain control with oxycodone, dose was decreased due to confusion, appreciate palliative med consult   · Pt d/c 2/14 to inpt hospice              Consultations During Hospital Stay:  · Dr Dallas Cadena - onc  · Dr Nishant Willimason - GI  · Dr Tereso Harris - urology  · Dr Kendra Bearden - palliative  · Dr Baljit Mendoza - gen surg    Procedures Performed:     · See above    Significant Findings / Test Results:     · Liver bx showed metastatic adenocarcinoma of biliary or pancreatic source    Incidental Findings:   · none     Test Results Pending at Discharge (will require follow up):   · none     Outpatient Tests Requested:  · none    Complications:  Pt transferred to ICU for ABLA as above    Reason for Admission: abnormal CTAP suspicious for metastatic ca    Hospital Course:     Madhav Mittal is a 71 y o  male patient who originally presented to the hospital on 1/31/2018 due to an abnormal CTAP suspicious for metastatic cancer  Sent in by Dr Anastasia Severe to ER and he was admitted  Had 14 day hospital stay as above and was eventually made comfort care and d/c to hospice  Please see above list of diagnoses and related plan for additional information  Condition at Discharge: poor     Discharge Day Visit / Exam:     Subjective:  Pt seen and examined  He is asleep  Vitals: Blood Pressure: 121/73 (02/14/18 0730)  Pulse: 88 (02/14/18 0730)  Temperature: 97 7 °F (36 5 °C) (02/14/18 0730)  Temp Source: Oral (02/14/18 0730)  Respirations: 16 (02/14/18 0730)  Height: 5' 8" (172 7 cm) (01/31/18 2234)  Weight - Scale: 56 8 kg (125 lb 3 5 oz) (02/01/18 0530)  SpO2: 95 % (02/14/18 0730)  Exam:   Physical Exam   Constitutional: No distress  HENT:   Head: Normocephalic and atraumatic  Neck: Neck supple  Cardiovascular: Normal rate and regular rhythm      Pulmonary/Chest: Effort normal and breath sounds normal  He has no wheezes  He has no rales  Abdominal: Soft  Bowel sounds are normal  He exhibits no distension  There is no tenderness  Musculoskeletal: He exhibits no edema  Skin: Skin is warm and dry  He is not diaphoretic  Discussion with Family: yes    Discharge instructions/Information to patient and family:   See after visit summary for information provided to patient and family  Provisions for Follow-Up Care:  See after visit summary for information related to follow-up care and any pertinent home health orders  Disposition:     Other: [address]    For Discharges to [facility] SNF:   · Not Applicable to this Patient - Not Applicable to this Patient    Planned Readmission: no     Discharge Statement:  I spent 35 minutes discharging the patient  This time was spent on the day of discharge  I had direct contact with the patient on the day of discharge  Greater than 50% of the total time was spent examining patient, answering all patient questions, arranging and discussing plan of care with patient as well as directly providing post-discharge instructions  Additional time then spent on discharge activities  Discharge Medications:  See after visit summary for reconciled discharge medications provided to patient and family        ** Please Note: This note has been constructed using a voice recognition system **

## 2018-02-15 LAB
BACTERIA BLD CULT: ABNORMAL
GRAM STN SPEC: ABNORMAL

## 2018-02-19 ENCOUNTER — TRANSCRIBE ORDERS (OUTPATIENT)
Dept: FAMILY MEDICINE CLINIC | Facility: CLINIC | Age: 73
End: 2018-02-19

## 2018-02-19 ENCOUNTER — TELEPHONE (OUTPATIENT)
Dept: FAMILY MEDICINE CLINIC | Facility: CLINIC | Age: 73
End: 2018-02-19

## 2018-02-19 NOTE — TELEPHONE ENCOUNTER
JESSICA AT [facility] PHONED TO LET YOU KNOW THAT PRIYANKA PASSED AWAY ON Saturday

## 2018-02-23 NOTE — ED PROVIDER NOTES
History  Chief Complaint   Patient presents with    Jaundice     pt with jaundice starting a few days ago have never had this problem before, pt with weight loss, had CT scan Saturday has high bilirubin      Patient is a 70 yo M who presents to the office for evaluation of an abnormal CT  Patient has had relatively painless jaundice and weight loss for the past few months  He admits to occasional episodes of generalized abdominal pain  His outpatient CT shows extensive intraabdominal tumor mass and his labs were positive for hyperbilirubinemia  Patient was told to come into the ED for further evaluation  Prior to Admission Medications   Prescriptions Last Dose Informant Patient Reported? Taking? Multiple Vitamins-Minerals (MULTI COMPLETE PO) Past Week at Unknown time  Yes Yes   Sig: Take 1 tablet by mouth daily   albuterol (VENTOLIN HFA) 90 mcg/act inhaler Past Week at Unknown time  No Yes   Sig: Inhale 2 puffs every 4 (four) hours as needed for wheezing   naproxen (NAPROSYN) 500 mg tablet Past Week at Unknown time  Yes Yes   Sig: Take 1 tablet by mouth 2 (two) times a day as needed      Facility-Administered Medications: None       Past Medical History:   Diagnosis Date    COPD (chronic obstructive pulmonary disease) (HCC)     DJD (degenerative joint disease)        History reviewed  No pertinent surgical history  History reviewed  No pertinent family history  I have reviewed and agree with the history as documented  Social History   Substance Use Topics    Smoking status: Former Smoker    Smokeless tobacco: Never Used    Alcohol use No        Review of Systems   Constitutional: Positive for fatigue and unexpected weight change  Negative for chills and fever  HENT: Negative for congestion, sore throat and trouble swallowing  Eyes: Negative for pain, discharge and itching  Respiratory: Negative for cough, chest tightness, shortness of breath and wheezing      Cardiovascular: Negative for chest pain, palpitations and leg swelling  Gastrointestinal: Positive for abdominal pain and diarrhea  Negative for blood in stool, nausea and vomiting  Endocrine: Negative for polyuria  Genitourinary: Negative for difficulty urinating, dysuria, frequency and hematuria  Musculoskeletal: Negative for arthralgias and back pain  Skin: Positive for color change (jaundice)  Negative for rash  Neurological: Negative for dizziness, syncope, weakness, light-headedness and headaches  Physical Exam  ED Triage Vitals [01/31/18 1827]   Temperature Pulse Respirations Blood Pressure SpO2   97 9 °F (36 6 °C) 84 18 137/83 99 %      Temp Source Heart Rate Source Patient Position - Orthostatic VS BP Location FiO2 (%)   Tympanic Monitor Sitting Left arm --      Pain Score       No Pain           Orthostatic Vital Signs  Vitals:    02/12/18 1400 02/13/18 0717 02/13/18 1521 02/14/18 0730   BP: 112/58 105/74 138/73 121/73   Pulse: 68 80 84 88   Patient Position - Orthostatic VS:   Lying        Physical Exam   Constitutional: He is oriented to person, place, and time  No distress  HENT:   Head: Normocephalic and atraumatic  Mouth/Throat: No oropharyngeal exudate  Eyes: EOM are normal  Pupils are equal, round, and reactive to light  Scleral icterus     Neck: Normal range of motion  Neck supple  Cardiovascular: Normal rate, regular rhythm and normal heart sounds  Exam reveals no friction rub  No murmur heard  Pulmonary/Chest: Effort normal and breath sounds normal  No respiratory distress  He has no wheezes  Abdominal: Soft  He exhibits no distension  There is no tenderness  There is no guarding  Musculoskeletal: He exhibits no edema or deformity  Lymphadenopathy:     He has no cervical adenopathy  Neurological: He is alert and oriented to person, place, and time  Skin: Skin is warm     Jaundiced         ED Medications  Medications   sodium chloride 0 9 % infusion (0 mL/hr Intravenous Stopped 2/1/18 1145)   sodium chloride 0 9 % infusion (0 mL/hr Intravenous Stopped 2/13/18 0938)   midazolam (VERSED) injection (1 mg Intravenous Given 2/2/18 1004)   fentanyl citrate (PF) 100 MCG/2ML (50 mcg Intravenous Given 2/2/18 1015)   iohexol (OMNIPAQUE) 300 mg/mL injection 50 mL (20 mL Other Given 2/6/18 1306)   iohexol (OMNIPAQUE) 300 mg/mL injection 50 mL (40 mL Intravenous Given 2/6/18 1358)   potassium chloride (K-DUR,KLOR-CON) CR tablet 20 mEq (20 mEq Oral Given 2/9/18 1155)   iohexol (OMNIPAQUE) 300 mg/mL injection 50 mL (10 mL Intravenous Given 2/9/18 1619)   iohexol (OMNIPAQUE) 350 MG/ML injection (SINGLE-DOSE) 80 mL (80 mL Intravenous Given 2/10/18 2236)   sodium chloride 0 9 % bolus 500 mL (500 mL Intravenous New Bag 2/10/18 2308)   LORazepam (ATIVAN) 2 mg/mL injection 0 5 mg (0 5 mg Intravenous Given 2/11/18 1540)   HYDROmorphone (DILAUDID) injection 0 5 mg (0 5 mg Intravenous Given 2/11/18 1545)   sodium chloride 0 9 % bolus 1,000 mL (0 mL Intravenous Stopped 2/11/18 1745)   iodixanol (VISIPAQUE) 320 MG/ML injection 80 mL (80 mL Intravenous Given 2/11/18 1640)   sodium chloride 0 9 % bolus 1,000 mL (1,000 mL Intravenous New Bag 2/11/18 1824)   HYDROmorphone (DILAUDID) injection 0 5 mg (0 5 mg Intravenous Given 2/13/18 1335)       Diagnostic Studies  Results Reviewed     Procedure Component Value Units Date/Time    Cancer antigen 19-9 [[id number]]  (Abnormal) Collected:  01/31/18 2020    Lab Status:  Final result Specimen:  Blood from Arm, Left Updated:  02/02/18 0817     CA 19-9 7317 (H) U/mL     Narrative:       Performed at:  [address] [place] [address]  [id number]  : Rosa Mc MD, Phone:  [phone]    CEA [[id number]]  (Abnormal) Collected:  01/31/18 2020    Lab Status:  Final result Specimen:  Blood from Arm, Left Updated:  02/01/18 0755     CEA 4 1 (H) ng/mL     CBC and differential [[id number]]  (Abnormal) Collected:  01/31/18 2020    Lab Status:  Final result Specimen:  Blood from Arm, Left Updated:  02/01/18 0338     WBC 17 11 (H) Thousand/uL      RBC 3 42 (L) Million/uL      Hemoglobin 10 5 (L) g/dL      Hematocrit 29 0 (L) %      MCV 85 fL      MCH 30 7 pg      MCHC 36 2 g/dL      RDW 18 5 (H) %      MPV 10 5 fL      Platelets 721 Thousands/uL      nRBC 0 /100 WBCs      Neutrophils Relative 80 (H) %      Lymphocytes Relative 11 (L) %      Monocytes Relative 8 %      Eosinophils Relative 0 %      Basophils Relative 1 %      Neutrophils Absolute 13 47 (H) Thousands/µL      Lymphocytes Absolute 1 92 Thousands/µL      Monocytes Absolute 1 44 (H) Thousand/µL      Eosinophils Absolute 0 04 Thousand/µL      Basophils Absolute 0 09 Thousands/µL     Narrative: This is an appended report  These results have been appended to a previously verified report  Urine Microscopic [[id number]]  (Abnormal) Collected:  01/31/18 2145    Lab Status:  Final result Specimen:  Urine from Urine, Clean Catch Updated:  01/31/18 2231     RBC, UA None Seen /hpf      WBC, UA 2-4 (A) /hpf      Epithelial Cells Occasional /hpf      Bacteria, UA None Seen /hpf      Hyaline Casts, UA 0-3 (A) /lpf     UA  w Reflex to Microscopic [[id number]]  (Abnormal) Collected:  01/31/18 2145    Lab Status:  Final result Specimen:  Urine from Urine, Clean Catch Updated:  01/31/18 2213     Color, UA Orange     Clarity, UA Cloudy     Specific Gravity, UA 1 028     pH, UA 5 5     Leukocytes, UA Small (A)     Nitrite, UA Positive (A)     Protein, UA Trace (A) mg/dl      Glucose, UA Negative mg/dl      Ketones, UA Trace (A) mg/dl      Urobilinogen, UA 1 0 E U /dl      Bilirubin, UA Interference- unable to analyze (A)     Blood, UA Negative    Onyx draw [[id number]] Collected:  01/31/18 2020    Lab Status: In process Specimen:  Blood Updated:  01/31/18 2201    Narrative: The following orders were created for panel order Onyx draw    Procedure                               Abnormality         Status ---------                               -----------         ------                     Kylie Lo Top on XMOD[[id number]]                            Final result               Gold top on IGMQ[[id number]]                                  In process                 Green / Yellow tube on IKIK[[id number]]                       Final result               Green / Black tube on FXBP[[id number]]                        Final result               Lavender Top on CIPN[[id number]]                              Final result                 Please view results for these tests on the individual orders  Bilirubin, direct [[id number]]  (Abnormal) Collected:  01/31/18 2020    Lab Status:  Final result Specimen:  Blood from Arm, Left Updated:  01/31/18 2056     Bilirubin, Direct 12 57 (H) mg/dL     Comprehensive metabolic panel [[id number]]  (Abnormal) Collected:  01/31/18 2020    Lab Status:  Final result Specimen:  Blood from Arm, Left Updated:  01/31/18 2056     Sodium 134 (L) mmol/L      Potassium 3 7 mmol/L      Chloride 100 mmol/L      CO2 25 mmol/L      Anion Gap 9 mmol/L      BUN 20 mg/dL      Creatinine 1 34 (H) mg/dL      Glucose 110 mg/dL      Calcium 8 9 mg/dL      AST 80 (H) U/L       (H) U/L      Alkaline Phosphatase 467 (H) U/L      Total Protein 7 5 g/dL      Albumin 2 1 (L) g/dL      Total Bilirubin 14 95 (H) mg/dL      eGFR 53 ml/min/1 73sq m     Narrative:         National Kidney Disease Education Program recommendations are as follows:  GFR calculation is accurate only with a steady state creatinine  Chronic Kidney disease less than 60 ml/min/1 73 sq  meters  Kidney failure less than 15 ml/min/1 73 sq  meters  CTA abdomen pelvis w wo contrast   Final Result by Darya Sahu MD (28/44 5520)   Addendum 1 of 1 by Darya Sahu MD (02/12 5816)   ADDENDUM:      Density of ascites fluid was mentioned in the body of the report       Relative hyperdensity above that of simple fluid suggests proteinaceous or    sanguinous component  Small pleural effusions and lower lobe atelectasis are unchanged  Final      1  Aortoiliac atheromatous disease with areas of plaque ulceration  No acute dissections or aneurysm  Ostial stenosis of the left renal artery  Ostial narrowings at the celiac and mesenteric arteries account for less than 50% of lumen diameter  2   Soft tissue masses at the distal pancreas, left adrenal gland, pelvis, and upper mesentery, as previously demonstrated  Possible organs of origin include pancreas and sigmoid colon  3   Recent left percutaneous nephrostomy  Degree of distention of the left renal collecting system has decreased  Delayed, heterogeneous enhancement of the left kidney suggests persistent parenchymal dysfunction  4   Percutaneous biliary drainage with associated pneumobilia  Drained portions of the left and anterior right intrahepatic biliary tree show postprocedural pneumobilia  Workstation performed: [id number]         XR abdomen 1 view kub   Final Result by Charletta Lefort, MD (87/74 9463)      1  No bowel obstruction  2   Small right pleural effusion  3   Percutaneous nephrostomy and biliary drains are unchanged  Workstation performed: [id number]         CT abdomen pelvis w contrast   Final Result by Derick Crowley MD (02/10 6711)      1  Redemonstration of large omental mass involving the otto hepatis, distal stomach, and pancreatic body  2   Interval increase in moderate ascites  No discrete loculated collection to suggest abscess  3   Stable small right pleural effusion  4   Interval placement of two percutaneous biliary drains with stable intrahepatic biliary duct dilatation and expected pneumobilia  5  Interval placement of left nephrostomy tube with resolution of left hydronephrosis  Stable metastatic deposit at the left ureteropelvic junction     6   Stable thickening of a long segment of sigmoid colon which likely reflects neoplastic change as previously described however in the setting of fever and infection diverticulitis is not fully excluded  Workstation performed: [id number]         IR tube check   Final Result by Lalita Salazar MD (02/09 2474)      Patent bilateral internal jugular biliary drainage catheters  Workstation performed: [id number]         CT chest wo contrast   Final Result by Tod Baires MD (02/07 1705)   1  Bibasilar atelectasis with small pleural effusions  4 mm nodule along the left major fissure likely a node  This may be reassessed with CT in 3 months  2   Angie hepatis and peripancreatic tumor mass better evaluated on prior CT  Workstation performed: [id number]         IR tube placement bili   Final Result by Lalita Salazar MD (02/07 3445)   Impression:       Bilateral biliary obstruction secondary to a central occlusion resulting in isolated right and left bile ducts  Bilateral 10-[place] internal/external biliary drainage catheter placement  Workstation performed: [id number]         IR tube placement nephrostomy   Final Result by Lalita Salazar MD (02/07 0830)   Impression:      Distal left ureteral occlusion  Successful placement of a 8 5 [place]i percutaneous nephrostomy catheter  Workstation performed: [id number]         CT needle biopsy retroperitoneum   Final Result by Curry Bradley MD (02/05 1114)   Impression: Successful percutaneous core biopsy of a perihepatic lesion as described  A full pathology report will follow                    Workstation performed: [id number]               Procedures  Procedures  Conscious Sedation Assessment    Flowsheet Row Classification Score   ASA Scale Assessment  2-Mild to moderate systemic disease, medically well controlled, with no functional limitation filed at 02/02/2018 0935   Mallampati Classification  Class II: soft palate, uvula, fauces visible - No Difficulty filed at 02/02/2018 0935          Phone Consults  ED Phone Contact    ED Course  ED Course            Identification of Seniors at [facility] Most Recent Value   (ISAR) Identification of Seniors at Risk   Before the illness or injury that brought you to the Emergency, did you need someone to help you on a regular basis? 0 Filed at: 01/31/2018 1833   In the last 24 hours, have you needed more help than usual?  0 Filed at: 01/31/2018 3298   Have you been hospitalized for one or more nights during the past 6 months? 0 Filed at: 01/31/2018 1833   In general, do you see well?  0 Filed at: 01/31/2018 1833   In general, do you have serious problems with your memory? 0 Filed at: 01/31/2018 1833   Do you take more than three different medications every day? 1 Filed at: 01/31/2018 1833   ISAR Score  1 Filed at: 01/31/2018 1833                          MDM  Number of Diagnoses or Management Options  Abnormal CT of the abdomen:   Jaundice:   Diagnosis management comments: Patient has CT scan showing intrabdominal tumor burden  He has painless jaundice and hyperbilirubinemia  Plan: CBC, CMP, total bilirubin, IVF  Admit to [facility] for further evaluation by oncology       CritCare Time    Disposition  Final diagnoses:   Jaundice   Abnormal CT of the abdomen     Time reflects when diagnosis was documented in both MDM as applicable and the Disposition within this note     Time User Action Codes Description Comment    1/31/2018  9:40 PM Davian Pastrana Add [R17] Jaundice     1/31/2018  9:40 PM Brandie Europe [R93 5] Abnormal CT of the abdomen     1/31/2018 11:08 PM Jimmy Furnace Add [R79 89] Abnormal LFTs     1/31/2018 11:08 PM Jimmy Furnace Add [R63 4] Weight loss, non-intentional     2/1/2018 10:21 AM Russell Speed Add [N13 30] Hydronephrosis, unspecified hydronephrosis type     2/7/2018  2:35 PM Marcelino Ramos Add [R16 0] Liver mass     2/10/2018  7:50 PM Jovanna Hdez Add [C79 9] Metastatic adenocarcinoma ([facility] [address] )     2/10/2018  7:50 PM Sonia Hdez Add [R10 84] Generalized abdominal pain     2/11/2018  5:07 PM Gutierrez Comfort Add [E87 2] Lactic acidosis       ED Disposition     ED Disposition Condition Comment    Admit  Case was discussed with KUN and the patient's admission status was agreed to be Admission Status: inpatient status to the service of Dr Angel Sandhu MD Documentation    [address]      RN Documentation    [address]      Follow-up Information     Follow up With Specialties Details Why Contact Info Additional [address] [facility]  Follow up Fax DCI to [phone]      Irma Cullen MD Urology Schedule an appointment as soon as possible for a visit in 10 week(s) Urology--regarding hydronephrosis  Our office will contact you and arrange new appointment date and time  [address] [facility] 62 Interventional Radiology Radiology Schedule an appointment as soon as possible for a visit in 12 week(s) Routine visit for For percutaneous nephrostomy exchange   [address]  [phone] BE IR, [address], [address] [place] , DO Gastroenterology Schedule an appointment as soon as possible for a visit in 2 week(s)  [facility]  [facility]  [facility]       Paul Jason DO Family Medicine Schedule an appointment as soon as possible for a visit in 1 week(s)  [place] [address] [place] [address]       Fortino Litten, MD Oncology, Hematology and Oncology Follow up on Monday [phone] [place] [address]  [phone]           Discharge Medication List as of 2/14/2018  2:51 PM      START taking these medications    Details   docusate sodium (COLACE) 100 mg capsule Take 1 capsule (100 mg total) by mouth 2 (two) times a day, Starting Wed 2/14/2018, No Print      haloperidol (HALDOL) 2 mg/mL solution Take 0 5 mL (1 mg total) by mouth 3 (three) times a day, Starting Wed 2/14/2018, No Print      !! haloperidol lactate (HALDOL) 5 mg/mL Infuse 0 2 mL (1 mg total) into a venous catheter every hour as needed for agitation (N/V), Starting Wed 2/14/2018, No Print      !! haloperidol lactate (HALDOL) 5 mg/mL Infuse 0 2 mL (1 mg total) into a venous catheter 3 (three) times a day, Starting Wed 2/14/2018, No Print      HYDROmorphone (DILAUDID) 1 mg/mL Infuse 0 5 mL (0 5 mg total) into a venous catheter every 2 (two) hours as needed for severe pain for up to 10 days Max Daily Amount: 6 mg, Starting Wed 2/14/2018, Until Sat 2/24/2018, No Print      LORazepam (ATIVAN) 2 mg/mL Infuse 0 25 mL (0 5 mg total) into a venous catheter every 8 (eight) hours as needed for anxiety for up to 10 days, Starting Wed 2/14/2018, Until Sat 2/24/2018, No Print      magnesium hydroxide (MILK OF MAGNESIA) 400 mg/5 mL oral suspension Take 30 mL by mouth daily as needed for constipation, Starting Wed 2/14/2018, No Print      morphine 2 mg/mL injection Infuse 1 mL (2 mg total) into a venous catheter every 4 (four) hours as needed for severe pain for up to 10 days Max Daily Amount: 12 mg, Starting Wed 2/14/2018, Until Sat 2/24/2018, No Print      !! oxyCODONE (ROXICODONE) 5 mg/5 mL solution Take 10 mL (10 mg total) by mouth every 6 (six) hours for 10 days Max Daily Amount: 40 mg, Starting Wed 2/14/2018, Until Sat 2/24/2018, No Print      !! oxyCODONE (ROXICODONE) 5 mg/5 mL solution Take 10 mL (10 mg total) by mouth every 4 (four) hours as needed for moderate pain for up to 10 days Max Daily Amount: 60 mg, Starting Wed 2/14/2018, Until Sat 2/24/2018, No Print      senna (SENOKOT) 8 6 mg Take 1 tablet (8 6 mg total) by mouth daily at bedtime, Starting Wed 2/14/2018, No Print       !! - Potential duplicate medications found  Please discuss with provider  CONTINUE these medications which have NOT CHANGED    Details   albuterol (VENTOLIN HFA) 90 mcg/act inhaler Inhale 2 puffs every 4 (four) hours as needed for wheezing, Starting Wed 1/24/2018, Normal         STOP taking these medications       Multiple Vitamins-Minerals (MULTI COMPLETE PO) Comments:   Reason for Stopping:         naproxen (NAPROSYN) 500 mg tablet Comments:   Reason for Stopping:               Outpatient Discharge Orders  Ambulatory Referral to Home Health   Standing Status: Future  Standing Exp  Date: 08/14/18         ED Provider  Attending physically available and evaluated Brown Merlin I managed the patient along with the ED Attending      Electronically Signed by         Mee Elizalde DO  02/23/18 0123

## 2018-04-11 NOTE — PROGRESS NOTES
Progress Note - Phylicia Khan 71 y o  male MRN: [id number]    Unit/Bed#: ICU 10 Encounter: [id number]      ASSESSMENT/ PLAN:   70 yo male pmh COPD being evaluated for painless jaundice and elevated LFTs     1  Metastatic adenocarcinoma:  CT revealed 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 site with involvement of upper abdominal organs including the tail of the pancreas, omentum, peritoneal margin, left para-aortic nodes and long segment of sigmoid colon  CEA 4 1,  elevated to 7317  Biopsy near liver revealed adenocarcinoma likely of with pancreatic origin  He is s/p PTC drain  unfortunately tbili remains elevated around 15    -Patient has now been made comfort care after discussion with family and ICU staff, which is appropriate given worsening clinical picture despite maximum medical management      2  Painless Jaundice with elevated LFTs: secondary to #1 and tumor involvement of the otto hepatis resulting in high-grade intrahepatic biliary obstruction as well as encasement of the hepatic artery and occlusion of the main portal vein  Tbili continues to be elevated at 15   -agree with family decision for comfort care    3  Bleeding around PTC drain: bleeding around PTC drain with decrease in Hgb to 7 6 leading to rapid response and admission to the ICU  He is now stable 8 9 after 3 units of PRBCs  Per nursing he is having bowel movements without evidence of melena or hematochezia  -monitor H&H  -correct coagulopathy   -no further bleeding  -no evidence of GI bleeding, no plan for intervention     Subjective:     Patient seen and examined    Objective:     Vitals: Blood pressure 140/70, pulse 74, temperature 98 2 °F (36 8 °C), resp  rate (!) 10, height 5' 8" (1 727 m), weight 56 8 kg (125 lb 3 5 oz), SpO2 98 %  ,Body mass index is 19 04 kg/m²        Intake/Output Summary (Last 24 hours) at 02/12/18 1320  Last data filed at 02/12/18 1000   Gross per 24 hour   Intake             4123 ml   Output 870 ml   Net             3253 ml       Physical Exam:     General Appearance: alert, appears stated age and cooperative, jaundiced, scleral icterus  Lungs: Clear to auscultation bilaterally, no rales or rhonchi  Heart: Regular rate and rhythm, S1, S2 normal, no murmur, click, rub or gallop  Abdomen: Soft, diffuse tenderness, non-distended; bowel sounds normal; no masses or no organomegaly, nephrostomy and biliary drains noted  Extremities: No cyanosis, clubbing, edema    Invasive Devices     Peripheral Intravenous Line            Peripheral IV 02/10/18 Right Wrist 2 days    Peripheral IV 02/10/18 Left Antecubital 1 day    Peripheral IV 02/11/18 Right Antecubital less than 1 day          Drain            Closed/Suction Drain Right Abdomen  10 2 Fr  6 days    Nephrostomy Left 1 8 5 Fr  6 days    Closed/Suction Drain Left Abdomen Other (Comment) 10 2 Fr  5 days    External Urinary Catheter Large less than 1 day                Lab Results:      Results from last 7 days  Lab Units 02/12/18  0453   WBC Thousand/uL 13 53*   HEMOGLOBIN g/dL 8 9*   HEMATOCRIT % 25 1*   PLATELETS Thousands/uL 168   NEUTROS PCT % 85*   LYMPHS PCT % 8*   MONOS PCT % 7   EOS PCT % 0       Results from last 7 days  Lab Units 02/12/18  0453   SODIUM mmol/L 143   POTASSIUM mmol/L 3 3*   CHLORIDE mmol/L 112*   CO2 mmol/L 22   BUN mg/dL 38*   CREATININE mg/dL 1 71*   CALCIUM mg/dL 7 5*   TOTAL PROTEIN g/dL 5 5*   BILIRUBIN TOTAL mg/dL 14 66*   ALK PHOS U/L 194*   ALT U/L 55   AST U/L 64*   GLUCOSE RANDOM mg/dL 86       Results from last 7 days  Lab Units 02/12/18  0453   INR  2 21*           Imaging Studies: I have personally reviewed pertinent imaging studies  Ct Needle Biopsy Retroperitoneum    Result Date: 2/5/2018  Impression: Impression: Successful percutaneous core biopsy of a perihepatic lesion as described  A full pathology report will follow  .